# Patient Record
Sex: MALE | Race: WHITE | NOT HISPANIC OR LATINO | URBAN - METROPOLITAN AREA
[De-identification: names, ages, dates, MRNs, and addresses within clinical notes are randomized per-mention and may not be internally consistent; named-entity substitution may affect disease eponyms.]

---

## 2018-01-19 ENCOUNTER — INPATIENT (INPATIENT)
Facility: HOSPITAL | Age: 53
LOS: 3 days | Discharge: PSYCHIATRIC OR SUBSTANCE USE FACILITY | DRG: 557 | End: 2018-01-23
Attending: INTERNAL MEDICINE | Admitting: INTERNAL MEDICINE
Payer: MEDICAID

## 2018-01-19 VITALS
RESPIRATION RATE: 16 BRPM | HEART RATE: 104 BPM | TEMPERATURE: 98 F | DIASTOLIC BLOOD PRESSURE: 71 MMHG | OXYGEN SATURATION: 98 % | SYSTOLIC BLOOD PRESSURE: 114 MMHG

## 2018-01-19 DIAGNOSIS — N17.9 ACUTE KIDNEY FAILURE, UNSPECIFIED: ICD-10-CM

## 2018-01-19 DIAGNOSIS — R63.8 OTHER SYMPTOMS AND SIGNS CONCERNING FOOD AND FLUID INTAKE: ICD-10-CM

## 2018-01-19 DIAGNOSIS — G92 TOXIC ENCEPHALOPATHY: ICD-10-CM

## 2018-01-19 DIAGNOSIS — F25.0 SCHIZOAFFECTIVE DISORDER, BIPOLAR TYPE: ICD-10-CM

## 2018-01-19 DIAGNOSIS — Z29.9 ENCOUNTER FOR PROPHYLACTIC MEASURES, UNSPECIFIED: ICD-10-CM

## 2018-01-19 DIAGNOSIS — M62.82 RHABDOMYOLYSIS: ICD-10-CM

## 2018-01-19 DIAGNOSIS — R74.8 ABNORMAL LEVELS OF OTHER SERUM ENZYMES: ICD-10-CM

## 2018-01-19 LAB
ALBUMIN SERPL ELPH-MCNC: 4.7 G/DL — SIGNIFICANT CHANGE UP (ref 3.3–5)
ALP SERPL-CCNC: 114 U/L — SIGNIFICANT CHANGE UP (ref 40–120)
ALT FLD-CCNC: 91 U/L — HIGH (ref 10–45)
AMPHET UR-MCNC: NEGATIVE — SIGNIFICANT CHANGE UP
ANION GAP SERPL CALC-SCNC: 27 MMOL/L — HIGH (ref 5–17)
APAP SERPL-MCNC: <15 UG/ML — SIGNIFICANT CHANGE UP (ref 10–30)
APPEARANCE UR: CLEAR — SIGNIFICANT CHANGE UP
APTT BLD: 27.9 SEC — SIGNIFICANT CHANGE UP (ref 27.5–37.4)
AST SERPL-CCNC: 309 U/L — HIGH (ref 10–40)
BACTERIA # UR AUTO: (no result) /HPF
BARBITURATES UR SCN-MCNC: NEGATIVE — SIGNIFICANT CHANGE UP
BASE EXCESS BLDV CALC-SCNC: -9.1 MMOL/L — SIGNIFICANT CHANGE UP
BASOPHILS NFR BLD AUTO: 0 % — SIGNIFICANT CHANGE UP (ref 0–2)
BENZODIAZ UR-MCNC: NEGATIVE — SIGNIFICANT CHANGE UP
BILIRUB SERPL-MCNC: 1.2 MG/DL — SIGNIFICANT CHANGE UP (ref 0.2–1.2)
BILIRUB UR-MCNC: (no result)
BUN SERPL-MCNC: 72 MG/DL — HIGH (ref 7–23)
CALCIUM SERPL-MCNC: 9.6 MG/DL — SIGNIFICANT CHANGE UP (ref 8.4–10.5)
CHLORIDE SERPL-SCNC: 104 MMOL/L — SIGNIFICANT CHANGE UP (ref 96–108)
CO2 SERPL-SCNC: 16 MMOL/L — LOW (ref 22–31)
COCAINE METAB.OTHER UR-MCNC: NEGATIVE — SIGNIFICANT CHANGE UP
COLOR SPEC: YELLOW — SIGNIFICANT CHANGE UP
CREAT SERPL-MCNC: 3.18 MG/DL — HIGH (ref 0.5–1.3)
DIFF PNL FLD: (no result)
EOSINOPHIL NFR BLD AUTO: 0 % — SIGNIFICANT CHANGE UP (ref 0–6)
EPI CELLS # UR: SIGNIFICANT CHANGE UP /HPF (ref 0–5)
ETHANOL SERPL-MCNC: <10 MG/DL — SIGNIFICANT CHANGE UP (ref 0–10)
GAS PNL BLDV: SIGNIFICANT CHANGE UP
GLUCOSE SERPL-MCNC: 115 MG/DL — HIGH (ref 70–99)
GLUCOSE UR QL: NEGATIVE — SIGNIFICANT CHANGE UP
GRAN CASTS # UR COMP ASSIST: (no result) /LPF
HCO3 BLDV-SCNC: 15 MMOL/L — LOW (ref 20–27)
HCT VFR BLD CALC: 41 % — SIGNIFICANT CHANGE UP (ref 39–50)
HGB BLD-MCNC: 14.1 G/DL — SIGNIFICANT CHANGE UP (ref 13–17)
HYALINE CASTS # UR AUTO: (no result) /LPF (ref 0–2)
HYPERCHROMIA BLD QL AUTO: SLIGHT — SIGNIFICANT CHANGE UP
INR BLD: 1.26 — HIGH (ref 0.88–1.16)
KETONES UR-MCNC: (no result) MG/DL
LACTATE SERPL-SCNC: 1.9 MMOL/L — SIGNIFICANT CHANGE UP (ref 0.5–2)
LACTATE SERPL-SCNC: 4.5 MMOL/L — CRITICAL HIGH (ref 0.5–2)
LEUKOCYTE ESTERASE UR-ACNC: NEGATIVE — SIGNIFICANT CHANGE UP
LITHIUM SERPL-MCNC: 0.86 MMOL/L — SIGNIFICANT CHANGE UP (ref 0.6–1.2)
LYMPHOCYTES # BLD AUTO: 2 % — LOW (ref 13–44)
MAGNESIUM SERPL-MCNC: 3.5 MG/DL — HIGH (ref 1.6–2.6)
MANUAL DIF COMMENT BLD-IMP: SIGNIFICANT CHANGE UP
MANUAL SMEAR VERIFICATION: SIGNIFICANT CHANGE UP
MCHC RBC-ENTMCNC: 30.3 PG — SIGNIFICANT CHANGE UP (ref 27–34)
MCHC RBC-ENTMCNC: 34.4 G/DL — SIGNIFICANT CHANGE UP (ref 32–36)
MCV RBC AUTO: 88 FL — SIGNIFICANT CHANGE UP (ref 80–100)
METHADONE UR-MCNC: NEGATIVE — SIGNIFICANT CHANGE UP
MONOCYTES NFR BLD AUTO: 2 % — SIGNIFICANT CHANGE UP (ref 2–14)
NEUTROPHILS NFR BLD AUTO: 94 % — HIGH (ref 43–77)
NEUTS BAND # BLD: 2 % — SIGNIFICANT CHANGE UP
NITRITE UR-MCNC: NEGATIVE — SIGNIFICANT CHANGE UP
OPIATES UR-MCNC: NEGATIVE — SIGNIFICANT CHANGE UP
OVALOCYTES BLD QL SMEAR: SLIGHT — SIGNIFICANT CHANGE UP
PCO2 BLDV: 28 MMHG — LOW (ref 41–51)
PCP SPEC-MCNC: SIGNIFICANT CHANGE UP
PCP UR-MCNC: NEGATIVE — SIGNIFICANT CHANGE UP
PH BLDV: 7.34 — SIGNIFICANT CHANGE UP (ref 7.32–7.43)
PH UR: 6 — SIGNIFICANT CHANGE UP (ref 5–8)
PLAT MORPH BLD: NORMAL — SIGNIFICANT CHANGE UP
PLATELET # BLD AUTO: 326 K/UL — SIGNIFICANT CHANGE UP (ref 150–400)
PO2 BLDV: 100 MMHG — SIGNIFICANT CHANGE UP
POTASSIUM SERPL-MCNC: 3.8 MMOL/L — SIGNIFICANT CHANGE UP (ref 3.5–5.3)
POTASSIUM SERPL-SCNC: 3.8 MMOL/L — SIGNIFICANT CHANGE UP (ref 3.5–5.3)
PROT SERPL-MCNC: 8.5 G/DL — HIGH (ref 6–8.3)
PROT UR-MCNC: 100 MG/DL
PROTHROM AB SERPL-ACNC: 14.1 SEC — HIGH (ref 9.8–12.7)
RAPID RVP RESULT: SIGNIFICANT CHANGE UP
RBC # BLD: 4.66 M/UL — SIGNIFICANT CHANGE UP (ref 4.2–5.8)
RBC # FLD: 14.7 % — SIGNIFICANT CHANGE UP (ref 10.3–16.9)
RBC BLD AUTO: (no result)
RBC CASTS # UR COMP ASSIST: < 5 /HPF — SIGNIFICANT CHANGE UP
SALICYLATES SERPL-MCNC: <0.3 MG/DL — LOW (ref 2.8–20)
SAO2 % BLDV: 97 % — SIGNIFICANT CHANGE UP
SODIUM SERPL-SCNC: 147 MMOL/L — HIGH (ref 135–145)
SP GR SPEC: 1.02 — SIGNIFICANT CHANGE UP (ref 1–1.03)
SPHEROCYTES BLD QL SMEAR: SLIGHT — SIGNIFICANT CHANGE UP
THC UR QL: NEGATIVE — SIGNIFICANT CHANGE UP
TROPONIN T SERPL-MCNC: 0.02 NG/ML — HIGH (ref 0–0.01)
UROBILINOGEN FLD QL: 0.2 E.U./DL — SIGNIFICANT CHANGE UP
WBC # BLD: 34.6 K/UL — HIGH (ref 3.8–10.5)
WBC # FLD AUTO: 34.6 K/UL — HIGH (ref 3.8–10.5)
WBC UR QL: < 5 /HPF — SIGNIFICANT CHANGE UP

## 2018-01-19 PROCEDURE — 99291 CRITICAL CARE FIRST HOUR: CPT | Mod: 25

## 2018-01-19 PROCEDURE — 71045 X-RAY EXAM CHEST 1 VIEW: CPT | Mod: 26

## 2018-01-19 PROCEDURE — 93010 ELECTROCARDIOGRAM REPORT: CPT

## 2018-01-19 PROCEDURE — 90792 PSYCH DIAG EVAL W/MED SRVCS: CPT

## 2018-01-19 PROCEDURE — 70450 CT HEAD/BRAIN W/O DYE: CPT | Mod: 26

## 2018-01-19 RX ORDER — FLUPHENAZINE HYDROCHLORIDE 1 MG/1
5 TABLET, FILM COATED ORAL EVERY 6 HOURS
Qty: 0 | Refills: 0 | Status: DISCONTINUED | OUTPATIENT
Start: 2018-01-19 | End: 2018-01-20

## 2018-01-19 RX ORDER — SODIUM CHLORIDE 9 MG/ML
1000 INJECTION INTRAMUSCULAR; INTRAVENOUS; SUBCUTANEOUS
Qty: 0 | Refills: 0 | Status: DISCONTINUED | OUTPATIENT
Start: 2018-01-19 | End: 2018-01-20

## 2018-01-19 RX ORDER — SODIUM CHLORIDE 9 MG/ML
1000 INJECTION INTRAMUSCULAR; INTRAVENOUS; SUBCUTANEOUS ONCE
Qty: 0 | Refills: 0 | Status: COMPLETED | OUTPATIENT
Start: 2018-01-19 | End: 2018-01-19

## 2018-01-19 RX ORDER — PIPERACILLIN AND TAZOBACTAM 4; .5 G/20ML; G/20ML
3.38 INJECTION, POWDER, LYOPHILIZED, FOR SOLUTION INTRAVENOUS ONCE
Qty: 0 | Refills: 0 | Status: DISCONTINUED | OUTPATIENT
Start: 2018-01-19 | End: 2018-01-19

## 2018-01-19 RX ORDER — HEPARIN SODIUM 5000 [USP'U]/ML
5000 INJECTION INTRAVENOUS; SUBCUTANEOUS EVERY 8 HOURS
Qty: 0 | Refills: 0 | Status: DISCONTINUED | OUTPATIENT
Start: 2018-01-19 | End: 2018-01-23

## 2018-01-19 RX ORDER — PIPERACILLIN AND TAZOBACTAM 4; .5 G/20ML; G/20ML
2.25 INJECTION, POWDER, LYOPHILIZED, FOR SOLUTION INTRAVENOUS ONCE
Qty: 0 | Refills: 0 | Status: COMPLETED | OUTPATIENT
Start: 2018-01-19 | End: 2018-01-19

## 2018-01-19 RX ORDER — PIPERACILLIN AND TAZOBACTAM 4; .5 G/20ML; G/20ML
INJECTION, POWDER, LYOPHILIZED, FOR SOLUTION INTRAVENOUS
Qty: 0 | Refills: 0 | Status: DISCONTINUED | OUTPATIENT
Start: 2018-01-19 | End: 2018-01-19

## 2018-01-19 RX ADMIN — HEPARIN SODIUM 5000 UNIT(S): 5000 INJECTION INTRAVENOUS; SUBCUTANEOUS at 21:57

## 2018-01-19 RX ADMIN — SODIUM CHLORIDE 200 MILLILITER(S): 9 INJECTION INTRAMUSCULAR; INTRAVENOUS; SUBCUTANEOUS at 21:57

## 2018-01-19 RX ADMIN — Medication 1 MILLIGRAM(S): at 14:57

## 2018-01-19 RX ADMIN — SODIUM CHLORIDE 1000 MILLILITER(S): 9 INJECTION INTRAMUSCULAR; INTRAVENOUS; SUBCUTANEOUS at 14:18

## 2018-01-19 RX ADMIN — SODIUM CHLORIDE 1000 MILLILITER(S): 9 INJECTION INTRAMUSCULAR; INTRAVENOUS; SUBCUTANEOUS at 17:15

## 2018-01-19 RX ADMIN — PIPERACILLIN AND TAZOBACTAM 200 GRAM(S): 4; .5 INJECTION, POWDER, LYOPHILIZED, FOR SOLUTION INTRAVENOUS at 15:52

## 2018-01-19 NOTE — ED BEHAVIORAL HEALTH ASSESSMENT NOTE - RISK ASSESSMENT
Pt with no evidence of suicidal or homicidal ideation, intent or plan.  However, due to AMS and paranoia, is at risk for elopement. Will order C.O. for safety.

## 2018-01-19 NOTE — ED PROVIDER NOTE - CARE PLAN
Principal Discharge DX:	Altered mental status, unspecified altered mental status type  Secondary Diagnosis:	Acute renal failure, unspecified acute renal failure type  Secondary Diagnosis:	Non-traumatic rhabdomyolysis

## 2018-01-19 NOTE — ED PROVIDER NOTE - PROGRESS NOTE DETAILS
Spoke w/ ICU- consult requested. Pt placed on 1:1 obs for elopement risk. Pt getting agitated, stating he is scared that someone is going to kill him. Ativan given and pt more calm. Will obtain head ct now. Psych made aware. Pt seen by icu and psych. Will admit to [facility] for further management.

## 2018-01-19 NOTE — ED BEHAVIORAL HEALTH ASSESSMENT NOTE - OTHER
Outpt psychiatrist EMS Death of father In bed "Nausous" Confused Impoverished UTO Recent death of father

## 2018-01-19 NOTE — H&P ADULT - PROBLEM SELECTOR PLAN 2
Likely pre-renal given dehydrated state of patient on admission vs intrinsic 2/2 rhabdo.   - Continue IV hydration.  - Trend creatinine.   - Urine lytes ordered, f/u.

## 2018-01-19 NOTE — ED PROVIDER NOTE - DIAGNOSTIC INTERPRETATION
ER Physician: June Ree  CHEST XRAY INTERPRETATION: poor insp effort, lungs otherwise clear, heart shadow normal, bony structures intact

## 2018-01-19 NOTE — ED PROVIDER NOTE - PHYSICAL EXAMINATION
VITAL SIGNS: I have reviewed nursing notes and confirm.  CONSTITUTIONAL: Well-developed; well-nourished; in no acute distress.   SKIN:  warm and dry, no acute rash.   HEAD:  normocephalic, atraumatic.  EYES: PERRL, EOM intact; conjunctiva and sclera clear.  ENT: No nasal discharge; airway clear.   NECK: Supple; non tender.  CARD: S1, S2 normal; no murmurs, gallops, or rubs. Mildly tachy rate and reg rhythm.   RESP:  Clear to auscultation b/l, no wheezes, rales or rhonchi.  ABD: Normal bowel sounds; soft; non-distended; non-tender; no guarding/ rebound.  EXT: Normal ROM. No clubbing, cyanosis or edema. 2+ pulses to b/l ue/le.  NEURO: Alert, oriented to name, able to follow few simple commands, no obvious facial droop, motor/ sensation intact and equal b/l.   PSYCH: Unable to assess.

## 2018-01-19 NOTE — H&P ADULT - NSHPLABSRESULTS_GEN_ALL_CORE
LABS:                         14.1   34.6  )-----------( 326      ( 2018 14:01 )             41.0         147<H>  |  104  |  72<H>  ----------------------------<  115<H>  3.8   |  16<L>  |  3.18<H>    Ca    9.6      2018 14:01  Mg     3.5         TPro  8.5<H>  /  Alb  4.7  /  TBili  1.2  /  DBili  x   /  AST  309<H>  /  ALT  91<H>  /  AlkPhos  114      PT/INR - ( 2018 14:01 )   PT: 14.1 sec;   INR: 1.26       PTT - ( 2018 14:01 )  PTT:27.9 sec  Urinalysis Basic - ( 2018 16:06 )    Color: Yellow / Appearance: Clear / S.020 / pH: x  Gluc: x / Ketone: Trace mg/dL  / Bili: Small / Urobili: 0.2 E.U./dL   Blood: x / Protein: 100 mg/dL / Nitrite: NEGATIVE   Leuk Esterase: NEGATIVE / RBC: < 5 /HPF / WBC < 5 /HPF   Sq Epi: x / Non Sq Epi: 0-5 /HPF / Bacteria: Many /HPF    CARDIAC MARKERS ( 2018 14:01 )  x     / 0.02 ng/mL / 27922 U/L / x     / x        Lactate, Blood: 1.9 mmoL/L ( @ 19:04)  Lactate, Blood: 4.5 mmoL/L ( @ 14:00)    RADIOLOGY, EKG & ADDITIONAL TESTS: Reviewed.

## 2018-01-19 NOTE — H&P ADULT - ASSESSMENT
Patient is a 53yo Danish speaking male with a PMHx of schizoaffective disorder (bipolar type), and past psychotic episodes with hospital admissions was BIBA after being found lying on the ground confused and incontinent of stool, admitted for toxic metabolic encephalopathy, FELISHA, and non traumatic rhabdomyolysis.

## 2018-01-19 NOTE — ED ADULT NURSE NOTE - OBJECTIVE STATEMENT
Patient presents to the ED with EMS, as per EMS patient was found on the street altered. Patient is Puerto Rican speaking. Awake, and alert. Upgraded to MD Christin.  Small laceration to right thumb. Patient noted to have defected on self. Patient cleaned and changed. As per MD Ree patient has a  psych history.

## 2018-01-19 NOTE — ED BEHAVIORAL HEALTH ASSESSMENT NOTE - DESCRIPTION
Pt observed in bed, s/p rx of lorazepam 1 mg x 1. Intermittently responsive to questions. None known Born in [place]. [place] in . Lives with mother. Father recently .

## 2018-01-19 NOTE — ED BEHAVIORAL HEALTH ASSESSMENT NOTE - HPI (INCLUDE ILLNESS QUALITY, SEVERITY, DURATION, TIMING, CONTEXT, MODIFYING FACTORS, ASSOCIATED SIGNS AND SYMPTOMS)
Briefly, pt is 52-year-old [place]-born male with long hx of schizoaffective disorder, bipolar type, at least 3 prior inpt psychiatric admits, reportedly compliant with medication regimen, found wandering on the street, brought in by EMS. Mother reportedly called police to report pt missing.  Pt lives at home, is reportedly very compliant with his rx regimen.   Pt's father  on . Mother reports pt "unravelled" after father's death. During memorial service on , pt was "manic and hysterical." Pt brought to [facility], was initially admitted to medicine with plan that he would then be admitted to inpt psychiatry. However, he was discharged.

## 2018-01-19 NOTE — ED BEHAVIORAL HEALTH ASSESSMENT NOTE - OTHER PAST PSYCHIATRIC HISTORY (INCLUDE DETAILS REGARDING ONSET, COURSE OF ILLNESS, INPATIENT/OUTPATIENT TREATMENT)
As above.  Pt with at least 3 prior inpt psychiatric admits:   2003: [place] pt stopped his medications, went AWOL, [place] and [place] to [place].  2005: Admitted to [facility] due to ave, psychosis, paranoia, auditory hallucinations. Thought he was being followed by [employer]  2015: [facility] x 10-12 days.    In outpt treatment at [facility] ([phone]); Dr. Quintero

## 2018-01-19 NOTE — H&P ADULT - PROBLEM SELECTOR PLAN 1
Unclear etiology. Differential includes but not limited to uremia vs dehydration. Patient with normal utox and alcohol level wnl as well. Unlikely infectious in etiology given no source and patient afebrile. Also low suspicious for meningitis since physical exam does not correlate. CT head not showing any acute lesions. Lactate now cleared.   - IV hydration.   - Trend BMP for resolution of uremia and closure of anion gap.   - If patient was to spike a temp, please consider aspiration pneumonia as a source given patient's mental status when found. Unclear etiology. Differential includes but not limited to uremia vs dehydration. Patient with normal utox and alcohol level wnl as well. Unlikely infectious in etiology given no source and patient afebrile. Leukocytosis likely 2/2 leukemoid reaction. Also low suspicious for meningitis since physical exam does not correlate. CT head not showing any acute lesions. Lactate now cleared.   - IV hydration.   - Trend BMP for resolution of uremia and closure of anion gap.   - If patient was to spike a temp, please consider aspiration pneumonia as a source given patient's mental status when found.

## 2018-01-19 NOTE — H&P ADULT - NSHPPHYSICALEXAM_GEN_ALL_CORE
VITAL SIGNS:  T(C): 36.8 (01-19-18 @ 20:59), Max: 37.2 (01-19-18 @ 14:18)  T(F): 98.2 (01-19-18 @ 20:59), Max: 99 (01-19-18 @ 14:18)  HR: 86 (01-19-18 @ 17:54) (86 - 104)  BP: 171/79 (01-19-18 @ 17:54) (114/71 - 171/79)  BP(mean): --  RR: 18 (01-19-18 @ 17:54) (16 - 18)  SpO2: 94% (01-19-18 @ 17:54) (93% - 98%)  Wt(kg): --    PHYSICAL EXAM:    Constitutional: WDWN resting comfortably in bed in NAD but slightly restless. Not fully cooperating with exam, following commands, or answering appropriately.   Head: NC/AT  Eyes: PERRL, EOMI, clear conjunctiva  ENT: mildly dry MM  Respiratory: CTA bilaterally  Cardiac: tachycardic, regular rhythm, no M/R/G appreciated  Gastrointestinal: normoactive bowel sounds, abdomen soft, NTND  Extremities: WWP, no clubbing or cyanosis. No peripheral edema  Musculoskeletal: NROM x4. No joint swelling, tenderness or erythema  Vascular: 2+ radial and DP pulses B/L  Neurologic: AAOx1 (self). CNII-XII grossly intact. No focal deficits  Psychiatric: affect and characteristics of appearance, verbalizations, behaviors are inappropriate. Patient seems like having visual hallucinations although not endorsing them.

## 2018-01-19 NOTE — ED BEHAVIORAL HEALTH ASSESSMENT NOTE - SUMMARY
52-year-old Citizen of [place]-born male with long hx of schizoaffective disorder, bipolar type, at least 3 prior inpt psychiatric admits, reportedly compliant with medication regimen, found wandering on the street, brought in by EMS. Mother reportedly called police to report pt missing.  Pt lives at home, is reportedly very compliant with his rx regimen.   Pt currently confused, paranoid. Currently had increased CPK and WBC, which may be contributing to AMS.  Likely trigger is recent death of father.    Pt requiring acute medical admission.    For now, will hold all psychotropic medications, give prn prolixin 5 mg and ativan 1 mg.   C.O. for safety.  Pt will be seen over the weekend by psychiatry M.D. on call.  If pt remains with psychosis once medically improved/stabilized, will likely need acute inpt psych admit.

## 2018-01-19 NOTE — CONSULT NOTE ADULT - ATTENDING COMMENTS
Patient seen and examined; Plans discussed: Very agitated; Requiring 1:1. CK noted and sodium noted; Change to hypotonic fluid and decrease rate:

## 2018-01-19 NOTE — H&P ADULT - PROBLEM SELECTOR PLAN 4
Currently takes Lithium, Venlafaxine, Klonopin, Effexor, Risperdal, Trazodone, and Fluphenazine. Holding home meds.  - Psych following while inpatient and recommended Prolixin and lorazepam prn.  - Constant observation.

## 2018-01-19 NOTE — H&P ADULT - HISTORY OF PRESENT ILLNESS
Patient is a 53yo Slovenian speaking male with a PMHx of schizoaffective disorder (bipolar type), and past psychotic episodes with hospital admissions was BIBA after being found lying on the ground confused and incontinent of stool. Patient is unable to provide info to medical history given state of lethargy and confusion, as a result history was obtained via chart review and exchange with ED provider and psychiatric consultant. It was reported that patient left home yesterday and was placed on missing person list by [facility]. Patient has been under increased stress lately as his father passed away about a month ago. Patient lives with mother and reportedly has been compliant with meds. Assisted by Citizen of [place] speaking pharmacist, patient complained of cough for the past 2 weeks, but then said it was for the past 5mins. Otherwise he stated having no further complains.     In the ED, vital signs were:  - BP: 114/71mmHg  - HR: 104  - Temp: 98  - RR: 16   - O2Sat: 98% on RA    Patient was administered Ativan 2mg x 1 due to agitation. ICU consulted in setting of toxic metabolic encephalopathy, as well as psych given patient's known psych history.

## 2018-01-19 NOTE — ED ADULT TRIAGE NOTE - OTHER COMPLAINTS
lee found on street unresponsive by fire and now altered, pt unsure why he fell and was on floor, pt defecated himself

## 2018-01-19 NOTE — ED PROVIDER NOTE - MEDICAL DECISION MAKING DETAILS
Impression: ams, with h/o schizoaffective d/o, psychosis, reported as missing by mother as of yesterday. No signs of head trauma. Pt is a&o to name, knows that he is in hospital, cannot recall recent events. Afebrile. Mildly tachy and slightly hypoxic to low 90's on arrival, improved with supplemental o2. Labs reviewed w/ findings of leukocytosis to 34.6, lactic acidosis, hypernatremia, arf. pH 7.34 on vbg. UA neg for infection. Utox neg. Tylenol/ asa/ etoh levels wnl. Li level therapeutic. CPK elevated to 15k. Minimal trop at 0.02. EKG non-ischemic. Pt ordered for ivf boluses and zosyn for possible sepsis. ICU consult requested for evaluation.

## 2018-01-19 NOTE — CONSULT NOTE ADULT - SUBJECTIVE AND OBJECTIVE BOX
ICU CONSULT NOTE    HPI: 52y old Male Belarusian speaking with psychiatric history significant for schizoaffective disorder--Bipolar type, past psychotic episodes with hospital admissions was BIBA after being found lying on the ground on the corner of [address], confused and incontinent of stool. Patient is unable to provide info to medical history given state of lethargy and confusion, as a result history was obtained via chart review and exchange with ED provider and psychiatric consultant. It was reported that patient left home yesterday and was placed on missing person list by [facility]. Patient has been under increased stress lately as his father passed away about a month ago. Patient lives with mother and reportedly has been compliant with meds. Currently takes Lithium, Venlafaxine, Klonopin, Effexor, Risperdal, Trazodone, and Fluphenazine.  With the help of Belarusian , patient did complain of being thirsty and headache. Denies any blurry vision. No visual or auditory hallucinations.     In ED, vitals were remarkable for elevated BP. Patient was administered Ativan 2mg x 1 due to agitation ICU consulted in setting of toxic metabolic encephalopathy.      ROS: As per HPI     PAST MEDICAL & SURGICAL HISTORY  schizoaffective disorder--Bipolar type    Allergies    No Known Allergies    Intolerances    SOCIAL HX: unable to obtain given current medical condition    PHYSICAL EXAM   Vital Signs Last 24 Hrs  T(C): 36.8 (2018 17:54), Max: 37.2 (2018 14:18)  T(F): 98.3 (2018 17:54), Max: 99 (2018 14:18)  HR: 86 (2018 17:54) (86 - 104)  BP: 171/79 (2018 17:54) (114/71 - 171/79)  BP(mean): --  RR: 18 (2018 17:54) (16 - 18)  SpO2: 94% (2018 17:54) (93% - 98%)      General - Lethargic, lying in bed in NAD   HEENT - PERRLA  Mouth: Very dry mucous membrane   CV - Mild tachycardia, Normal S1, S2  Resp - Lungs CTA  Abdomen - Soft, NT, ND  Neuro" AAOx1, moving all extremities. Lethargic but easily arousable   Extremities - WWP  Skin - No rash       LABS                        14.1   34.6  )-----------( 326      ( 2018 14:01 )             41.0         147<H>  |  104  |  72<H>  ----------------------------<  115<H>  3.8   |  16<L>  |  3.18<H>    Ca    9.6      2018 14:01  Mg     3.5         TPro  8.5<H>  /  Alb  4.7  /  TBili  1.2  /  DBili  x   /  AST  309<H>  /  ALT  91<H>  /  AlkPhos  114      PT/INR - ( 2018 14:01 )   PT: 14.1 sec;   INR: 1.26       Creatine Kinase, Serum (18 @ 14:01)    Creatine Kinase, Serum: 40974: TYPE:(C=Critical, N=Notification, A=Abnormal) C       PTT - ( 2018 14:01 )  PTT:27.9 sec  Lactate, Blood: 4.5 mmoL/L (18 @ 14:00)    Urinalysis Basic - ( 2018 16:06 )    Color: Yellow / Appearance: Clear / S.020 / pH: x  Gluc: x / Ketone: Trace mg/dL  / Bili: Small / Urobili: 0.2 E.U./dL   Blood: x / Protein: 100 mg/dL / Nitrite: NEGATIVE   Leuk Esterase: NEGATIVE / RBC: < 5 /HPF / WBC < 5 /HPF   Sq Epi: x / Non Sq Epi: 0-5 /HPF / Bacteria: Many /HPF    Drug Screen: Negative     IMAGING     CT HEAD:    IMPRESSION:-    1.  No intracranial hemorrhage or infarct..    2.  An arachnoid cyst in the left anterior temporal fossa.       CXR - 18  Impression: No obvious infiltrates but cannot r/o retrocardiac infiltrates      EKG - 18 Sinus Tachycardia. Signs of LVH, no ST elevation

## 2018-01-19 NOTE — ED PROVIDER NOTE - OBJECTIVE STATEMENT
Pt is a 51yo m, h/o schizoaffective d/o, bipolar type, psychotic episodes and hypermania with last episode being > 10 yrs ago, ybiba for ams. Pt was found lying on ground on corner of [address], confused and incontinent of stool. + abrasion noted to r index finger. Pt is Zimbabwean speaking. As per pt's psych, Dr. Quintero, pt got agitated and left home yesterday afternoon, [employer] contacted and pt placed on missing persons list. H/o similar episode many yrs ago when has psychotic episode. Father recently passed and pt has been under a lot of stress. Pt is currently on klonazepam 1mg hs, effexor 225mg qd, lithium 600mg bid, resperdal 1mg qhs, trazodone 100mg qhs. No h/o etoh abuse, substance abuse. No si, hi. Pt is a 51yo m, h/o schizoaffective d/o, bipolar type, psychotic episodes and hypermania with last episode being > 10 yrs ago, ybiba for ams. Pt was found lying on ground on corner of [address], confused and incontinent of stool. + abrasion noted to r index finger. + incontinence of stool. Pt is Azerbaijani speaking. As per pt's psych, Dr. Quintero, pt got agitated and left home yesterday afternoon, [employer] contacted and pt placed on missing persons list. H/o similar episode many yrs ago when has psychotic episode. Father recently passed and pt has been under a lot of stress. Pt is currently on klonazepam 1mg hs, effexor 225mg qd, lithium 600mg bid, resperdal 1mg qhs, trazodone 100mg qhs. No h/o etoh abuse, substance abuse. No si, hi.

## 2018-01-20 DIAGNOSIS — R41.0 DISORIENTATION, UNSPECIFIED: ICD-10-CM

## 2018-01-20 LAB
ALBUMIN SERPL ELPH-MCNC: 3.7 G/DL — SIGNIFICANT CHANGE UP (ref 3.3–5)
ALBUMIN SERPL ELPH-MCNC: 4 G/DL — SIGNIFICANT CHANGE UP (ref 3.3–5)
ALP SERPL-CCNC: 88 U/L — SIGNIFICANT CHANGE UP (ref 40–120)
ALP SERPL-CCNC: 98 U/L — SIGNIFICANT CHANGE UP (ref 40–120)
ALT FLD-CCNC: 150 U/L — HIGH (ref 10–45)
ALT FLD-CCNC: 206 U/L — HIGH (ref 10–45)
ANION GAP SERPL CALC-SCNC: 14 MMOL/L — SIGNIFICANT CHANGE UP (ref 5–17)
ANION GAP SERPL CALC-SCNC: 14 MMOL/L — SIGNIFICANT CHANGE UP (ref 5–17)
ANION GAP SERPL CALC-SCNC: 21 MMOL/L — HIGH (ref 5–17)
AST SERPL-CCNC: 526 U/L — HIGH (ref 10–40)
AST SERPL-CCNC: 569 U/L — HIGH (ref 10–40)
BILIRUB DIRECT SERPL-MCNC: <0.2 MG/DL — SIGNIFICANT CHANGE UP (ref 0–0.2)
BILIRUB DIRECT SERPL-MCNC: <0.2 MG/DL — SIGNIFICANT CHANGE UP (ref 0–0.2)
BILIRUB INDIRECT FLD-MCNC: >0.3 MG/DL — SIGNIFICANT CHANGE UP (ref 0.2–1)
BILIRUB INDIRECT FLD-MCNC: >0.3 MG/DL — SIGNIFICANT CHANGE UP (ref 0.2–1)
BILIRUB SERPL-MCNC: 0.5 MG/DL — SIGNIFICANT CHANGE UP (ref 0.2–1.2)
BILIRUB SERPL-MCNC: 0.5 MG/DL — SIGNIFICANT CHANGE UP (ref 0.2–1.2)
BILIRUB SERPL-MCNC: 0.8 MG/DL — SIGNIFICANT CHANGE UP (ref 0.2–1.2)
BUN SERPL-MCNC: 25 MG/DL — HIGH (ref 7–23)
BUN SERPL-MCNC: 29 MG/DL — HIGH (ref 7–23)
BUN SERPL-MCNC: 46 MG/DL — HIGH (ref 7–23)
CALCIUM SERPL-MCNC: 9.1 MG/DL — SIGNIFICANT CHANGE UP (ref 8.4–10.5)
CALCIUM SERPL-MCNC: 9.3 MG/DL — SIGNIFICANT CHANGE UP (ref 8.4–10.5)
CALCIUM SERPL-MCNC: 9.6 MG/DL — SIGNIFICANT CHANGE UP (ref 8.4–10.5)
CHLORIDE SERPL-SCNC: 116 MMOL/L — HIGH (ref 96–108)
CHLORIDE SERPL-SCNC: 118 MMOL/L — HIGH (ref 96–108)
CHLORIDE SERPL-SCNC: 123 MMOL/L — HIGH (ref 96–108)
CK MB CFR SERPL CALC: 288.3 NG/ML — HIGH (ref 0–6.7)
CK MB CFR SERPL CALC: 310.7 NG/ML — HIGH (ref 0–6.7)
CK SERPL-CCNC: CRITICAL HIGH U/L (ref 30–200)
CK SERPL-CCNC: CRITICAL HIGH U/L (ref 30–200)
CO2 SERPL-SCNC: 16 MMOL/L — LOW (ref 22–31)
CO2 SERPL-SCNC: 19 MMOL/L — LOW (ref 22–31)
CO2 SERPL-SCNC: 22 MMOL/L — SIGNIFICANT CHANGE UP (ref 22–31)
CREAT ?TM UR-MCNC: 28 MG/DL — SIGNIFICANT CHANGE UP
CREAT SERPL-MCNC: 1.06 MG/DL — SIGNIFICANT CHANGE UP (ref 0.5–1.3)
CREAT SERPL-MCNC: 1.09 MG/DL — SIGNIFICANT CHANGE UP (ref 0.5–1.3)
CREAT SERPL-MCNC: 1.35 MG/DL — HIGH (ref 0.5–1.3)
CULTURE RESULTS: NO GROWTH — SIGNIFICANT CHANGE UP
FERRITIN SERPL-MCNC: 176.6 NG/ML — SIGNIFICANT CHANGE UP (ref 30–400)
GLUCOSE SERPL-MCNC: 128 MG/DL — HIGH (ref 70–99)
GLUCOSE SERPL-MCNC: 156 MG/DL — HIGH (ref 70–99)
GLUCOSE SERPL-MCNC: 84 MG/DL — SIGNIFICANT CHANGE UP (ref 70–99)
HCT VFR BLD CALC: 37.7 % — LOW (ref 39–50)
HGB BLD-MCNC: 12.3 G/DL — LOW (ref 13–17)
IRON SATN MFR SERPL: 22 % — SIGNIFICANT CHANGE UP (ref 16–55)
IRON SATN MFR SERPL: 56 UG/DL — SIGNIFICANT CHANGE UP (ref 45–165)
LDH SERPL L TO P-CCNC: 832 U/L — HIGH (ref 50–242)
MAGNESIUM SERPL-MCNC: 3.1 MG/DL — HIGH (ref 1.6–2.6)
MCHC RBC-ENTMCNC: 29.5 PG — SIGNIFICANT CHANGE UP (ref 27–34)
MCHC RBC-ENTMCNC: 32.6 G/DL — SIGNIFICANT CHANGE UP (ref 32–36)
MCV RBC AUTO: 90.4 FL — SIGNIFICANT CHANGE UP (ref 80–100)
OSMOLALITY UR: 333 MOSMOL/KG — SIGNIFICANT CHANGE UP (ref 100–650)
PHOSPHATE SERPL-MCNC: 4.1 MG/DL — SIGNIFICANT CHANGE UP (ref 2.5–4.5)
PLATELET # BLD AUTO: 273 K/UL — SIGNIFICANT CHANGE UP (ref 150–400)
POTASSIUM SERPL-MCNC: 3.6 MMOL/L — SIGNIFICANT CHANGE UP (ref 3.5–5.3)
POTASSIUM SERPL-MCNC: 4.1 MMOL/L — SIGNIFICANT CHANGE UP (ref 3.5–5.3)
POTASSIUM SERPL-MCNC: 4.3 MMOL/L — SIGNIFICANT CHANGE UP (ref 3.5–5.3)
POTASSIUM SERPL-SCNC: 3.6 MMOL/L — SIGNIFICANT CHANGE UP (ref 3.5–5.3)
POTASSIUM SERPL-SCNC: 4.1 MMOL/L — SIGNIFICANT CHANGE UP (ref 3.5–5.3)
POTASSIUM SERPL-SCNC: 4.3 MMOL/L — SIGNIFICANT CHANGE UP (ref 3.5–5.3)
PROT SERPL-MCNC: 6.8 G/DL — SIGNIFICANT CHANGE UP (ref 6–8.3)
PROT SERPL-MCNC: 7.2 G/DL — SIGNIFICANT CHANGE UP (ref 6–8.3)
RBC # BLD: 4.17 M/UL — LOW (ref 4.2–5.8)
RBC # FLD: 14.8 % — SIGNIFICANT CHANGE UP (ref 10.3–16.9)
SODIUM SERPL-SCNC: 152 MMOL/L — HIGH (ref 135–145)
SODIUM SERPL-SCNC: 155 MMOL/L — HIGH (ref 135–145)
SODIUM SERPL-SCNC: 156 MMOL/L — HIGH (ref 135–145)
SODIUM UR-SCNC: 48 MMOL/L — SIGNIFICANT CHANGE UP
SPECIMEN SOURCE: SIGNIFICANT CHANGE UP
TIBC SERPL-MCNC: 256 UG/DL — SIGNIFICANT CHANGE UP (ref 220–430)
TRANSFERRIN SERPL-MCNC: 216 MG/DL — SIGNIFICANT CHANGE UP (ref 200–360)
TROPONIN T SERPL-MCNC: <0.01 NG/ML — SIGNIFICANT CHANGE UP (ref 0–0.01)
UIBC SERPL-MCNC: 200 UG/DL — SIGNIFICANT CHANGE UP (ref 110–370)
WBC # BLD: 23.4 K/UL — HIGH (ref 3.8–10.5)
WBC # FLD AUTO: 23.4 K/UL — HIGH (ref 3.8–10.5)

## 2018-01-20 PROCEDURE — 99231 SBSQ HOSP IP/OBS SF/LOW 25: CPT

## 2018-01-20 PROCEDURE — 99233 SBSQ HOSP IP/OBS HIGH 50: CPT | Mod: GC

## 2018-01-20 RX ORDER — SODIUM CHLORIDE 9 MG/ML
1000 INJECTION, SOLUTION INTRAVENOUS
Qty: 0 | Refills: 0 | Status: DISCONTINUED | OUTPATIENT
Start: 2018-01-20 | End: 2018-01-22

## 2018-01-20 RX ORDER — FLUPHENAZINE HYDROCHLORIDE 1 MG/1
5 TABLET, FILM COATED ORAL EVERY 12 HOURS
Qty: 0 | Refills: 0 | Status: DISCONTINUED | OUTPATIENT
Start: 2018-01-20 | End: 2018-01-23

## 2018-01-20 RX ORDER — FLUPHENAZINE HYDROCHLORIDE 1 MG/1
5 TABLET, FILM COATED ORAL
Qty: 0 | Refills: 0 | Status: DISCONTINUED | OUTPATIENT
Start: 2018-01-20 | End: 2018-01-23

## 2018-01-20 RX ORDER — SODIUM CHLORIDE 9 MG/ML
1000 INJECTION, SOLUTION INTRAVENOUS
Qty: 0 | Refills: 0 | Status: DISCONTINUED | OUTPATIENT
Start: 2018-01-20 | End: 2018-01-20

## 2018-01-20 RX ORDER — SODIUM CHLORIDE 9 MG/ML
1000 INJECTION INTRAMUSCULAR; INTRAVENOUS; SUBCUTANEOUS
Qty: 0 | Refills: 0 | Status: DISCONTINUED | OUTPATIENT
Start: 2018-01-20 | End: 2018-01-20

## 2018-01-20 RX ORDER — POTASSIUM CHLORIDE 20 MEQ
40 PACKET (EA) ORAL ONCE
Qty: 0 | Refills: 0 | Status: COMPLETED | OUTPATIENT
Start: 2018-01-20 | End: 2018-01-20

## 2018-01-20 RX ADMIN — SODIUM CHLORIDE 200 MILLILITER(S): 9 INJECTION INTRAMUSCULAR; INTRAVENOUS; SUBCUTANEOUS at 10:07

## 2018-01-20 RX ADMIN — SODIUM CHLORIDE 100 MILLILITER(S): 9 INJECTION, SOLUTION INTRAVENOUS at 12:31

## 2018-01-20 RX ADMIN — HEPARIN SODIUM 5000 UNIT(S): 5000 INJECTION INTRAVENOUS; SUBCUTANEOUS at 14:36

## 2018-01-20 RX ADMIN — Medication 1 MILLIGRAM(S): at 12:31

## 2018-01-20 RX ADMIN — HEPARIN SODIUM 5000 UNIT(S): 5000 INJECTION INTRAVENOUS; SUBCUTANEOUS at 06:19

## 2018-01-20 RX ADMIN — FLUPHENAZINE HYDROCHLORIDE 5 MILLIGRAM(S): 1 TABLET, FILM COATED ORAL at 11:43

## 2018-01-20 RX ADMIN — SODIUM CHLORIDE 100 MILLILITER(S): 9 INJECTION, SOLUTION INTRAVENOUS at 20:36

## 2018-01-20 RX ADMIN — Medication 40 MILLIEQUIVALENT(S): at 11:05

## 2018-01-20 RX ADMIN — FLUPHENAZINE HYDROCHLORIDE 5 MILLIGRAM(S): 1 TABLET, FILM COATED ORAL at 22:11

## 2018-01-20 RX ADMIN — HEPARIN SODIUM 5000 UNIT(S): 5000 INJECTION INTRAVENOUS; SUBCUTANEOUS at 22:11

## 2018-01-20 NOTE — PROGRESS NOTE ADULT - PROBLEM SELECTOR PLAN 4
Currently takes Lithium, Venlafaxine, Klonopin, Effexor, Risperdal, Trazodone, and Fluphenazine. Holding home meds.  - Psych following while inpatient and recommended Prolixin and lorazepam prn.  - Constant observation

## 2018-01-20 NOTE — PROGRESS NOTE ADULT - PROBLEM SELECTOR PLAN 5
Patient not c/o chest pain. No EKG changes. Likely demand.  - Troponin negative, CK-MB + but denies chest pain

## 2018-01-20 NOTE — PROGRESS NOTE ADULT - SUBJECTIVE AND OBJECTIVE BOX
INTERVAL HPI/OVERNIGHT EVENTS: O/N: pt's CK uptrended to 22K on 2 am labs; increased fluids to 400 ccs/hr; repeat bmp ordered for 10 am    SUBJECTIVE: Patient seen and examined at bedside. Using  phone - patient states "Help me Doctor, I am afraid of the green light, Someone is after me." Knows name, states he is in [place], Year 2016 and correctly know [place] president. Patient denies SI/HI.     OBJECTIVE:    VITAL SIGNS:  Vital Signs Last 24 Hrs  T(C): 36.9 (2018 13:03), Max: 37.5 (2018 08:57)  T(F): 98.5 (2018 13:03), Max: 99.5 (2018 08:57)  HR: 100 (2018 12:35) (86 - 102)  BP: 164/80 (2018 12:35) (156/83 - 171/79)  BP(mean): 109 (2018 12:35) (109 - 119)  RR: 16 (2018 12:35) (16 - 18)  SpO2: 95% (2018 08:29) (94% - 99%)       @ : @ 07:00  --------------------------------------------------------  IN: 3200 mL / OUT: 3350 mL / NET: -150 mL     @ 07: @ 17:43  --------------------------------------------------------  IN: 4600 mL / OUT: 4350 mL / NET: 250 mL      PHYSICAL EXAM:  Constitutional: Restless in bed but re-directable to stay in bed, Not fully cooperating with exam, following commands, or answering appropriately  Head: NC/AT  Eyes: PERRL, EOMI, clear conjunctiva  ENT: mildly dry MM  Respiratory: CTA bilaterally  Cardiac: tachycardic, regular rhythm, no M/R/G appreciated  Gastrointestinal: normoactive bowel sounds, abdomen soft, NTND  Extremities: WWP, no clubbing or cyanosis. No peripheral edema  Musculoskeletal: NROM x4. No joint swelling, tenderness or erythema  Vascular: 2+ radial and DP pulses B/L  Neurologic: AAOx1 (self). No focal deficits  Psychiatric: affect and characteristics of appearance, verbalizations, behaviors are inappropriate. +visual hallucinations       MEDICATIONS:  MEDICATIONS  (STANDING):  fluPHENAZine 5 milliGRAM(s) Oral every 12 hours  heparin  Injectable 5000 Unit(s) SubCutaneous every 8 hours  sodium chloride 0.45%. 1000 milliLiter(s) (100 mL/Hr) IV Continuous <Continuous>    MEDICATIONS  (PRN):  fluPHENAZine 5 milliGRAM(s) Oral two times a day PRN Pyschosis / Agitation  LORazepam   Injectable 1 milliGRAM(s) IntraMuscular every 4 hours PRN Agitation      ALLERGIES:  Allergies  No Known Allergies      LABS:                        12.3   23.4  )-----------( 273      ( 2018 08:06 )             37.7     01-20    155<H>  |  118<H>  |  46<H>  ----------------------------<  84  3.6   |  16<L>  |  1.35<H>    Ca    9.3      2018 08:06  Phos  4.1     01-20  Mg     3.1     01-20    TPro  7.2  /  Alb  4.0  /  TBili  0.8  /  DBili  x   /  AST  526<H>  /  ALT  150<H>  /  AlkPhos  98  01-20    PT/INR - ( 2018 14:01 )   PT: 14.1 sec;   INR: 1.26          PTT - ( 2018 14:01 )  PTT:27.9 sec  Urinalysis Basic - ( 2018 16:06 )    Color: Yellow / Appearance: Clear / S.020 / pH: x  Gluc: x / Ketone: Trace mg/dL  / Bili: Small / Urobili: 0.2 E.U./dL   Blood: x / Protein: 100 mg/dL / Nitrite: NEGATIVE   Leuk Esterase: NEGATIVE / RBC: < 5 /HPF / WBC < 5 /HPF   Sq Epi: x / Non Sq Epi: 0-5 /HPF / Bacteria: Many /HPF        RADIOLOGY & ADDITIONAL TESTS: Reviewed.

## 2018-01-20 NOTE — PROGRESS NOTE BEHAVIORAL HEALTH - NSBHFUPINTERVALHXFT_PSY_A_CORE
41 year old [place] male with history of past psych history and recent rhabdomyolysis currently treatment with IVF (hypernatremic and thirsty) who presents with psychotic sxs including seeing colors as well as confusion.  He reported to this writer he does not speak English b/c he is [place] and from [facility].  He appears hypervigilant.  this writer spoke with medicine regarding changing PRN Prolixin to standing Q12H and PRN twice daily 5 mg doses to address psychosis and confusion in the context of delirium.

## 2018-01-21 DIAGNOSIS — R33.9 RETENTION OF URINE, UNSPECIFIED: ICD-10-CM

## 2018-01-21 DIAGNOSIS — E87.0 HYPEROSMOLALITY AND HYPERNATREMIA: ICD-10-CM

## 2018-01-21 DIAGNOSIS — R74.0 NONSPECIFIC ELEVATION OF LEVELS OF TRANSAMINASE AND LACTIC ACID DEHYDROGENASE [LDH]: ICD-10-CM

## 2018-01-21 LAB
ALBUMIN SERPL ELPH-MCNC: 3.6 G/DL — SIGNIFICANT CHANGE UP (ref 3.3–5)
ALP SERPL-CCNC: 94 U/L — SIGNIFICANT CHANGE UP (ref 40–120)
ALT FLD-CCNC: 230 U/L — HIGH (ref 10–45)
ANION GAP SERPL CALC-SCNC: 14 MMOL/L — SIGNIFICANT CHANGE UP (ref 5–17)
ANION GAP SERPL CALC-SCNC: 15 MMOL/L — SIGNIFICANT CHANGE UP (ref 5–17)
AST SERPL-CCNC: 585 U/L — HIGH (ref 10–40)
BILIRUB SERPL-MCNC: 0.8 MG/DL — SIGNIFICANT CHANGE UP (ref 0.2–1.2)
BUN SERPL-MCNC: 19 MG/DL — SIGNIFICANT CHANGE UP (ref 7–23)
BUN SERPL-MCNC: 21 MG/DL — SIGNIFICANT CHANGE UP (ref 7–23)
CALCIUM SERPL-MCNC: 9.1 MG/DL — SIGNIFICANT CHANGE UP (ref 8.4–10.5)
CALCIUM SERPL-MCNC: 9.3 MG/DL — SIGNIFICANT CHANGE UP (ref 8.4–10.5)
CHLORIDE SERPL-SCNC: 112 MMOL/L — HIGH (ref 96–108)
CHLORIDE SERPL-SCNC: 113 MMOL/L — HIGH (ref 96–108)
CK SERPL-CCNC: CRITICAL HIGH U/L (ref 30–200)
CO2 SERPL-SCNC: 20 MMOL/L — LOW (ref 22–31)
CO2 SERPL-SCNC: 21 MMOL/L — LOW (ref 22–31)
CREAT SERPL-MCNC: 0.84 MG/DL — SIGNIFICANT CHANGE UP (ref 0.5–1.3)
CREAT SERPL-MCNC: 0.9 MG/DL — SIGNIFICANT CHANGE UP (ref 0.5–1.3)
CULTURE RESULTS: NO GROWTH — SIGNIFICANT CHANGE UP
GLUCOSE SERPL-MCNC: 122 MG/DL — HIGH (ref 70–99)
GLUCOSE SERPL-MCNC: 132 MG/DL — HIGH (ref 70–99)
HAV IGM SER-ACNC: SIGNIFICANT CHANGE UP
HBV CORE IGM SER-ACNC: SIGNIFICANT CHANGE UP
HBV SURFACE AG SER-ACNC: SIGNIFICANT CHANGE UP
HCT VFR BLD CALC: 36.3 % — LOW (ref 39–50)
HCV AB S/CO SERPL IA: 0.17 S/CO — SIGNIFICANT CHANGE UP
HCV AB SERPL-IMP: SIGNIFICANT CHANGE UP
HGB BLD-MCNC: 11.8 G/DL — LOW (ref 13–17)
MAGNESIUM SERPL-MCNC: 2.2 MG/DL — SIGNIFICANT CHANGE UP (ref 1.6–2.6)
MCHC RBC-ENTMCNC: 29.5 PG — SIGNIFICANT CHANGE UP (ref 27–34)
MCHC RBC-ENTMCNC: 32.5 G/DL — SIGNIFICANT CHANGE UP (ref 32–36)
MCV RBC AUTO: 90.8 FL — SIGNIFICANT CHANGE UP (ref 80–100)
PHOSPHATE SERPL-MCNC: 1.7 MG/DL — LOW (ref 2.5–4.5)
PLATELET # BLD AUTO: 252 K/UL — SIGNIFICANT CHANGE UP (ref 150–400)
POTASSIUM SERPL-MCNC: 3.4 MMOL/L — LOW (ref 3.5–5.3)
POTASSIUM SERPL-MCNC: 3.6 MMOL/L — SIGNIFICANT CHANGE UP (ref 3.5–5.3)
POTASSIUM SERPL-SCNC: 3.4 MMOL/L — LOW (ref 3.5–5.3)
POTASSIUM SERPL-SCNC: 3.6 MMOL/L — SIGNIFICANT CHANGE UP (ref 3.5–5.3)
PROT SERPL-MCNC: 6.6 G/DL — SIGNIFICANT CHANGE UP (ref 6–8.3)
RBC # BLD: 4 M/UL — LOW (ref 4.2–5.8)
RBC # FLD: 14.8 % — SIGNIFICANT CHANGE UP (ref 10.3–16.9)
SODIUM SERPL-SCNC: 147 MMOL/L — HIGH (ref 135–145)
SODIUM SERPL-SCNC: 148 MMOL/L — HIGH (ref 135–145)
SPECIMEN SOURCE: SIGNIFICANT CHANGE UP
WBC # BLD: 14.7 K/UL — HIGH (ref 3.8–10.5)
WBC # FLD AUTO: 14.7 K/UL — HIGH (ref 3.8–10.5)

## 2018-01-21 PROCEDURE — 99233 SBSQ HOSP IP/OBS HIGH 50: CPT | Mod: GC

## 2018-01-21 RX ORDER — POTASSIUM CHLORIDE 20 MEQ
10 PACKET (EA) ORAL
Qty: 0 | Refills: 0 | Status: COMPLETED | OUTPATIENT
Start: 2018-01-21 | End: 2018-01-21

## 2018-01-21 RX ORDER — POTASSIUM PHOSPHATE, MONOBASIC POTASSIUM PHOSPHATE, DIBASIC 236; 224 MG/ML; MG/ML
15 INJECTION, SOLUTION INTRAVENOUS ONCE
Qty: 0 | Refills: 0 | Status: COMPLETED | OUTPATIENT
Start: 2018-01-21 | End: 2018-01-21

## 2018-01-21 RX ADMIN — FLUPHENAZINE HYDROCHLORIDE 5 MILLIGRAM(S): 1 TABLET, FILM COATED ORAL at 22:41

## 2018-01-21 RX ADMIN — POTASSIUM PHOSPHATE, MONOBASIC POTASSIUM PHOSPHATE, DIBASIC 62.5 MILLIMOLE(S): 236; 224 INJECTION, SOLUTION INTRAVENOUS at 15:56

## 2018-01-21 RX ADMIN — HEPARIN SODIUM 5000 UNIT(S): 5000 INJECTION INTRAVENOUS; SUBCUTANEOUS at 06:32

## 2018-01-21 RX ADMIN — Medication 1 MILLIGRAM(S): at 13:18

## 2018-01-21 RX ADMIN — FLUPHENAZINE HYDROCHLORIDE 5 MILLIGRAM(S): 1 TABLET, FILM COATED ORAL at 12:26

## 2018-01-21 RX ADMIN — SODIUM CHLORIDE 100 MILLILITER(S): 9 INJECTION, SOLUTION INTRAVENOUS at 07:53

## 2018-01-21 RX ADMIN — HEPARIN SODIUM 5000 UNIT(S): 5000 INJECTION INTRAVENOUS; SUBCUTANEOUS at 22:41

## 2018-01-21 RX ADMIN — Medication 100 MILLIEQUIVALENT(S): at 15:56

## 2018-01-21 RX ADMIN — Medication 100 MILLIEQUIVALENT(S): at 14:10

## 2018-01-21 RX ADMIN — Medication 100 MILLIEQUIVALENT(S): at 18:01

## 2018-01-21 NOTE — PROGRESS NOTE ADULT - SUBJECTIVE AND OBJECTIVE BOX
PGY-1 TRANSFER ACCEPT NOTE    HOSPITAL COURSE: 51 y/o Kenyan-speaking M with a PMH of schizoaffective disorder (bipolar type), and past psychotic episodes with hospital admissions was BIBA after being found lying on the ground confused, lethargic, and incontinent of stool. History was obtained via chart review and exchange with ED provider and psychiatric consultant. It was reported that patient left home the day prior to presentation and was placed on missing person list by [facility]. Patient lives with mother and reportedly has been compliant with meds. In the ED, VSS. Patient was administered Ativan 2mg x 1 due to agitation. Patient admitted to [facility] for rhabdomyolysis and toxic metabolic encephalopathy. Patient placed on IVF, normal saline switched to 1/2 NS now currently on D5 2/2 hypernatremia. Patient's urine output maintained <200cc/hr. Patient restarted on home fluphenazine and psych consulted. Patient with occasional bouts of anxiety and paranoia. Patient also requesting to drink water frequently. Creatine kinase initially increased from 15K to 23K, now downtrending to 16K. Hypernatremia improved. FELISHA resolved. Elevated liver enzymes likely 2/2 rhabdomyolysis, currently stable. Patient with no complaints of RUQ pain. Patient is HD stable and appropriate for stepdown.     SUBJECTIVE / INTERVAL HPI: Patient seen and examined at bedside. Pt complaining of difficulty urinating, explaining that he feels like he needs to go but nothing is coming out. Pt denied pain with urination or abdominal pain in general. Pt denied f/c/n/v/d, CP, palpitations, SOB, cough, change in BM, HA, dizziness, AH/VH, SI/HI.    VITAL SIGNS:  Vital Signs Last 24 Hrs  T(C): 37.2 (21 Jan 2018 17:15), Max: 37.8 (21 Jan 2018 05:15)  T(F): 98.9 (21 Jan 2018 17:15), Max: 100 (21 Jan 2018 05:15)  HR: 85 (21 Jan 2018 18:06) (56 - 88)  BP: 131/70 (21 Jan 2018 18:06) (131/70 - 172/86)  BP(mean): 95 (21 Jan 2018 18:06) (95 - 122)  RR: 18 (21 Jan 2018 18:06) (16 - 18)  SpO2: 97% (21 Jan 2018 08:20) (93% - 97%)    PHYSICAL EXAM:  General: NAD, laying in bed  HEENT: NC/AT; PERRL, anicteric sclera; MMM  Neck: supple  Cardiovascular: +S1/S2; RR; no m/r/g  Respiratory: CTAB; no W/R/R  Gastrointestinal: soft, NT/ND; +BSx4  Extremities: WWP; no edema, clubbing or cyanosis  Vascular: 2+ radial, DP/PT pulses B/L  Neurological: AAOx2; no focal deficits; follows commands  Psych: Paranoid ideation: asking to speak in private, asking aid to leave room; odd affect; no insight on hospitalization; denied AH/VH, SI/HI    MEDICATIONS:  MEDICATIONS  (STANDING):  dextrose 5%. 1000 milliLiter(s) (100 mL/Hr) IV Continuous <Continuous>  fluPHENAZine 5 milliGRAM(s) Oral every 12 hours  heparin  Injectable 5000 Unit(s) SubCutaneous every 8 hours    MEDICATIONS  (PRN):  fluPHENAZine 5 milliGRAM(s) Oral two times a day PRN Pyschosis / Agitation  LORazepam   Injectable 1 milliGRAM(s) IV Push every 4 hours PRN Agitation      ALLERGIES:  Allergies    No Known Allergies    Intolerances        LABS:                        11.8   14.7  )-----------( 252      ( 21 Jan 2018 06:14 )             36.3     01-21    147<H>  |  112<H>  |  19  ----------------------------<  132<H>  3.4<L>   |  21<L>  |  0.84    Ca    9.1      21 Jan 2018 06:14  Phos  1.7     01-21  Mg     2.2     01-21    TPro  6.6  /  Alb  3.6  /  TBili  0.8  /  DBili  x   /  AST  585<H>  /  ALT  230<H>  /  AlkPhos  94  01-21        CAPILLARY BLOOD GLUCOSE          RADIOLOGY & ADDITIONAL TESTS: Reviewed.

## 2018-01-21 NOTE — PROGRESS NOTE ADULT - SUBJECTIVE AND OBJECTIVE BOX
INTERVAL HPI/OVERNIGHT EVENTS: none     SUBJECTIVE: Patient seen and examined at bedside. ros neg.     OBJECTIVE:    VITAL SIGNS:  ICU Vital Signs Last 24 Hrs  T(C): 37.2 (2018 09:29), Max: 37.8 (2018 05:15)  T(F): 99 (2018 09:29), Max: 100 (2018 05:15)  HR: 88 (2018 08:20) (56 - 100)  BP: 154/91 (2018 08:20) (146/89 - 172/86)  BP(mean): 117 (2018 08:20) (105 - 122)  ABP: --  ABP(mean): --  RR: 17 (2018 08:20) (16 - 18)  SpO2: 97% (2018 08:20) (93% - 97%)         @ 07: @ 07:00  --------------------------------------------------------  IN: 7050 mL / OUT: 8345 mL / NET: -1295 mL     @ 07: @ 10:10  --------------------------------------------------------  IN: 375 mL / OUT: 375 mL / NET: 0 mL      CAPILLARY BLOOD GLUCOSE          PHYSICAL EXAM:    Constitutional: NAD, calm - follows commands  Head: NC/AT  Eyes: PERRL, EOMI, clear conjunctiva  ENT: mildly dry MM  Respiratory: CTA bilaterally  Cardiac: tachycardic, regular rhythm, no M/R/G appreciated  Gastrointestinal: normoactive bowel sounds, abdomen soft, NTND  Extremities: WWP, no clubbing or cyanosis. No peripheral edema  Musculoskeletal: NROM x4. No joint swelling, tenderness or erythema  Vascular: 2+ radial and DP pulses B/L  Neurologic: AAOx1 (self). No focal deficits    MEDICATIONS:  MEDICATIONS  (STANDING):  dextrose 5%. 1000 milliLiter(s) (100 mL/Hr) IV Continuous <Continuous>  fluPHENAZine 5 milliGRAM(s) Oral every 12 hours  heparin  Injectable 5000 Unit(s) SubCutaneous every 8 hours    MEDICATIONS  (PRN):  fluPHENAZine 5 milliGRAM(s) Oral two times a day PRN Pyschosis / Agitation  LORazepam   Injectable 1 milliGRAM(s) IV Push every 4 hours PRN Agitation      ALLERGIES:  Allergies    No Known Allergies    Intolerances        LABS:                        11.8   14.7  )-----------( 252      ( 2018 06:14 )             36.3     -    147<H>  |  112<H>  |  19  ----------------------------<  132<H>  3.4<L>   |  21<L>  |  0.84    Ca    9.1      2018 06:14  Phos  1.7     -  Mg     2.2         TPro  6.6  /  Alb  3.6  /  TBili  0.8  /  DBili  x   /  AST  585<H>  /  ALT  230<H>  /  AlkPhos  94  -21    PT/INR - ( 2018 14:01 )   PT: 14.1 sec;   INR: 1.26          PTT - ( 2018 14:01 )  PTT:27.9 sec  Urinalysis Basic - ( 2018 16:06 )    Color: Yellow / Appearance: Clear / S.020 / pH: x  Gluc: x / Ketone: Trace mg/dL  / Bili: Small / Urobili: 0.2 E.U./dL   Blood: x / Protein: 100 mg/dL / Nitrite: NEGATIVE   Leuk Esterase: NEGATIVE / RBC: < 5 /HPF / WBC < 5 /HPF   Sq Epi: x / Non Sq Epi: 0-5 /HPF / Bacteria: Many /HPF        RADIOLOGY & ADDITIONAL TESTS: Reviewed. Transfer of Care  Patient is a 51yo Guyanese-speaking M with a PMH of schizoaffective disorder (bipolar type), and past psychotic episodes with hospital admissions was BIBA after being found lying on the ground confused, lethargic, and incontinent of stool. History was obtained via chart review and exchange with ED provider and psychiatric consultant. It was reported that patient left home the day prior to presentation and was placed on missing person list by [facility]. Patient lives with mother and reportedly has been compliant with meds. In the ED, VSS. Patient was administered Ativan 2mg x 1 due to agitation. Patient admitted to [facility] for rhabdomyolysis and toxic metabolic encephalopathy. Patient placed on IVF, normal saline switched to 1/2 NS now currently on D5 2/2 hypernatremia. Patient's urine output maintained <200cc/hr. Patient restarted on home fluphenazine and psych consulted. Patient with occasional bouts of anxiety and paranoia. Patient also requesting to drink water frequently. Creatine kinase initially increased from 15K to 23K, now downtrending to 16K. Hypernatremia improved. FELISHA resolved. Elevated liver enzymes likely 2/2 rhabdomyolysis, currently stable. Patient with no complaints of RUQ pain. Patient is HD stable and appropriate for stepdown.     INTERVAL HPI/OVERNIGHT EVENTS: none     SUBJECTIVE: Patient seen and examined at bedside. ros neg.     OBJECTIVE:    VITAL SIGNS:  ICU Vital Signs Last 24 Hrs  T(C): 37.2 (2018 09:29), Max: 37.8 (2018 05:15)  T(F): 99 (2018 09:29), Max: 100 (2018 05:15)  HR: 88 (2018 08:20) (56 - 100)  BP: 154/91 (2018 08:20) (146/89 - 172/86)  BP(mean): 117 (2018 08:20) (105 - 122)  ABP: --  ABP(mean): --  RR: 17 (2018 08:20) (16 - 18)  SpO2: 97% (2018 08:20) (93% - 97%)         @ 07:01  -   @ 07:00  --------------------------------------------------------  IN: 7050 mL / OUT: 8345 mL / NET: -1295 mL     @ 07:01  -   @ 10:10  --------------------------------------------------------  IN: 375 mL / OUT: 375 mL / NET: 0 mL      CAPILLARY BLOOD GLUCOSE          PHYSICAL EXAM:    Constitutional: NAD, calm - follows commands  Head: NC/AT  Eyes: PERRL, EOMI, clear conjunctiva  ENT: mildly dry MM  Respiratory: CTA bilaterally  Cardiac: tachycardic, regular rhythm, no M/R/G appreciated  Gastrointestinal: normoactive bowel sounds, abdomen soft, NTND  Extremities: WWP, no clubbing or cyanosis. No peripheral edema  Musculoskeletal: NROM x4. No joint swelling, tenderness or erythema  Vascular: 2+ radial and DP pulses B/L  Neurologic: AAOx1 (self). No focal deficits    MEDICATIONS:  MEDICATIONS  (STANDING):  dextrose 5%. 1000 milliLiter(s) (100 mL/Hr) IV Continuous <Continuous>  fluPHENAZine 5 milliGRAM(s) Oral every 12 hours  heparin  Injectable 5000 Unit(s) SubCutaneous every 8 hours    MEDICATIONS  (PRN):  fluPHENAZine 5 milliGRAM(s) Oral two times a day PRN Pyschosis / Agitation  LORazepam   Injectable 1 milliGRAM(s) IV Push every 4 hours PRN Agitation      ALLERGIES:  Allergies    No Known Allergies    Intolerances        LABS:                        11.8   14.7  )-----------( 252      ( 2018 06:14 )             36.3         147<H>  |  112<H>  |  19  ----------------------------<  132<H>  3.4<L>   |  21<L>  |  0.84    Ca    9.1      2018 06:14  Phos  1.7       Mg     2.2         TPro  6.6  /  Alb  3.6  /  TBili  0.8  /  DBili  x   /  AST  585<H>  /  ALT  230<H>  /  AlkPhos  94  01-21    PT/INR - ( 2018 14:01 )   PT: 14.1 sec;   INR: 1.26          PTT - ( 2018 14:01 )  PTT:27.9 sec  Urinalysis Basic - ( 2018 16:06 )    Color: Yellow / Appearance: Clear / S.020 / pH: x  Gluc: x / Ketone: Trace mg/dL  / Bili: Small / Urobili: 0.2 E.U./dL   Blood: x / Protein: 100 mg/dL / Nitrite: NEGATIVE   Leuk Esterase: NEGATIVE / RBC: < 5 /HPF / WBC < 5 /HPF   Sq Epi: x / Non Sq Epi: 0-5 /HPF / Bacteria: Many /HPF        RADIOLOGY & ADDITIONAL TESTS: Reviewed.

## 2018-01-21 NOTE — PROGRESS NOTE ADULT - PROBLEM SELECTOR PLAN 4
IMPROVED. Patient found on the ground for an unknown period of time which likely induced the rhabdo. [place] admission of [address] --> downtrending appropriately.   - IVF dextrose 5% 100cc/hr  - Strict I's and O's / had solomon  - Titrate fluids for a goal UOP of 200-300cc/hr  - Trend CK

## 2018-01-21 NOTE — PROGRESS NOTE ADULT - PROBLEM SELECTOR PLAN 5
Currently takes Lithium, Venlafaxine, Klonopin, Effexor, Risperdal, Trazodone, and Fluphenazine. Holding home meds per psych.  - Psych following while inpatient and recommended Prolixin and lorazepam prn.  - Constant observation

## 2018-01-21 NOTE — PROGRESS NOTE ADULT - PROBLEM SELECTOR PLAN 8
IMPROVING. Patient not c/o chest pain. No EKG changes. Likely demand.  - Troponin negative, CK-MB + but denies chest pain

## 2018-01-21 NOTE — PROGRESS NOTE ADULT - PROBLEM SELECTOR PLAN 7
Likely 2/2 rhabdo; however, AST and ALT continue to increase despite decrease in CK  -continue to monitor CMP  -consider RUQ u/s if continues to worsen

## 2018-01-21 NOTE — PROGRESS NOTE ADULT - PROBLEM SELECTOR PLAN 6
IMPROVED s/p IVF. Likely osmotic diuresis in setting of rhabdo vs water loss into cells 2/2 possible seizure.   -c/w D5 100cc/hr  -correct no more than 12 mmol/L/day IMPROVED s/p IVF. Likely dehydration vs osmotic diuresis in setting of rhabdo vs water loss into cells 2/2 possible seizure.   -c/w D5 100cc/hr  -correct no more than 12 mmol/L/day

## 2018-01-22 LAB
ALBUMIN SERPL ELPH-MCNC: 3.8 G/DL — SIGNIFICANT CHANGE UP (ref 3.3–5)
ALP SERPL-CCNC: 87 U/L — SIGNIFICANT CHANGE UP (ref 40–120)
ALT FLD-CCNC: 266 U/L — HIGH (ref 10–45)
ANION GAP SERPL CALC-SCNC: 14 MMOL/L — SIGNIFICANT CHANGE UP (ref 5–17)
AST SERPL-CCNC: 387 U/L — HIGH (ref 10–40)
BILIRUB SERPL-MCNC: 0.5 MG/DL — SIGNIFICANT CHANGE UP (ref 0.2–1.2)
BUN SERPL-MCNC: 13 MG/DL — SIGNIFICANT CHANGE UP (ref 7–23)
CALCIUM SERPL-MCNC: 9.5 MG/DL — SIGNIFICANT CHANGE UP (ref 8.4–10.5)
CHLORIDE SERPL-SCNC: 106 MMOL/L — SIGNIFICANT CHANGE UP (ref 96–108)
CK SERPL-CCNC: 6360 U/L — SIGNIFICANT CHANGE UP (ref 30–200)
CO2 SERPL-SCNC: 24 MMOL/L — SIGNIFICANT CHANGE UP (ref 22–31)
CREAT SERPL-MCNC: 0.66 MG/DL — SIGNIFICANT CHANGE UP (ref 0.5–1.3)
GLUCOSE SERPL-MCNC: 133 MG/DL — HIGH (ref 70–99)
HCT VFR BLD CALC: 39.4 % — SIGNIFICANT CHANGE UP (ref 39–50)
HGB BLD-MCNC: 12.7 G/DL — LOW (ref 13–17)
MAGNESIUM SERPL-MCNC: 2 MG/DL — SIGNIFICANT CHANGE UP (ref 1.6–2.6)
MCHC RBC-ENTMCNC: 29.3 PG — SIGNIFICANT CHANGE UP (ref 27–34)
MCHC RBC-ENTMCNC: 32.2 G/DL — SIGNIFICANT CHANGE UP (ref 32–36)
MCV RBC AUTO: 90.8 FL — SIGNIFICANT CHANGE UP (ref 80–100)
PHOSPHATE SERPL-MCNC: 2.3 MG/DL — LOW (ref 2.5–4.5)
PLATELET # BLD AUTO: 238 K/UL — SIGNIFICANT CHANGE UP (ref 150–400)
POTASSIUM SERPL-MCNC: 3.7 MMOL/L — SIGNIFICANT CHANGE UP (ref 3.5–5.3)
POTASSIUM SERPL-SCNC: 3.7 MMOL/L — SIGNIFICANT CHANGE UP (ref 3.5–5.3)
PROT SERPL-MCNC: 6.9 G/DL — SIGNIFICANT CHANGE UP (ref 6–8.3)
RBC # BLD: 4.34 M/UL — SIGNIFICANT CHANGE UP (ref 4.2–5.8)
RBC # FLD: 14.3 % — SIGNIFICANT CHANGE UP (ref 10.3–16.9)
SODIUM SERPL-SCNC: 144 MMOL/L — SIGNIFICANT CHANGE UP (ref 135–145)
WBC # BLD: 11.4 K/UL — HIGH (ref 3.8–10.5)
WBC # FLD AUTO: 11.4 K/UL — HIGH (ref 3.8–10.5)

## 2018-01-22 PROCEDURE — 99233 SBSQ HOSP IP/OBS HIGH 50: CPT

## 2018-01-22 RX ORDER — FLUPHENAZINE HYDROCHLORIDE 1 MG/1
1 TABLET, FILM COATED ORAL
Qty: 0 | Refills: 0 | COMMUNITY
Start: 2018-01-22

## 2018-01-22 RX ORDER — SODIUM CHLORIDE 9 MG/ML
1000 INJECTION, SOLUTION INTRAVENOUS
Qty: 0 | Refills: 0 | Status: DISCONTINUED | OUTPATIENT
Start: 2018-01-22 | End: 2018-01-22

## 2018-01-22 RX ORDER — HEPARIN SODIUM 5000 [USP'U]/ML
0 INJECTION INTRAVENOUS; SUBCUTANEOUS
Qty: 0 | Refills: 0 | COMMUNITY
Start: 2018-01-22

## 2018-01-22 RX ORDER — POTASSIUM CHLORIDE 20 MEQ
40 PACKET (EA) ORAL ONCE
Qty: 0 | Refills: 0 | Status: COMPLETED | OUTPATIENT
Start: 2018-01-22 | End: 2018-01-22

## 2018-01-22 RX ADMIN — FLUPHENAZINE HYDROCHLORIDE 5 MILLIGRAM(S): 1 TABLET, FILM COATED ORAL at 10:16

## 2018-01-22 RX ADMIN — FLUPHENAZINE HYDROCHLORIDE 5 MILLIGRAM(S): 1 TABLET, FILM COATED ORAL at 21:55

## 2018-01-22 RX ADMIN — SODIUM CHLORIDE 100 MILLILITER(S): 9 INJECTION, SOLUTION INTRAVENOUS at 12:04

## 2018-01-22 RX ADMIN — Medication 40 MILLIEQUIVALENT(S): at 10:16

## 2018-01-22 RX ADMIN — HEPARIN SODIUM 5000 UNIT(S): 5000 INJECTION INTRAVENOUS; SUBCUTANEOUS at 21:55

## 2018-01-22 RX ADMIN — HEPARIN SODIUM 5000 UNIT(S): 5000 INJECTION INTRAVENOUS; SUBCUTANEOUS at 14:50

## 2018-01-22 NOTE — PROGRESS NOTE ADULT - PROBLEM SELECTOR PLAN 6
IMPROVED s/p IVF. Likely dehydration vs osmotic diuresis in setting of rhabdo vs water loss into cells 2/2 possible seizure.   -c/w D5 100cc/hr  -correct no more than 12 mmol/L/day

## 2018-01-22 NOTE — PROGRESS NOTE ADULT - PROBLEM SELECTOR PLAN 2
Likely pre-renal given dehydrated state of patient on admission vs intrinsic 2/2 rhabdo.   - Continue IV hydration.  - Trend creatinine.   - Urine lytes showing intrinsic renal disease - FeNa 1.5%   - f/u renal U/S
Likely pre-renal given dehydrated state of patient on admission vs intrinsic 2/2 rhabdo. Resolved.   - Continue IV hydration.  - Trend creatinine - improved.   - Urine lytes showing intrinsic renal disease - FeNa 1.5%
RESOLVED. Likely pre-renal given dehydrated state of patient on admission vs intrinsic 2/2 rhabdo.   - Continue IV hydration.  - Trend creatinine - improved.   - Urine lytes showing intrinsic renal disease - FeNa 1.5%  - strict I and Os
RESOLVED. Likely pre-renal given dehydrated state of patient on admission vs intrinsic 2/2 rhabdo.   - Continue IV hydration.  - Trend creatinine - improved.   - Urine lytes showing intrinsic renal disease - FeNa 1.5%  - strict I and Os

## 2018-01-22 NOTE — DISCHARGE NOTE ADULT - SECONDARY DIAGNOSIS.
Non-traumatic rhabdomyolysis Hypernatremia Acute renal failure, unspecified acute renal failure type Schizoaffective disorder, bipolar type Toxic metabolic encephalopathy Transaminitis

## 2018-01-22 NOTE — PROGRESS NOTE ADULT - PROBLEM SELECTOR PROBLEM 1
Toxic metabolic encephalopathy

## 2018-01-22 NOTE — PROGRESS NOTE ADULT - SUBJECTIVE AND OBJECTIVE BOX
CC: AMS    OVERNIGHT EVENTS: NICO    SUBJECTIVE / INTERVAL HPI: Patient seen and examined at bedside. Pt had no complaints this AM. ROS neg.    VITAL SIGNS:  Vital Signs Last 24 Hrs  T(C): 36.9 (22 Jan 2018 16:33), Max: 37.3 (21 Jan 2018 21:10)  T(F): 98.5 (22 Jan 2018 16:33), Max: 99.1 (21 Jan 2018 21:10)  HR: 74 (22 Jan 2018 16:33) (65 - 85)  BP: 174/7 (22 Jan 2018 16:33) (131/70 - 174/7)  BP(mean): 95 (21 Jan 2018 18:06) (95 - 95)  RR: 18 (22 Jan 2018 16:33) (16 - 18)  SpO2: 95% (22 Jan 2018 16:33) (73% - 98%)    PHYSICAL EXAM:  General: NAD, laying in bed  HEENT: NC/AT; PERRL, anicteric sclera; MMM  Neck: supple  Cardiovascular: +S1/S2; RR; no m/r/g  Respiratory: CTAB; no W/R/R  Gastrointestinal: soft, NT/ND; +BSx4  Extremities: WWP; no edema, clubbing or cyanosis  Vascular: 2+ radial, DP/PT pulses B/L  Neurological: AAOx2; no focal deficits; follows commands  Psych: Paranoid ideation: asking to speak in private, asking aid to leave room; odd affect; no insight on hospitalization; denied AH/VH, SI/HI    MEDICATIONS:  MEDICATIONS  (STANDING):  fluPHENAZine 5 milliGRAM(s) Oral every 12 hours  heparin  Injectable 5000 Unit(s) SubCutaneous every 8 hours  lactated ringers. 1000 milliLiter(s) (100 mL/Hr) IV Continuous <Continuous>    MEDICATIONS  (PRN):  fluPHENAZine 5 milliGRAM(s) Oral two times a day PRN Pyschosis / Agitation  LORazepam   Injectable 1 milliGRAM(s) IV Push every 4 hours PRN Agitation      ALLERGIES:  Allergies    No Known Allergies    Intolerances        LABS:                        12.7   11.4  )-----------( 238      ( 22 Jan 2018 07:56 )             39.4     01-22    144  |  106  |  13  ----------------------------<  133<H>  3.7   |  24  |  0.66    Ca    9.5      22 Jan 2018 07:56  Phos  2.3     01-22  Mg     2.0     01-22    TPro  6.9  /  Alb  3.8  /  TBili  0.5  /  DBili  x   /  AST  387<H>  /  ALT  266<H>  /  AlkPhos  87  01-22        CAPILLARY BLOOD GLUCOSE          RADIOLOGY & ADDITIONAL TESTS: Reviewed.

## 2018-01-22 NOTE — DISCHARGE NOTE ADULT - CARE PROVIDER_API CALL
Gonzalez Ladd  [facility]   [address] [place] [address]    [phone]  Phone: (   )    -  Fax: (   )    -

## 2018-01-22 NOTE — DISCHARGE NOTE ADULT - PROVIDER TOKENS
FREE:[LAST:[Wilberto],FIRST:[Gonzalez],PHONE:[(   )    -],FAX:[(   )    -],ADDRESS:[[facility]   [address] [place] [address]    [phone]]]

## 2018-01-22 NOTE — DISCHARGE NOTE ADULT - PATIENT PORTAL LINK FT
“You can access the [facility] Patient Portal, offered by [facility], by registering with the following website: [email or web address]”

## 2018-01-22 NOTE — PROGRESS NOTE ADULT - ATTENDING COMMENTS
Patient seen and examined with house-staff during bedside rounds.  Resident note read, including vitals, physical findings, laboratory data, and radiological reports.   Revisions included below.  Direct personal management at bed side and extensive interpretation of the data.  Plan was outlined and discussed in details with the housestaff.  Decision making of high complexity  The renal function is normal. He is tolerating PO.  The LFT are elevated and could be related to hypoperfusion.  BP is elevated.  OOB and transfer to floor
Pt seen and examined, pt says tonyt he lives at home with his mother, wife Rhoda, and 21 yr old son- different than hx obtained by psych.  Pt says that he was out running around the block b/c he though someone was chasing him.  He says he went to the park and called the police himself, different from EMS report.  He denies SI/SA, no homicidal thoughts.  Syas he is compliant with his psych meds, but says he only has a hx of depression.  Gave me the # to his psychiatrist and agreed to let us call for collateral to family and psych.  Exam as above, labs reviewed.  Agreed with rest of assessment and plan.  Will d/w psych regarding overall plan and if pt would benefit from [facility] for medication mgmt.

## 2018-01-22 NOTE — PROGRESS NOTE ADULT - PROBLEM SELECTOR PLAN 3
Patient found on the ground for an unknown period of time which likely induced the rhabdo. [place] of [address].  - IVF changed to 1/2NS 100cc/hr with good goal UOP of 200-300cc/hr and 2/2 hyperNa  - Gonzales placed  - Strict I's and O's  - Titrate fluids for a goal UOP of 200-300cc/hr  - Trend CK
Gonzales removed 4:30PM; pt complaining of urge to urinate without success; pt denied pain, abdominal pain.  -f/u bladder scan  -TOV until MDNT; consider straight cath and repeat TOV if continues to retain
Gonzales removed 4:30PM; pt complaining of urge to urinate without success; pt denied pain, abdominal pain.  -f/u bladder scan  -TOV until MDNT; consider straight cath and repeat TOV if continues to retain
Patient found on the ground for an unknown period of time which likely induced the rhabdo. [place] admission of 70[address] --> downtrending appropiately.   - IVF dextrose 5% 100cc/hr  - Strict I's and O's / had solomon  - Titrate fluids for a goal UOP of 200-300cc/hr  - Trend CK

## 2018-01-22 NOTE — DISCHARGE NOTE ADULT - ADDITIONAL INSTRUCTIONS
Continue care on [facility] Continue care on [facility]; please order CMP and CK in the AM and encourage PO intake

## 2018-01-22 NOTE — PROGRESS NOTE BEHAVIORAL HEALTH - NSBHFUPINTERVALHXFT_PSY_A_CORE
Pt seen; chart reviewed. Discussed with pt's mother and team.    Pt initially interviewed with assistance of En (#[id number] of telephonic interpreting service). Denied any c/o. Pt recalled events leading to his admit on 1/19, though did not recall meeting me in the ED. Reported that his mother visited him in the hospital yesterday. Pt acknowledged having had visual hallucinations of "people walking around and not sleeping," though denied any current visual hallucinations. Also denied any auditory hallucinations or paranoia. Also denied any suicidal or homicidal ideation/intent or plan. Focused on desire to be discharged.    Mother, contacted via phone ([phone]) with assistance of Sarah (#[id number] of telephonic interpreting service). reported that though pt is better oriented, he is still not at his baseline. She expressed desire to have pt briefly admitted to inpt psychiatry, also stated that pt's outpt psychiatrist wanted pt to be admitted.    Pt approached regarding the above with assistance of Nicholas (#[id number] of telephonic interpreting service). He accepted recommendation for voluntary admit to [facility], signed the 9.13 legal papers.

## 2018-01-22 NOTE — PROGRESS NOTE ADULT - ASSESSMENT
51yo Panamanian speaking male with a PMHx of schizoaffective disorder (bipolar type), and past psychotic episodes with hospital admissions was BIBA after being found lying on the ground confused and incontinent of stool, admitted for toxic metabolic encephalopathy, FELISHA, and non traumatic rhabdomyolysis.
53 y/o Slovak speaking M w/PMH of schizoaffective disorder (bipolar type) and past psychotic episodes with hospital admissions BIBA after being found lying on the ground, confused and incontinent of stool, admitted for toxic metabolic encephalopathy, FELISHA, and non traumatic rhabdomyolysis.
53 y/o Tongan speaking M w/PMH of schizoaffective disorder (bipolar type) and past psychotic episodes with hospital admissions BIBA after being found lying on the ground, confused and incontinent of stool, admitted for toxic metabolic encephalopathy, FELISHA, and non traumatic rhabdomyolysis.
53yo Nigerien speaking male with a PMHx of schizoaffective disorder (bipolar type), and past psychotic episodes with hospital admissions was BIBA after being found lying on the ground confused and incontinent of stool, admitted for toxic metabolic encephalopathy, FELISHA, and non traumatic rhabdomyolysis.

## 2018-01-22 NOTE — PROGRESS NOTE BEHAVIORAL HEALTH - OTHER
In bed "Better" Less confused Stable Per team and mother, still with residual delusions and paranoia. Improved

## 2018-01-22 NOTE — PROGRESS NOTE ADULT - PROBLEM SELECTOR PLAN 4
IMPROVED. Patient found on the ground for an unknown period of time which likely induced the rhabdo. [place] admission of 330[address] --> downtrending appropriately.   - IVF dextrose 5% 100cc/hr  - Strict I's and O's / had solomon  - Titrate fluids for a goal UOP of 200-300cc/hr  - Trend CK

## 2018-01-22 NOTE — PROGRESS NOTE BEHAVIORAL HEALTH - NSBHCHARTREVIEWLAB_PSY_A_CORE FT
Na+ 155
WBC 11.4 (from 14.7 yesterday)    AST//230 (from 387/266 yesterday)    CK 6360 (from 79308 yesterday)

## 2018-01-22 NOTE — PROGRESS NOTE ADULT - PROBLEM SELECTOR PROBLEM 4
Schizoaffective disorder, bipolar type
Non-traumatic rhabdomyolysis
Non-traumatic rhabdomyolysis
Schizoaffective disorder, bipolar type

## 2018-01-22 NOTE — PROGRESS NOTE BEHAVIORAL HEALTH - NSBHCONSULTMEDS_PSY_A_CORE FT
prolixin 5mg Q12H
Will restart pt's maintenance medications:    Lithium 600 mg po BID, Venlafaxine 225 mg po qdaily, clonazapem 1 mg po qhs, trazodone 100 mg po qhs, benztropine 1 mg po qhs, prolixin (will continue 5 mg po BID instead of maintenance 5 mg po qdaily).   Will hold maintenance risperidone 1 mg po qhs.

## 2018-01-22 NOTE — DISCHARGE NOTE ADULT - CARE PLAN
Principal Discharge DX:	Altered mental status, unspecified altered mental status type  Goal:	Provide the appropriate treatment to return mental status to baseline  Assessment and plan of treatment:	You were bought in by EMS after being found down and confused, which was most likely due to medication nonadherence in setting of new life stressors. Changes in mental status were exacerbated by poor PO intake, rhabdomyolysis, and hypernatremia. Mental status improved after restarting psych medications and IV fluids.  Secondary Diagnosis:	Non-traumatic rhabdomyolysis  Assessment and plan of treatment:	Labs were significant for elevated CK levels, which improved with IV fluids. Pt no longer needs IV fluids  Secondary Diagnosis:	Hypernatremia  Assessment and plan of treatment:	Labs significant for hypernatremia, which is likely due to aggressive IV fluids and corrected on D5. Pt no longer needs IV fluids.  Secondary Diagnosis:	Acute renal failure, unspecified acute renal failure type  Assessment and plan of treatment:	Labs were significant for elevated Cr, which was likely due to hypovolemia, as renal function improved to baseline with IV fluids.  Secondary Diagnosis:	Schizoaffective disorder, bipolar type  Assessment and plan of treatment:	Psych was consulted and held home medications, starting only Prolixin and Ativan. Psych medication will be optimized on 8U.  Secondary Diagnosis:	Toxic metabolic encephalopathy  Assessment and plan of treatment:	You were bought in by EMS after being found down and confused, which was most likely due to medication nonadherence in setting of new life stressors. Changes in mental status were exacerbated by poor PO intake, rhabdomyolysis, and hypernatremia. Mental status improved after restarting psych medications and IV fluids.  Secondary Diagnosis:	Transaminitis  Assessment and plan of treatment:	You LFTs were elevated, likely in the setting of rhabdomyolysis as LFTs are improving as rhabdo resolves.

## 2018-01-22 NOTE — PROGRESS NOTE ADULT - PROBLEM SELECTOR PLAN 1
Unclear etiology. Differential includes but not limited to uremia vs dehydration. Patient with normal utox and alcohol level wnl as well. Unlikely infectious in etiology given no source and patient afebrile. Leukocytosis likely 2/2 leukemoid reaction. Also low suspicious for meningitis since physical exam does not correlate. CT head not showing any acute lesions. Lactate now cleared.   - IV hydration  - Trend BMP for resolution of uremia and closure of anion gap.   - Continue to monitor mental status and redirect patient
Unclear etiology. Differential includes but not limited to uremia vs dehydration. Patient with normal utox and alcohol level wnl as well. Unlikely infectious in etiology given no source and patient afebrile. Leukocytosis likely 2/2 leukemoid reaction. Also low suspicious for meningitis since physical exam does not correlate. CT head not showing any acute lesions. Lactate now cleared.   - IV hydration  - Trend BMP for resolution of uremia and closure of anion gap.   - Continue to monitor mental status and redirect patient
Unclear etiology. Differential includes but not limited to uremia vs dehydration. Patient with normal utox and alcohol level wnl as well. Unlikely infectious in etiology given no source and patient afebrile. Leukocytosis likely 2/2 leukemoid reaction. Also low suspicious for meningitis since physical exam does not correlate. CT head not showing any acute lesions. Lactate now cleared.   - IV hydration  - Trend BMP; uremia and anion gap improved  - Continue to monitor mental status and redirect patient
Unclear etiology. Differential includes but not limited to uremia vs dehydration. Patient with normal utox and alcohol level wnl as well. Unlikely infectious in etiology given no source and patient afebrile. Leukocytosis likely 2/2 leukemoid reaction. Also low suspicious for meningitis since physical exam does not correlate. CT head not showing any acute lesions. Lactate now cleared.   - IV hydration  - Trend BMP; uremia and anion gap improved  - Continue to monitor mental status and redirect patient

## 2018-01-22 NOTE — DISCHARGE NOTE ADULT - PLAN OF CARE
Provide the appropriate treatment to return mental status to baseline You were bought in by EMS after being found down and confused, which was most likely due to medication nonadherence in setting of new life stressors. Changes in mental status were exacerbated by poor PO intake, rhabdomyolysis, and hypernatremia. Mental status improved after restarting psych medications and IV fluids. Labs were significant for elevated CK levels, which improved with IV fluids. Pt no longer needs IV fluids Labs significant for hypernatremia, which is likely due to aggressive IV fluids and corrected on D5. Pt no longer needs IV fluids. Labs were significant for elevated Cr, which was likely due to hypovolemia, as renal function improved to baseline with IV fluids. Psych was consulted and held home medications, starting only Prolixin and Ativan. Psych medication will be optimized on 8U. You LFTs were elevated, likely in the setting of rhabdomyolysis as LFTs are improving as rhabdo resolves.

## 2018-01-22 NOTE — DISCHARGE NOTE ADULT - MEDICATION SUMMARY - MEDICATIONS TO TAKE
I will START or STAY ON the medications listed below when I get home from the hospital:    heparin  -- Indication: For DVT ppx    LORazepam  -- Indication: For Schizoaffective disorder, bipolar type    fluPHENAZine 5 mg oral tablet  -- 1 tab(s) by mouth every 12 hours  -- Indication: For Schizoaffective disorder, bipolar type    fluPHENAZine 5 mg oral tablet  -- 1 tab(s) by mouth 2 times a day, As needed, Pyschosis / Agitation  -- Indication: For Schizoaffective disorder, bipolar type

## 2018-01-22 NOTE — DISCHARGE NOTE ADULT - HOSPITAL COURSE
51 y/o [place] citizen-speaking M with a PMH of schizoaffective disorder (bipolar type), and past psychotic episodes with hospital admissions was BIBA after being found lying on the ground confused, lethargic, and incontinent of stool. History was obtained via chart review and exchange with ED provider and psychiatric consultant. It was reported that patient left home the day prior to presentation and was placed on missing person list by [facility]. Patient lives with mother and reportedly has been compliant with meds. In the ED, VSS. Patient was administered Ativan 2mg x 1 due to agitation. Patient admitted to [facility] for rhabdomyolysis and toxic metabolic encephalopathy. Patient placed on IVF, normal saline switched to 1/2 NS now currently on D5 2/2 hypernatremia. Patient's urine output maintained <200cc/hr. Patient restarted on home fluphenazine and psych consulted. Patient with occasional bouts of anxiety and paranoia. Patient also requesting to drink water frequently. Creatine kinase initially increased from 15K to 23K, now downtrending to 16K. Hypernatremia improved. FELISHA resolved. Elevated liver enzymes likely 2/2 rhabdomyolysis, currently stable. Patient with no complaints of RUQ pain. Patient is HD stable and appropriate for stepdown to [facility]. Gonzales removed, and pt passed TOV with good UOP. CK 6000s, Na normalized, LFTs downtrending and IVF d/c'd. Pt afebrile and hemodynamically stable, deemed medically cleared for transfer to 8U.

## 2018-01-23 ENCOUNTER — INPATIENT (INPATIENT)
Facility: HOSPITAL | Age: 53
LOS: 12 days | Discharge: HOME | DRG: 885 | End: 2018-02-05
Attending: PSYCHIATRY & NEUROLOGY | Admitting: PSYCHIATRY & NEUROLOGY
Payer: MEDICAID

## 2018-01-23 VITALS
WEIGHT: 169.09 LBS | SYSTOLIC BLOOD PRESSURE: 156 MMHG | HEART RATE: 70 BPM | HEIGHT: 71 IN | DIASTOLIC BLOOD PRESSURE: 97 MMHG | RESPIRATION RATE: 17 BRPM | TEMPERATURE: 99 F

## 2018-01-23 VITALS
OXYGEN SATURATION: 98 % | SYSTOLIC BLOOD PRESSURE: 154 MMHG | DIASTOLIC BLOOD PRESSURE: 102 MMHG | TEMPERATURE: 98 F | HEART RATE: 88 BPM | RESPIRATION RATE: 19 BRPM

## 2018-01-23 LAB
ALBUMIN SERPL ELPH-MCNC: 3.5 G/DL — SIGNIFICANT CHANGE UP (ref 3.3–5)
ALP SERPL-CCNC: 76 U/L — SIGNIFICANT CHANGE UP (ref 40–120)
ALT FLD-CCNC: 221 U/L — HIGH (ref 10–45)
ANION GAP SERPL CALC-SCNC: 12 MMOL/L — SIGNIFICANT CHANGE UP (ref 5–17)
AST SERPL-CCNC: 178 U/L — HIGH (ref 10–40)
BILIRUB SERPL-MCNC: 0.4 MG/DL — SIGNIFICANT CHANGE UP (ref 0.2–1.2)
BUN SERPL-MCNC: 16 MG/DL — SIGNIFICANT CHANGE UP (ref 7–23)
CALCIUM SERPL-MCNC: 9.2 MG/DL — SIGNIFICANT CHANGE UP (ref 8.4–10.5)
CHLORIDE SERPL-SCNC: 105 MMOL/L — SIGNIFICANT CHANGE UP (ref 96–108)
CK SERPL-CCNC: 1839 U/L — HIGH (ref 30–200)
CO2 SERPL-SCNC: 26 MMOL/L — SIGNIFICANT CHANGE UP (ref 22–31)
CREAT SERPL-MCNC: 0.77 MG/DL — SIGNIFICANT CHANGE UP (ref 0.5–1.3)
GLUCOSE SERPL-MCNC: 118 MG/DL — HIGH (ref 70–99)
POTASSIUM SERPL-MCNC: 3.5 MMOL/L — SIGNIFICANT CHANGE UP (ref 3.5–5.3)
POTASSIUM SERPL-SCNC: 3.5 MMOL/L — SIGNIFICANT CHANGE UP (ref 3.5–5.3)
PROT SERPL-MCNC: 6.8 G/DL — SIGNIFICANT CHANGE UP (ref 6–8.3)
SODIUM SERPL-SCNC: 143 MMOL/L — SIGNIFICANT CHANGE UP (ref 135–145)

## 2018-01-23 PROCEDURE — 99239 HOSP IP/OBS DSCHRG MGMT >30: CPT

## 2018-01-23 PROCEDURE — 99233 SBSQ HOSP IP/OBS HIGH 50: CPT

## 2018-01-23 RX ORDER — FLUPHENAZINE HYDROCHLORIDE 1 MG/1
5 TABLET, FILM COATED ORAL EVERY 6 HOURS
Qty: 0 | Refills: 0 | Status: DISCONTINUED | OUTPATIENT
Start: 2018-01-23 | End: 2018-02-05

## 2018-01-23 RX ORDER — LITHIUM CARBONATE 300 MG/1
600 TABLET, EXTENDED RELEASE ORAL DAILY
Qty: 0 | Refills: 0 | Status: DISCONTINUED | OUTPATIENT
Start: 2018-01-23 | End: 2018-01-31

## 2018-01-23 RX ORDER — CLONAZEPAM 1 MG
1 TABLET ORAL EVERY 6 HOURS
Qty: 0 | Refills: 0 | Status: DISCONTINUED | OUTPATIENT
Start: 2018-01-23 | End: 2018-01-30

## 2018-01-23 RX ORDER — CLONAZEPAM 1 MG
1 TABLET ORAL AT BEDTIME
Qty: 0 | Refills: 0 | Status: DISCONTINUED | OUTPATIENT
Start: 2018-01-23 | End: 2018-01-30

## 2018-01-23 RX ORDER — FLUPHENAZINE HYDROCHLORIDE 1 MG/1
5 TABLET, FILM COATED ORAL AT BEDTIME
Qty: 0 | Refills: 0 | Status: DISCONTINUED | OUTPATIENT
Start: 2018-01-23 | End: 2018-01-29

## 2018-01-23 RX ORDER — ACETAMINOPHEN 500 MG
650 TABLET ORAL EVERY 6 HOURS
Qty: 0 | Refills: 0 | Status: DISCONTINUED | OUTPATIENT
Start: 2018-01-23 | End: 2018-02-05

## 2018-01-23 RX ORDER — SODIUM CHLORIDE 9 MG/ML
1000 INJECTION, SOLUTION INTRAVENOUS
Qty: 0 | Refills: 0 | Status: DISCONTINUED | OUTPATIENT
Start: 2018-01-23 | End: 2018-01-23

## 2018-01-23 RX ORDER — BENZTROPINE MESYLATE 1 MG
1 TABLET ORAL AT BEDTIME
Qty: 0 | Refills: 0 | Status: DISCONTINUED | OUTPATIENT
Start: 2018-01-23 | End: 2018-02-05

## 2018-01-23 RX ORDER — LITHIUM CARBONATE 300 MG/1
600 TABLET, EXTENDED RELEASE ORAL AT BEDTIME
Qty: 0 | Refills: 0 | Status: DISCONTINUED | OUTPATIENT
Start: 2018-01-23 | End: 2018-01-31

## 2018-01-23 RX ORDER — FLUPHENAZINE HYDROCHLORIDE 1 MG/1
5 TABLET, FILM COATED ORAL DAILY
Qty: 0 | Refills: 0 | Status: DISCONTINUED | OUTPATIENT
Start: 2018-01-23 | End: 2018-02-05

## 2018-01-23 RX ORDER — VENLAFAXINE HCL 75 MG
225 CAPSULE, EXT RELEASE 24 HR ORAL DAILY
Qty: 0 | Refills: 0 | Status: DISCONTINUED | OUTPATIENT
Start: 2018-01-23 | End: 2018-01-26

## 2018-01-23 RX ORDER — POTASSIUM CHLORIDE 20 MEQ
20 PACKET (EA) ORAL
Qty: 0 | Refills: 0 | Status: DISCONTINUED | OUTPATIENT
Start: 2018-01-23 | End: 2018-01-23

## 2018-01-23 RX ADMIN — Medication 1 MILLIGRAM(S): at 21:44

## 2018-01-23 RX ADMIN — FLUPHENAZINE HYDROCHLORIDE 5 MILLIGRAM(S): 1 TABLET, FILM COATED ORAL at 21:43

## 2018-01-23 RX ADMIN — Medication 1 MILLIGRAM(S): at 21:43

## 2018-01-23 RX ADMIN — LITHIUM CARBONATE 600 MILLIGRAM(S): 300 TABLET, EXTENDED RELEASE ORAL at 21:44

## 2018-01-23 RX ADMIN — Medication 20 MILLIEQUIVALENT(S): at 13:31

## 2018-01-23 RX ADMIN — HEPARIN SODIUM 5000 UNIT(S): 5000 INJECTION INTRAVENOUS; SUBCUTANEOUS at 05:55

## 2018-01-23 RX ADMIN — FLUPHENAZINE HYDROCHLORIDE 5 MILLIGRAM(S): 1 TABLET, FILM COATED ORAL at 13:31

## 2018-01-23 RX ADMIN — Medication 20 MILLIEQUIVALENT(S): at 10:56

## 2018-01-23 NOTE — PROGRESS NOTE BEHAVIORAL HEALTH - NSBHADMITCOUNSEL_PSY_A_CORE
instructions for management, treatment and follow up/risk factor reduction/client/family/caregiver education/importance of adherence to chosen treatment
risks and benefits of treatment options/instructions for management, treatment and follow up/risk factor reduction/client/family/caregiver education

## 2018-01-23 NOTE — PROGRESS NOTE BEHAVIORAL HEALTH - NSBHFUPINTERVALHXFT_PSY_A_CORE
Pt seen; chart reviewed; discussed with team.  Pt's admit to [facility] was postponed until today due to concerns regarding pt's LFT's. LFT's today trending down. Pt remained otherwise medically stable overnight.  Pt again approached today, with assistance of telephonic  Donna (#[phone]). He is A + O x 4, cooperative, understands my role. He remains agreeable regarding voluntary admit to [facility] for rx stabilization. Pt currently denies SI/HI, AH/VH/PI.

## 2018-01-23 NOTE — PROGRESS NOTE BEHAVIORAL HEALTH - PROBLEM SELECTOR PLAN 2
Pt's LFT's trending downward. However, pt with fatty liver. Will recheck CMP in AM. Will also order medicine consult. Pt's LFT's trending downward. However, pt with fatty liver. Will recheck CMP in AM. Will also order medicine consult.    Problem/Plan--3:  Increased CK (normalized): Encourage fluid intake.

## 2018-01-23 NOTE — PROGRESS NOTE BEHAVIORAL HEALTH - PROBLEM SELECTOR PLAN 1
Continue Prolixin 5 mg po BID.  Restart pt's maintenance medications including LiCO3 600 mg po BID; Venlafaxine 225 mg po qdaily; Clonazepam 1 mg po qhs; trazodone 100 mg po qhs; benztropine 1 mg po qhs. Hold risperidone 1 mg po qhs.    Follow up with pt's outpt psychiatrist.

## 2018-01-23 NOTE — PROGRESS NOTE BEHAVIORAL HEALTH - OTHER
In bed "Not as good as yesterday." Less confused Stable Per team and mother, still with residual delusions and paranoia. Improved

## 2018-01-23 NOTE — PROGRESS NOTE BEHAVIORAL HEALTH - DETAILS
"I don't feel as good as I did yesterday."--No specific complaints. "I don't feel as good as I did yesterday. I haven't slept in 10 days."

## 2018-01-23 NOTE — PROGRESS NOTE BEHAVIORAL HEALTH - RISK ASSESSMENT
unpredictable behaviors on CO
Pt with no current or past suicidal or homicidal ideation/intent/plan. Is at low to moderate risk for dangerous behavior.

## 2018-01-23 NOTE — PROGRESS NOTE BEHAVIORAL HEALTH - SUMMARY
41 year old male with history of Schizoaffective disorder bipolar type and recent rhabdomyolysis on IVF who appears confused with psychotic sxs.
52-year-old [place]-born male with long hx of schizoaffective disorder, bipolar type, at least 3 prior inpt psychiatric admits, reportedly compliant with medication regimen, found wandering on the street, brought in by EMS, noted to be paranoid, delirious with increased CPK, WBC and LFT's.    Pt with improving mentation, decreased paranoia, but is still not at his baseline.    Pt agreeing to voluntary inpt psychiatric admit for observation and medication stabilization.    Legal papers signed.
52-year-old [place]-born male with long hx of schizoaffective disorder, bipolar type, at least 3 prior inpt psychiatric admits, reportedly compliant with medication regimen, found wandering on the street, brought in by EMS, noted to be paranoid, delirious with increased CPK, WBC and LFT's.    Pt with improving mentation, decreased paranoia, but per family is still not at his baseline.    Pt agreeing to voluntary inpt psychiatric admit for observation and medication stabilization.    Legal papers signed.

## 2018-01-24 DIAGNOSIS — F25.0 SCHIZOAFFECTIVE DISORDER, BIPOLAR TYPE: ICD-10-CM

## 2018-01-24 DIAGNOSIS — G92 TOXIC ENCEPHALOPATHY: ICD-10-CM

## 2018-01-24 LAB
ALBUMIN SERPL ELPH-MCNC: 3.5 G/DL — SIGNIFICANT CHANGE UP (ref 3.3–5)
ALP SERPL-CCNC: 69 U/L — SIGNIFICANT CHANGE UP (ref 40–120)
ALT FLD-CCNC: 203 U/L — HIGH (ref 10–45)
ANION GAP SERPL CALC-SCNC: 11 MMOL/L — SIGNIFICANT CHANGE UP (ref 5–17)
AST SERPL-CCNC: 127 U/L — HIGH (ref 10–40)
BILIRUB SERPL-MCNC: 0.2 MG/DL — SIGNIFICANT CHANGE UP (ref 0.2–1.2)
BUN SERPL-MCNC: 20 MG/DL — SIGNIFICANT CHANGE UP (ref 7–23)
CALCIUM SERPL-MCNC: 9.4 MG/DL — SIGNIFICANT CHANGE UP (ref 8.4–10.5)
CHLORIDE SERPL-SCNC: 108 MMOL/L — SIGNIFICANT CHANGE UP (ref 96–108)
CO2 SERPL-SCNC: 26 MMOL/L — SIGNIFICANT CHANGE UP (ref 22–31)
CREAT SERPL-MCNC: 0.88 MG/DL — SIGNIFICANT CHANGE UP (ref 0.5–1.3)
CULTURE RESULTS: SIGNIFICANT CHANGE UP
CULTURE RESULTS: SIGNIFICANT CHANGE UP
GLUCOSE SERPL-MCNC: 125 MG/DL — HIGH (ref 70–99)
HBA1C BLD-MCNC: 5.3 % — SIGNIFICANT CHANGE UP (ref 4–5.6)
POTASSIUM SERPL-MCNC: 3.7 MMOL/L — SIGNIFICANT CHANGE UP (ref 3.5–5.3)
POTASSIUM SERPL-SCNC: 3.7 MMOL/L — SIGNIFICANT CHANGE UP (ref 3.5–5.3)
PROT SERPL-MCNC: 6.4 G/DL — SIGNIFICANT CHANGE UP (ref 6–8.3)
SODIUM SERPL-SCNC: 145 MMOL/L — SIGNIFICANT CHANGE UP (ref 135–145)
SPECIMEN SOURCE: SIGNIFICANT CHANGE UP
SPECIMEN SOURCE: SIGNIFICANT CHANGE UP

## 2018-01-24 PROCEDURE — 99231 SBSQ HOSP IP/OBS SF/LOW 25: CPT

## 2018-01-24 RX ORDER — HYDROXYZINE HCL 10 MG
50 TABLET ORAL EVERY 6 HOURS
Qty: 0 | Refills: 0 | Status: DISCONTINUED | OUTPATIENT
Start: 2018-01-24 | End: 2018-02-05

## 2018-01-24 RX ORDER — TRAZODONE HCL 50 MG
100 TABLET ORAL AT BEDTIME
Qty: 0 | Refills: 0 | Status: DISCONTINUED | OUTPATIENT
Start: 2018-01-24 | End: 2018-01-25

## 2018-01-24 RX ADMIN — LITHIUM CARBONATE 600 MILLIGRAM(S): 300 TABLET, EXTENDED RELEASE ORAL at 10:23

## 2018-01-24 RX ADMIN — Medication 50 MILLIGRAM(S): at 14:51

## 2018-01-24 RX ADMIN — Medication 225 MILLIGRAM(S): at 10:23

## 2018-01-24 RX ADMIN — Medication 1 MILLIGRAM(S): at 21:59

## 2018-01-24 RX ADMIN — FLUPHENAZINE HYDROCHLORIDE 5 MILLIGRAM(S): 1 TABLET, FILM COATED ORAL at 10:23

## 2018-01-24 RX ADMIN — LITHIUM CARBONATE 600 MILLIGRAM(S): 300 TABLET, EXTENDED RELEASE ORAL at 21:59

## 2018-01-24 RX ADMIN — FLUPHENAZINE HYDROCHLORIDE 5 MILLIGRAM(S): 1 TABLET, FILM COATED ORAL at 21:59

## 2018-01-24 NOTE — BEHAVIORAL HEALTH ASSESSMENT NOTE - SUMMARY
52-year-old [place]-born male with long hx of schizoaffective disorder, bipolar type, at least 3 prior inpt psychiatric admits, reportedly compliant with and stable for years on his medication regimen, presents acutely psychotic with paranoid ideation and delusional thought content likely triggered by the recent death of his father in December. Treated on medical floor for rhabdomyolysis, now cleared by medicine but with persistent paranoia. Patient currently in denial of the death of his father and continues to believe he was told by [employer] to leave his home and was being chased by the police. Has been restarted on his longstanding home regimen of Prolixin/Cogentin, Effexor, Lithium, and Klonopin. Prolixin had recently been switched by his outpatient psychiatrist 3 months ago to Risperdal but has been restarted on Prolixin on this hospitalization given long history of stabilization on this medicine. Patient with no evidence of current mood symptoms or SI/HI but requires inpatient hospitalization for stabilization of ongoing acute psychosis and for discharge planning.

## 2018-01-24 NOTE — BEHAVIORAL HEALTH ASSESSMENT NOTE - NSBHADMITIPSTRENGTH_PSY_A_CORE
Financial stability/Cooperative with treatment/Has supportive interpersonal relationships with family, friends or peers/Has access to housing/residential stability

## 2018-01-24 NOTE — BEHAVIORAL HEALTH ASSESSMENT NOTE - NSBHCHARTREVIEWVS_PSY_A_CORE FT
Vital Signs Last 24 Hrs  T(C): 37.2 (24 Jan 2018 06:32), Max: 37.2 (24 Jan 2018 06:32)  T(F): 98.9 (24 Jan 2018 06:32), Max: 98.9 (24 Jan 2018 06:32)  HR: 90 (24 Jan 2018 08:21) (90 - 110)  BP: 119/70 (24 Jan 2018 08:21) (119/70 - 159/97)  RR: 18 (24 Jan 2018 08:21) (18 - 18)

## 2018-01-24 NOTE — BEHAVIORAL HEALTH ASSESSMENT NOTE - OTHER PAST PSYCHIATRIC HISTORY (INCLUDE DETAILS REGARDING ONSET, COURSE OF ILLNESS, INPATIENT/OUTPATIENT TREATMENT)
patient's first psychotic break 30 years ago in context of father being jailed in [place]    Pt with at least 3 prior inpt psychiatric admits:   2003: [place] pt stopped his medications, went AWOL, [place] and admitted to [facility].  2005: Admitted to [facility] due to ave, psychosis, paranoia, auditory hallucinations. Thought he was being followed by [employer]  2015: [facility] x 10-12 days.    In outpt treatment at [facility] ([phone]); Dr. Quintero

## 2018-01-24 NOTE — BEHAVIORAL HEALTH ASSESSMENT NOTE - RISK ASSESSMENT
Pt with no evidence of suicidal or homicidal ideation, intent or plan.  However, due to AMS and paranoia, is currently unable to care for self. Pt with no evidence of suicidal or homicidal ideation, intent or plan.  However, due to acute psychosis w/ paranoia, is currently unable to care for self.    Chronic: longstanding history of schizoaffective disorder; distant suicide attempt (age 18)  Modifiable: acute impact of recent death of father; paranoid ideation and delusional thoughts  Protective: no history of substance abuse; no recent suicide attempt; supportive family; stable residence

## 2018-01-24 NOTE — BEHAVIORAL HEALTH ASSESSMENT NOTE - MODIFICATIONS
continue current regime: Klonopin; Prozac; Cogentin;  Lithium with monitoring.  Monitor paranoid thinking and coherence of thought;

## 2018-01-24 NOTE — BEHAVIORAL HEALTH ASSESSMENT NOTE - HPI (INCLUDE ILLNESS QUALITY, SEVERITY, DURATION, TIMING, CONTEXT, MODIFYING FACTORS, ASSOCIATED SIGNS AND SYMPTOMS)
As per admitting ED clinician:   "Briefly, pt is 52-year-old [place]-born male with long hx of schizoaffective disorder, bipolar type, at least 3 prior inpt psychiatric admits, reportedly compliant with medication regimen, found wandering on the street, brought in by EMS. Mother reportedly called police to report pt missing.  Pt lives at home, is reportedly very compliant with his rx regimen.   Pt's father  on . Mother reports pt "unravelled" after father's death. During memorial service on , pt was "manic and hysterical." Pt brought to [facility], was initially admitted to medicine with plan that he would then be admitted to inpt psychiatry. However, he was discharged."    INTERIM: patient stabilized on medical floor following episode of rhabdomyolysis; CPK and LFTs now downtrending; but with continuing paranoia requiring inpatient psychiatric care    TODAY'S ASSESSMENT:  Patient seen As per admitting ED clinician:   "Briefly, pt is 52-year-old [place]-born male with long hx of schizoaffective disorder, bipolar type, at least 3 prior inpt psychiatric admits, reportedly compliant with medication regimen, found wandering on the street, brought in by EMS. Mother reportedly called police to report pt missing.  Pt lives at home, is reportedly very compliant with his rx regimen.   Pt's father  on . Mother reports pt "unravelled" after father's death. During memorial service on , pt was "manic and hysterical." Pt brought to [facility], was initially admitted to medicine with plan that he would then be admitted to inpt psychiatry. However, he was discharged."    INTERIM: patient stabilized on medical floor following episode of rhabdomyolysis; CPK and LFTs now downtrending; but with continuing paranoia requiring inpatient psychiatric care    TODAY'S ASSESSMENT:  Patient seen and examined this afternoon with aid of [place] phone  ID [phone].  and attending psychiatrist were present.  Patient reports that the reason for his current admission is "confidential" and believes he was ordered by [employer] to leave his home and was being chased by police prior to his admission. He also believes that his father is still living despite confirmed reports from collateral that his father passed at the end of December. Despite evidence of ongoing acute psychosis with paranoid ideation and delusional thought content, patient denies any auditory or visual hallucinations at this time or any SI/HI or mood symptoms. He does report 8 days of ongoing insomnia as well as running out of his medications about 9 days ago.     Per patient's outpatient psychiatrist and mother/son, patient has a longstanding history of schizoaffective disorder bipolar type which was well-controlled for years (on regimen of Prolixin/Cogentin, Effexor, Lithium, Klonopin) until the recent death of his father, who the patient helped care for, following which the patient decompensated, became acutely disorganized and agitated and left the home, requiring patient's mother to file a missing person report. Patient's psychiatrist also reports that patient was switched from Prolixin to Risperdal 3 months ago to good effect.     Patient denies any history of substance abuse, trauma/abuse, legal history, or access to weapons in the home. He reports his current mood as "good" though he does admit to longstanding history of mood instability which has been well-controlled with medication.

## 2018-01-24 NOTE — BEHAVIORAL HEALTH ASSESSMENT NOTE - CASE SUMMARY
52-year-old [place]-born male with long hx of schizoaffective disorder, bipolar type, at least 3 prior inpt psychiatric admits, reportedly compliant with medication regimen, found wandering on the street, brought in by EMS. Mother reportedly called police to report pt missing.  Pt lives at home, is reportedly very compliant with his rx regimen.  Pt's father  on . Mother reports pt "unravelled" after father's death. During [facility] service on , pt was "manic and hysterical." Pt brought to [facility], was initially admitted to medicine with plan that he would then be admitted to inpt psychiatry. However, he was discharged."  ;;: interviewed twice using Hungarian translation from [employer] (Translators# [id number])  alert; oriented x3;  registers 3/3 and recalls 3/3; EOMS full; tongue protrusion wnl ; no fasiculations; peripheral vision intact; able to identify 2 fingers;  speech clear; articulate and spontaneous; paranoid ideas about the patient working for [employer] and being persecuted by crimminals.  denies AVH or SI or HI.  Complains of poor sleep;  ij: poor; not acknowledging some sxs or awareness of how circumstances (father;s death) impacted on him.  States that father had emotional problems.  Past admission at [facility] on current regime but recently restarted on Prolixin but off of meds for nine days.  No substance abuse.   Lives with wife and grown son and mother.   10th grade education; not working recently; Denies past SI or HI.  Won't discuss certain areas which he calls "confidential"  amenable to taking meds.  Denies significant medical issues; no allergies.

## 2018-01-24 NOTE — BEHAVIORAL HEALTH ASSESSMENT NOTE - PROBLEM SELECTOR PLAN 1
- C/w home psychiatric regimen C/w home psychiatric regimen  - c/w Prolixin 5 mg BID and Cogentin 1mg qhs  - c/w Effexor 225 mg daily  - c/w Lithium 600 mg BID  - c/w Klonopin 1 mg qhs

## 2018-01-24 NOTE — BEHAVIORAL HEALTH ASSESSMENT NOTE - NSBHCHARTREVIEWLAB_PSY_A_CORE FT
Utox negative on admission  Hep panel negative on 01/21/18  CPK and liver enzymes downtrending    01-24    145  |  108  |  20  ----------------------------<  125<H>  3.7   |  26  |  0.88    Ca    9.4      24 Jan 2018 07:32    TPro  6.4  /  Alb  3.5  /  TBili  0.2  /  DBili  x   /  AST  127<H>  /  ALT  203<H>  /  AlkPhos  69  01-24

## 2018-01-24 NOTE — BEHAVIORAL HEALTH ASSESSMENT NOTE - NSBHREFERDETAILS_PSY_A_CORE_FT
stable for discharge from the medical floor but with ongoing paranoia requiring inpatient care stable for discharge from the medical floor but with ongoing paranoia requiring inpatient psychiatric care

## 2018-01-24 NOTE — BEHAVIORAL HEALTH ASSESSMENT NOTE - NSBHSOCIALHXDETAILSFT_PSY_A_CORE
born in [place]. lives with mother, wife of 23 years and 21 year old son Tobi in [place]. 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

## 2018-01-24 NOTE — BEHAVIORAL HEALTH ASSESSMENT NOTE - NSBHCHARTREVIEWIMAGING_PSY_A_CORE FT
< from: CT Head No Cont (01.19.18 @ 15:24) >    INTERPRETATION:  -  CT OF THE HEAD:    Clinical information:  51 y/o male,  AMS.     Multiple axial scans of the head were obtained without intravenous   contrast medium administration. Sagittal and coronal reformatted images   were created.    A extracerebral CSF collection is noted in the left anterior temporal   region measuring 1.5 x 3.5 cm in size. There is minimal mass effect with   effacement of the adjacent cerebral sulci. The findings are indicative of   an arachnoid cyst.    The ventricles and other subarachnoid spaces are minimally dilated.  No   intracranial hemorrhage is seen.  There is no abnormal density in the   brain parenchyma to indicate an infarct.    The bony structures are intact.   Both mastoid bones are well pneumatized   and appear normal.  The sella turcica is normal in size.  Both orbits   appear normal.  The visualized paranasal sinuses are clear.    IMPRESSION:-    1.  No intracranial hemorrhage or infarct..    2.  An arachnoid cyst in the left anterior temporal fossa.     END OF IMPRESSION.    < end of copied text >

## 2018-01-25 DIAGNOSIS — N17.9 ACUTE KIDNEY FAILURE, UNSPECIFIED: ICD-10-CM

## 2018-01-25 DIAGNOSIS — E87.0 HYPEROSMOLALITY AND HYPERNATREMIA: ICD-10-CM

## 2018-01-25 DIAGNOSIS — R41.82 ALTERED MENTAL STATUS, UNSPECIFIED: ICD-10-CM

## 2018-01-25 DIAGNOSIS — M62.82 RHABDOMYOLYSIS: ICD-10-CM

## 2018-01-25 DIAGNOSIS — F25.9 SCHIZOAFFECTIVE DISORDER, UNSPECIFIED: ICD-10-CM

## 2018-01-25 DIAGNOSIS — F25.0 SCHIZOAFFECTIVE DISORDER, BIPOLAR TYPE: ICD-10-CM

## 2018-01-25 DIAGNOSIS — K76.0 FATTY (CHANGE OF) LIVER, NOT ELSEWHERE CLASSIFIED: ICD-10-CM

## 2018-01-25 DIAGNOSIS — G40.909 EPILEPSY, UNSPECIFIED, NOT INTRACTABLE, WITHOUT STATUS EPILEPTICUS: ICD-10-CM

## 2018-01-25 DIAGNOSIS — G92 TOXIC ENCEPHALOPATHY: ICD-10-CM

## 2018-01-25 DIAGNOSIS — R74.0 NONSPECIFIC ELEVATION OF LEVELS OF TRANSAMINASE AND LACTIC ACID DEHYDROGENASE [LDH]: ICD-10-CM

## 2018-01-25 DIAGNOSIS — E86.0 DEHYDRATION: ICD-10-CM

## 2018-01-25 PROCEDURE — 93010 ELECTROCARDIOGRAM REPORT: CPT

## 2018-01-25 PROCEDURE — 99231 SBSQ HOSP IP/OBS SF/LOW 25: CPT

## 2018-01-25 RX ORDER — TRAZODONE HCL 50 MG
100 TABLET ORAL AT BEDTIME
Qty: 0 | Refills: 0 | Status: DISCONTINUED | OUTPATIENT
Start: 2018-01-25 | End: 2018-02-05

## 2018-01-25 RX ADMIN — Medication 1 MILLIGRAM(S): at 21:46

## 2018-01-25 RX ADMIN — LITHIUM CARBONATE 600 MILLIGRAM(S): 300 TABLET, EXTENDED RELEASE ORAL at 21:45

## 2018-01-25 RX ADMIN — FLUPHENAZINE HYDROCHLORIDE 5 MILLIGRAM(S): 1 TABLET, FILM COATED ORAL at 10:26

## 2018-01-25 RX ADMIN — FLUPHENAZINE HYDROCHLORIDE 5 MILLIGRAM(S): 1 TABLET, FILM COATED ORAL at 21:46

## 2018-01-25 RX ADMIN — Medication 225 MILLIGRAM(S): at 10:25

## 2018-01-25 RX ADMIN — LITHIUM CARBONATE 600 MILLIGRAM(S): 300 TABLET, EXTENDED RELEASE ORAL at 10:26

## 2018-01-25 RX ADMIN — Medication 100 MILLIGRAM(S): at 21:46

## 2018-01-25 NOTE — PROGRESS NOTE BEHAVIORAL HEALTH - PROBLEM SELECTOR PLAN 1
C/w home psychiatric regimen  - c/w Prolixin 5 mg BID and Cogentin 1mg qhs  - c/w Effexor 225 mg daily  - c/w Lithium 600 mg BID  - c/w Klonopin 1 mg qhs

## 2018-01-25 NOTE — PROGRESS NOTE BEHAVIORAL HEALTH - SUMMARY
52-year-old [place]-born male with long hx of schizoaffective disorder, bipolar type, at least 3 prior inpt psychiatric admits, reportedly compliant with and stable for years on his medication regimen, presents acutely psychotic with paranoid ideation and delusional thought content likely triggered by the recent death of his father in December. Treated on medical floor for rhabdomyolysis, now cleared by medicine but with persistent paranoia. Patient currently in denial of the death of his father and continues to believe he was told by [employer] to leave his home and was being chased by the police. Has been restarted on his longstanding home regimen of Prolixin/Cogentin, Effexor, Lithium, and Klonopin. Prolixin had recently been switched by his outpatient psychiatrist 3 months ago to Risperdal but has been restarted on Prolixin on this hospitalization given long history of stabilization on this medicine. Patient with no evidence of current mood symptoms or SI/HI but requires inpatient hospitalization for stabilization of ongoing acute psychosis and for discharge planning.

## 2018-01-25 NOTE — PROGRESS NOTE BEHAVIORAL HEALTH - NSBHFUPINTERVALHXFT_PSY_A_CORE
Patient seen and examined this morning w/ assistance of phone  [place], [place] [id number].  Patient reports "4 of 5" mood, which signifies good but not great mood. He reports no adverse effects of his medications. Continuing paranoid ideation and delusional thought content-- e.g. continues to believe his father is alive, and refuses to divulge further information regarding the circumstances of admission: "by the [place] , you have no right to ask me personal questions." He reports his sleep was slightly improved with Trazodone in the evening. No issues with appetite/energy. Denies AH/VH.  Per nursing report, patient is cooperative and compliant. Received Atarax for anxiety yesterday evening.    Per MS3 evaluation -  Patient was examined at bedside. No acute events overnight. No complaints at this time. Patient refused physical exam again today. Continues to be isolative and refuses to discuss his psychiatric condition. However, he denies AVH, SI/HI.

## 2018-01-25 NOTE — PROGRESS NOTE BEHAVIORAL HEALTH - RISK ASSESSMENT
Pt with no evidence of suicidal or homicidal ideation, intent or plan.  However, due to acute psychosis w/ paranoia, is currently unable to care for self.    Chronic: longstanding history of schizoaffective disorder; distant suicide attempt (age 18)  Modifiable: acute impact of recent death of father; paranoid ideation and delusional thoughts  Protective: no history of substance abuse; no recent suicide attempt; supportive family; stable residence

## 2018-01-26 PROCEDURE — 99231 SBSQ HOSP IP/OBS SF/LOW 25: CPT

## 2018-01-26 RX ORDER — VENLAFAXINE HCL 75 MG
150 CAPSULE, EXT RELEASE 24 HR ORAL DAILY
Qty: 0 | Refills: 0 | Status: DISCONTINUED | OUTPATIENT
Start: 2018-01-26 | End: 2018-02-05

## 2018-01-26 RX ADMIN — FLUPHENAZINE HYDROCHLORIDE 5 MILLIGRAM(S): 1 TABLET, FILM COATED ORAL at 21:14

## 2018-01-26 RX ADMIN — Medication 1 MILLIGRAM(S): at 21:14

## 2018-01-26 RX ADMIN — LITHIUM CARBONATE 600 MILLIGRAM(S): 300 TABLET, EXTENDED RELEASE ORAL at 21:14

## 2018-01-26 RX ADMIN — FLUPHENAZINE HYDROCHLORIDE 5 MILLIGRAM(S): 1 TABLET, FILM COATED ORAL at 09:38

## 2018-01-26 RX ADMIN — LITHIUM CARBONATE 600 MILLIGRAM(S): 300 TABLET, EXTENDED RELEASE ORAL at 09:38

## 2018-01-26 RX ADMIN — Medication 225 MILLIGRAM(S): at 09:38

## 2018-01-26 RX ADMIN — Medication 100 MILLIGRAM(S): at 21:14

## 2018-01-26 NOTE — PROGRESS NOTE BEHAVIORAL HEALTH - SUMMARY
52-year-old [place]-born male with long hx of schizoaffective disorder, bipolar type, at least 3 prior inpt psychiatric admits, reportedly compliant with and stable for years on his medication regimen, presents acutely psychotic with paranoid ideation and delusional thought content likely triggered by the recent death of his father in December. Treated on medical floor for rhabdomyolysis, now cleared by medicine but with persistent paranoia. Patient currently in denial of the death of his father and continues to believe he was told by [employer] to leave his home and was being chased by the police. Has been restarted on his longstanding home regimen of Prolixin/Cogentin, Effexor, Lithium, and Klonopin. Prolixin had recently been switched by his outpatient psychiatrist 3 months ago to Risperdal but has been restarted on Prolixin on this hospitalization given long history of stabilization on this medicine. Patient with no evidence of current mood symptoms or SI/HI but requires inpatient hospitalization for stabilization of ongoing acute psychosis and for discharge planning.

## 2018-01-26 NOTE — PROGRESS NOTE BEHAVIORAL HEALTH - NSBHFUPINTERVALHXFT_PSY_A_CORE
New Zealander phone  [place] ID [id number]  Patient seen and examined at bedside together with attending psychiatrist. Case discussed with treatment team.  Patient reports no current mood issues, denies depression, anxiety, hopelessness, irritability. Denies SI/HI/AH/VH. He reports that he was able to sleep last night after taking Trazodone, which is his first good sleep in many days. No issues with appetite, energy, concentration. He reports feeling safe on the unit.  Patient continues to believe his father is living; when questioned further about their relationship, he only reports that he last saw him one and a half months ago prior to a hernia surgery at [facility] and he refuses to discuss further "for privacy reasons". 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 [place] phone  [place] ID [id number]  Patient seen and examined at bedside together with attending psychiatrist. Case discussed with treatment team.  Patient reports no current mood issues, denies depression, anxiety, hopelessness, irritability. Denies SI/HI/AH/VH. He reports that he was able to sleep last night after taking Trazodone, which is his first good sleep in many days. No issues with appetite, energy, concentration. He reports feeling safe on the unit.  Patient continues to believe his father is living; when questioned further about their relationship, he only reports that he last saw him one and a half months ago prior to a hernia surgery at [facility] and he refuses to discuss further "for privacy reasons". 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      Per MS4 eval -   Patient seen and examined today; spoke to patient directly in [place]i to provide sense of comfort. Patient remains isolative and paranoid with delusional thought content. Patient reports improved sleep and better spirits. He reports sleeping for 3 hrs last night, which is a great improvement over the previous night when he did not sleep at all. Reports good appetite and feels safe here. Denies AVH, SI/HI.

## 2018-01-27 RX ADMIN — Medication 150 MILLIGRAM(S): at 09:55

## 2018-01-27 RX ADMIN — FLUPHENAZINE HYDROCHLORIDE 5 MILLIGRAM(S): 1 TABLET, FILM COATED ORAL at 21:20

## 2018-01-27 RX ADMIN — Medication 100 MILLIGRAM(S): at 21:20

## 2018-01-27 RX ADMIN — Medication 1 MILLIGRAM(S): at 21:16

## 2018-01-27 RX ADMIN — FLUPHENAZINE HYDROCHLORIDE 5 MILLIGRAM(S): 1 TABLET, FILM COATED ORAL at 09:56

## 2018-01-27 RX ADMIN — Medication 1 MILLIGRAM(S): at 21:20

## 2018-01-27 RX ADMIN — LITHIUM CARBONATE 600 MILLIGRAM(S): 300 TABLET, EXTENDED RELEASE ORAL at 09:55

## 2018-01-27 RX ADMIN — LITHIUM CARBONATE 600 MILLIGRAM(S): 300 TABLET, EXTENDED RELEASE ORAL at 21:16

## 2018-01-28 RX ADMIN — FLUPHENAZINE HYDROCHLORIDE 5 MILLIGRAM(S): 1 TABLET, FILM COATED ORAL at 09:59

## 2018-01-28 RX ADMIN — Medication 150 MILLIGRAM(S): at 09:59

## 2018-01-28 RX ADMIN — LITHIUM CARBONATE 600 MILLIGRAM(S): 300 TABLET, EXTENDED RELEASE ORAL at 09:59

## 2018-01-28 RX ADMIN — Medication 100 MILLIGRAM(S): at 21:44

## 2018-01-28 RX ADMIN — LITHIUM CARBONATE 600 MILLIGRAM(S): 300 TABLET, EXTENDED RELEASE ORAL at 21:44

## 2018-01-28 RX ADMIN — FLUPHENAZINE HYDROCHLORIDE 5 MILLIGRAM(S): 1 TABLET, FILM COATED ORAL at 21:44

## 2018-01-28 RX ADMIN — Medication 1 MILLIGRAM(S): at 21:45

## 2018-01-28 RX ADMIN — Medication 1 MILLIGRAM(S): at 21:44

## 2018-01-28 NOTE — PROGRESS NOTE BEHAVIORAL HEALTH - NSBHFUPINTERVALHXFT_PSY_A_CORE
No acute overnight events. Patient largely isolative in bed, but compliant with medications. Patient seen in his room, MD attempted to use  phone (Indian) but patient waived it away stating "I just need my meds." Patient asked to clarify but closed the door and waved.

## 2018-01-29 RX ORDER — FLUPHENAZINE HYDROCHLORIDE 1 MG/1
10 TABLET, FILM COATED ORAL AT BEDTIME
Qty: 0 | Refills: 0 | Status: DISCONTINUED | OUTPATIENT
Start: 2018-01-29 | End: 2018-02-05

## 2018-01-29 RX ORDER — BENZTROPINE MESYLATE 1 MG
1 TABLET ORAL DAILY
Qty: 0 | Refills: 0 | Status: DISCONTINUED | OUTPATIENT
Start: 2018-01-29 | End: 2018-02-05

## 2018-01-29 RX ADMIN — Medication 100 MILLIGRAM(S): at 21:27

## 2018-01-29 RX ADMIN — Medication 1 MILLIGRAM(S): at 21:27

## 2018-01-29 RX ADMIN — Medication 150 MILLIGRAM(S): at 10:06

## 2018-01-29 RX ADMIN — LITHIUM CARBONATE 600 MILLIGRAM(S): 300 TABLET, EXTENDED RELEASE ORAL at 10:05

## 2018-01-29 RX ADMIN — FLUPHENAZINE HYDROCHLORIDE 5 MILLIGRAM(S): 1 TABLET, FILM COATED ORAL at 10:06

## 2018-01-29 RX ADMIN — FLUPHENAZINE HYDROCHLORIDE 10 MILLIGRAM(S): 1 TABLET, FILM COATED ORAL at 21:27

## 2018-01-29 RX ADMIN — LITHIUM CARBONATE 600 MILLIGRAM(S): 300 TABLET, EXTENDED RELEASE ORAL at 21:27

## 2018-01-29 NOTE — PROGRESS NOTE BEHAVIORAL HEALTH - NSBHFUPINTERVALHXFT_PSY_A_CORE
Patient seen and examined. Case discussed with treatment team. No acute events overnight. As per nursing report, patient is refusing to use to  phone but remains paranoid and isolative.  As per MS 4 nicole, who is fluent in and interviewed patient in his preferred language of Citizen of [place]:  On examination, patient speaks pleasantly but refuses to talk for an extended period of time. Takes his medication obediently. Denies SI/HI, or AVH. States that he is feeling like himself. Patient seen and examined. Case discussed with treatment team. No acute events overnight. As per nursing report, patient is refusing to use to  phone but remains paranoid and isolative.  As per MS 4 nicole, who is fluent in and interviewed patient in his preferred language of [place]:  On examination, patient speaks pleasantly but refuses to talk for an extended period of time. Takes his medication obediently. Denies SI/HI, or AVH. States that he is feeling like himself.    w/ aid of [employer]  ID [id number]  On this resident's evaluation, patient reported "4 out of 5" mood, signifying good mood. Stated he slept well and felt rested. Reports family visited twice over the weekend. No reported mood symptoms, including anxiety or depression and denies SI/HI/AH/VH.  Notably, patient continues to have fixed delusions regarding his father (who he believes is alive) and his involvement in [employer]. 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 Patient seen and examined. Case discussed with treatment team. No acute events overnight. As per nursing report, patient is refusing to use to  phone but remains paranoid and isolative.  As per MS 4 nicole, who is fluent in and interviewed patient in his preferred language of Kyrgy[place]:  On examination, patient speaks pleasantly but refuses to talk for an extended period of time. Takes his medication obediently. Denies SI/HI, or AVH. States that he is feeling like himself.    w/ aid of [employer]  ID [id number]  On this resident's evaluation, patient reported "4 out of 5" mood, signifying good mood. Stated he slept well and felt rested. Reports family visited twice over the weekend. No reported mood symptoms, including anxiety or depression and denies SI/HI/AH/VH.  Notably, patient continues to have fixed delusions regarding his father (who he believes is alive) and his involvement in [employer]. 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

## 2018-01-29 NOTE — PROGRESS NOTE BEHAVIORAL HEALTH - PROBLEM SELECTOR PLAN 1
C/w home psychiatric regimen  - c/w Prolixin 5 mg BID and Cogentin 1mg qhs  - c/w Effexor 150 mg daily  - c/w Lithium 600 mg BID  - c/w Klonopin 1 mg qhs C/w home psychiatric regimen  - Increase Prolixin to 5 mg daily / 10 mg qhs and Cogentin to 1mg BID  - c/w Effexor 150 mg daily  - c/w Lithium 600 mg BID  - c/w Klonopin 1 mg qhs

## 2018-01-29 NOTE — PROGRESS NOTE BEHAVIORAL HEALTH - SUMMARY
52-year-old Mong[place]-born male with long hx of schizoaffective disorder, bipolar type, at least 3 prior inpt psychiatric admits, reportedly compliant with and stable for years on his medication regimen, presents acutely psychotic with paranoid ideation and delusional thought content likely triggered by the recent death of his father in December. Treated on medical floor for rhabdomyolysis, now cleared by medicine but with persistent paranoia. Patient currently in denial of the death of his father and continues to believe he was told by [employer] to leave his home and was being chased by the police. Has been restarted on his longstanding home regimen of Prolixin/Cogentin, Effexor, Lithium, and Klonopin. Prolixin had recently been switched by his outpatient psychiatrist 3 months ago to Risperdal but has been restarted on Prolixin on this hospitalization given long history of stabilization on this medicine. Patient with no evidence of current mood symptoms or SI/HI but requires inpatient hospitalization for stabilization of ongoing acute psychosis and for discharge planning. 52-year-old [place]-born male with long hx of schizoaffective disorder, bipolar type, at least 3 prior inpt psychiatric admits, reportedly compliant with and stable for years on his medication regimen, presents acutely psychotic with paranoid ideation and delusional thought content likely triggered by the recent death of his father in December. Treated on medical floor for rhabdomyolysis, now cleared by medicine but with persistent paranoia. Patient currently in denial of the death of his father and continues to believe he was told by [employer] to leave his home and was being chased by the police. Has been restarted on his longstanding home regimen of Prolixin/Cogentin, Effexor, Lithium, and Klonopin. Prolixin had recently been switched by his outpatient psychiatrist 3 months ago to Risperdal but has been restarted on Prolixin on this hospitalization given long history of stabilization on this medicine. Prolixin has been titrated up given persistent paranoia. Patient with no evidence of current mood symptoms or SI/HI but requires inpatient hospitalization for stabilization of ongoing acute psychosis and for discharge planning.

## 2018-01-30 PROCEDURE — 99231 SBSQ HOSP IP/OBS SF/LOW 25: CPT

## 2018-01-30 RX ORDER — CLONAZEPAM 1 MG
1 TABLET ORAL AT BEDTIME
Qty: 0 | Refills: 0 | Status: DISCONTINUED | OUTPATIENT
Start: 2018-01-30 | End: 2018-02-05

## 2018-01-30 RX ORDER — CLONAZEPAM 1 MG
1 TABLET ORAL EVERY 6 HOURS
Qty: 0 | Refills: 0 | Status: DISCONTINUED | OUTPATIENT
Start: 2018-01-30 | End: 2018-02-05

## 2018-01-30 RX ADMIN — Medication 100 MILLIGRAM(S): at 21:23

## 2018-01-30 RX ADMIN — FLUPHENAZINE HYDROCHLORIDE 5 MILLIGRAM(S): 1 TABLET, FILM COATED ORAL at 09:49

## 2018-01-30 RX ADMIN — Medication 1 MILLIGRAM(S): at 21:22

## 2018-01-30 RX ADMIN — LITHIUM CARBONATE 600 MILLIGRAM(S): 300 TABLET, EXTENDED RELEASE ORAL at 21:23

## 2018-01-30 RX ADMIN — FLUPHENAZINE HYDROCHLORIDE 10 MILLIGRAM(S): 1 TABLET, FILM COATED ORAL at 21:23

## 2018-01-30 RX ADMIN — Medication 1 MILLIGRAM(S): at 10:39

## 2018-01-30 RX ADMIN — LITHIUM CARBONATE 600 MILLIGRAM(S): 300 TABLET, EXTENDED RELEASE ORAL at 10:39

## 2018-01-30 RX ADMIN — Medication 150 MILLIGRAM(S): at 10:38

## 2018-01-30 RX ADMIN — Medication 1 MILLIGRAM(S): at 21:23

## 2018-01-30 NOTE — PROGRESS NOTE BEHAVIORAL HEALTH - PROBLEM SELECTOR PLAN 1
C/w home psychiatric regimen  - c/w Prolixin 15 mg total daily dose and Cogentin 1mg qhs  - c/w Effexor 150 mg daily  - Lithium 600mg po bid for mood ; check level.   - c/w Lithium 600 mg BID  - c/w Klonopin 1 mg qhs

## 2018-01-30 NOTE — PROGRESS NOTE BEHAVIORAL HEALTH - SUMMARY
52-year-old [place]-born male with long hx of schizoaffective disorder, bipolar type, at least 3 prior inpt psychiatric admits, reportedly compliant with and stable for years on his medication regimen, presents acutely psychotic with paranoid ideation and delusional thought content likely triggered by the recent death of his father in December. Treated on medical floor for rhabdomyolysis, now cleared by medicine but with persistent paranoia. Patient currently in denial of the death of his father and continues to believe he was told by [employer] to leave his home and was being chased by the police. Has been restarted on his longstanding home regimen of Prolixin/Cogentin, Effexor, Lithium, and Klonopin. Prolixin had recently been switched by his outpatient psychiatrist 3 months ago to Risperdal but has been restarted on Prolixin on this hospitalization given long history of stabilization on this medicine. Patient with no evidence of current mood symptoms or SI/HI but required  inpatient hospitalization for stabilization of ongoing acute psychosis and for discharge planning.    ;;1/30: No behavioral issues.  On Prolixin 15mg total daily dose and Effexor XR 150mg for depression but bp slightly elevated. Vital Signs Last 24 Hrs.  BP: 146/90 (30 Jan 2018 10:00) (127/78 - 146/90) to be monitored.  Klonopin 1mg at night; Lithium (SR) 600mg po bid level needed.  nterviewed using [place] translation #[id number] [employer].  Patient slept well. ate well.  had visitors (Mother and wife) and visit went well.  Says he is not as preoccupied with his father as before.  Plans to go home and take his medications.  Polite but otherwise guarded.  Pleasant.

## 2018-01-30 NOTE — PROGRESS NOTE BEHAVIORAL HEALTH - NSBHFUPINTERVALHXFT_PSY_A_CORE
No behavioral issues.  On Prolixin 15mg total daily dose and Effexor XR 150mg for depression but bp slightly elevated. Vital Signs Last 24 Hrs  BP: 146/90 (30 Jan 2018 10:00) (127/78 - 146/90) to be monitored.  Klonopin 1mg at night; Lithium (SR) 600mg po bid level needed.

## 2018-01-31 LAB
ALBUMIN SERPL ELPH-MCNC: 3.4 G/DL — SIGNIFICANT CHANGE UP (ref 3.3–5)
ALP SERPL-CCNC: 68 U/L — SIGNIFICANT CHANGE UP (ref 40–120)
ALT FLD-CCNC: 39 U/L — SIGNIFICANT CHANGE UP (ref 10–45)
ANION GAP SERPL CALC-SCNC: 9 MMOL/L — SIGNIFICANT CHANGE UP (ref 5–17)
AST SERPL-CCNC: 13 U/L — SIGNIFICANT CHANGE UP (ref 10–40)
BILIRUB SERPL-MCNC: 0.2 MG/DL — SIGNIFICANT CHANGE UP (ref 0.2–1.2)
BUN SERPL-MCNC: 19 MG/DL — SIGNIFICANT CHANGE UP (ref 7–23)
CALCIUM SERPL-MCNC: 9.4 MG/DL — SIGNIFICANT CHANGE UP (ref 8.4–10.5)
CHLORIDE SERPL-SCNC: 102 MMOL/L — SIGNIFICANT CHANGE UP (ref 96–108)
CHOLEST SERPL-MCNC: 133 MG/DL — SIGNIFICANT CHANGE UP (ref 10–199)
CK SERPL-CCNC: 35 U/L — SIGNIFICANT CHANGE UP (ref 30–200)
CO2 SERPL-SCNC: 27 MMOL/L — SIGNIFICANT CHANGE UP (ref 22–31)
CREAT SERPL-MCNC: 0.92 MG/DL — SIGNIFICANT CHANGE UP (ref 0.5–1.3)
GLUCOSE SERPL-MCNC: 120 MG/DL — HIGH (ref 70–99)
HDLC SERPL-MCNC: 45 MG/DL — SIGNIFICANT CHANGE UP (ref 40–125)
LIPID PNL WITH DIRECT LDL SERPL: 72 MG/DL — SIGNIFICANT CHANGE UP
LITHIUM SERPL-MCNC: 1.02 MMOL/L — SIGNIFICANT CHANGE UP (ref 0.6–1.2)
POTASSIUM SERPL-MCNC: 3.9 MMOL/L — SIGNIFICANT CHANGE UP (ref 3.5–5.3)
POTASSIUM SERPL-SCNC: 3.9 MMOL/L — SIGNIFICANT CHANGE UP (ref 3.5–5.3)
PROT SERPL-MCNC: 6.2 G/DL — SIGNIFICANT CHANGE UP (ref 6–8.3)
SODIUM SERPL-SCNC: 138 MMOL/L — SIGNIFICANT CHANGE UP (ref 135–145)
TOTAL CHOLESTEROL/HDL RATIO MEASUREMENT: 3 RATIO — LOW (ref 3.4–9.6)
TRIGL SERPL-MCNC: 80 MG/DL — SIGNIFICANT CHANGE UP (ref 10–149)

## 2018-01-31 PROCEDURE — 99231 SBSQ HOSP IP/OBS SF/LOW 25: CPT

## 2018-01-31 RX ORDER — LITHIUM CARBONATE 300 MG/1
450 TABLET, EXTENDED RELEASE ORAL
Qty: 0 | Refills: 0 | Status: DISCONTINUED | OUTPATIENT
Start: 2018-01-31 | End: 2018-02-05

## 2018-01-31 RX ADMIN — Medication 150 MILLIGRAM(S): at 10:01

## 2018-01-31 RX ADMIN — FLUPHENAZINE HYDROCHLORIDE 5 MILLIGRAM(S): 1 TABLET, FILM COATED ORAL at 10:02

## 2018-01-31 RX ADMIN — LITHIUM CARBONATE 450 MILLIGRAM(S): 300 TABLET, EXTENDED RELEASE ORAL at 21:26

## 2018-01-31 RX ADMIN — Medication 1 MILLIGRAM(S): at 21:26

## 2018-01-31 RX ADMIN — FLUPHENAZINE HYDROCHLORIDE 10 MILLIGRAM(S): 1 TABLET, FILM COATED ORAL at 21:25

## 2018-01-31 RX ADMIN — LITHIUM CARBONATE 600 MILLIGRAM(S): 300 TABLET, EXTENDED RELEASE ORAL at 10:02

## 2018-01-31 RX ADMIN — Medication 100 MILLIGRAM(S): at 21:25

## 2018-01-31 RX ADMIN — Medication 1 MILLIGRAM(S): at 10:02

## 2018-01-31 NOTE — PROGRESS NOTE BEHAVIORAL HEALTH - PROBLEM SELECTOR PLAN 1
C/w home psychiatric regimen  - C/w Prolixin to 5 mg daily / 10 mg qhs and Cogentin to 1mg BID  - c/w Effexor 150 mg daily  - c/w Lithium 600 mg BID  - c/w Klonopin 1 mg qhs

## 2018-01-31 NOTE — PROGRESS NOTE BEHAVIORAL HEALTH - NSBHFUPINTERVALHXFT_PSY_A_CORE
Patient seen and examined. Case discussed with treatment team. Per nursing report, patient has appeared irritable and somewhat paranoid. His mother and wife visited yesterday.   [place]  ID [id number] used on evaluation   On evaluation, patient denies any current depression or anxiety, and he reports "I think I am ready to leave for home." Patient denies any SI/HI/AH/VH. Reports sleeping 4 hours last night, which is better than previously, but he is still somewhat tired. He reports chronic low energy and reports that he is not attending groups, even the art and music groups, because he does not have the energy or strength to think so much, especially given the language barrier.  Patient continues to have persistent paranoid delusions regarding his father being alive and being involved in [employer].  Option of MEEK Prolixin discussed with patient, and he refused, stating he prefers to take the tablets.     As per MS3 eval, conducted in Greek:  Patient seen and examined. No acute events overnight. Patient remains paranoid and isolative. Continues to have delusions about working for [employer]. Believes that his father, who recently passed away is still alive. No changes in appetite or sleep disturbances. No SI/HI, no AVH.   As per his mother, who visited him yesterday, she notes significant improvement from his previous state. However, he still refused to sit with his family for a long time. Remained isolative and is not currently at his baseline.

## 2018-01-31 NOTE — PROGRESS NOTE BEHAVIORAL HEALTH - SUMMARY
52-year-old [place]-born male with long hx of schizoaffective disorder, bipolar type, at least 3 prior inpt psychiatric admits, reportedly compliant with and stable for years on his medication regimen, presents acutely psychotic with paranoid ideation and delusional thought content likely triggered by the recent death of his father in December. Treated on medical floor for rhabdomyolysis, now cleared by medicine but with persistent paranoia. Patient currently in denial of the death of his father and continues to believe he was told by [employer] to leave his home and was being chased by the police. Has been restarted on his longstanding home regimen of Prolixin/Cogentin, Effexor, Lithium, and Klonopin. Prolixin had recently been switched by his outpatient psychiatrist 3 months ago to Risperdal but has been restarted on Prolixin on this hospitalization given long history of stabilization on this medicine. Prolixin has been titrated up to 5 mg daily/ 10 mg qhs given persistent paranoia. Patient with no evidence of current mood symptoms or SI/HI but requires inpatient hospitalization for stabilization of ongoing acute psychosis and for discharge planning.

## 2018-02-01 PROCEDURE — 99231 SBSQ HOSP IP/OBS SF/LOW 25: CPT

## 2018-02-01 RX ADMIN — FLUPHENAZINE HYDROCHLORIDE 5 MILLIGRAM(S): 1 TABLET, FILM COATED ORAL at 10:34

## 2018-02-01 RX ADMIN — Medication 150 MILLIGRAM(S): at 10:33

## 2018-02-01 RX ADMIN — LITHIUM CARBONATE 450 MILLIGRAM(S): 300 TABLET, EXTENDED RELEASE ORAL at 21:15

## 2018-02-01 RX ADMIN — Medication 1 MILLIGRAM(S): at 21:15

## 2018-02-01 RX ADMIN — LITHIUM CARBONATE 450 MILLIGRAM(S): 300 TABLET, EXTENDED RELEASE ORAL at 10:34

## 2018-02-01 RX ADMIN — FLUPHENAZINE HYDROCHLORIDE 10 MILLIGRAM(S): 1 TABLET, FILM COATED ORAL at 21:15

## 2018-02-01 RX ADMIN — Medication 100 MILLIGRAM(S): at 21:15

## 2018-02-01 RX ADMIN — Medication 1 MILLIGRAM(S): at 10:34

## 2018-02-01 NOTE — PROGRESS NOTE BEHAVIORAL HEALTH - NSBHFUPINTERVALHXFT_PSY_A_CORE
Patient seen and examined. Case discussed with treatment team. Per nursing report, patient seems to be improving on the unit. Appears less paranoid, less irritable.     Georgian  used on evaluation from treatment team - Joann (MS4)   On evaluation, patient reports improving mood. Feels "much better" than initial presentation. Feels his thoughts are more clear, he is less suspicious of others, feels less irritable and paranoid. He has been taking care of himself, maintaining ADLs. Is looking forward to discharge. However, is still chronically delusional, but his ability to care of self is less impacted. Denies depressed mood, SI, HI, thoughts of self harm.

## 2018-02-01 NOTE — PROGRESS NOTE BEHAVIORAL HEALTH - SUMMARY
52-year-old [place]-born male with long hx of schizoaffective disorder, bipolar type, at least 3 prior inpt psychiatric admits, reportedly compliant with and stable for years on his medication regimen, presents acutely psychotic with paranoid ideation and delusional thought content likely triggered by the recent death of his father in December. Treated on medical floor for rhabdomyolysis, now cleared by medicine but with persistent paranoia. Patient currently in denial of the death of his father and continues to believe he was told by [employer] to leave his home and was being chased by the police. Has been restarted on his longstanding home regimen of Prolixin/Cogentin, Effexor, Lithium, and Klonopin. Prolixin had recently been switched by his outpatient psychiatrist 3 months ago to Risperdal but has been restarted on Prolixin on this hospitalization given long history of stabilization on this medicine. Prolixin has been titrated up to 5 mg daily/ 10 mg qhs given persistent paranoia. Patient with no evidence of current mood symptoms or SI/HI but requires inpatient hospitalization for stabilization of ongoing acute psychosis and for discharge planning.

## 2018-02-02 PROCEDURE — 99231 SBSQ HOSP IP/OBS SF/LOW 25: CPT

## 2018-02-02 RX ADMIN — LITHIUM CARBONATE 450 MILLIGRAM(S): 300 TABLET, EXTENDED RELEASE ORAL at 21:09

## 2018-02-02 RX ADMIN — Medication 1 MILLIGRAM(S): at 09:48

## 2018-02-02 RX ADMIN — LITHIUM CARBONATE 450 MILLIGRAM(S): 300 TABLET, EXTENDED RELEASE ORAL at 09:48

## 2018-02-02 RX ADMIN — Medication 100 MILLIGRAM(S): at 21:10

## 2018-02-02 RX ADMIN — Medication 1 MILLIGRAM(S): at 21:10

## 2018-02-02 RX ADMIN — FLUPHENAZINE HYDROCHLORIDE 5 MILLIGRAM(S): 1 TABLET, FILM COATED ORAL at 09:48

## 2018-02-02 RX ADMIN — FLUPHENAZINE HYDROCHLORIDE 10 MILLIGRAM(S): 1 TABLET, FILM COATED ORAL at 21:10

## 2018-02-02 RX ADMIN — Medication 150 MILLIGRAM(S): at 09:48

## 2018-02-02 NOTE — PROGRESS NOTE BEHAVIORAL HEALTH - SUMMARY
52-year-old [place]-born male with long hx of schizoaffective disorder, bipolar type, at least 3 prior inpt psychiatric admits, reportedly compliant with and stable for years on his medication regimen, presents acutely psychotic with paranoid ideation and delusional thought content likely triggered by the recent death of his father in December. Treated on medical floor for rhabdomyolysis, now cleared by medicine but with persistent paranoia. Patient currently in denial of the death of his father and continues to believe he was told by [employer] to leave his home and was being chased by the police. Has been restarted on his longstanding home regimen of Prolixin/Cogentin, Effexor, Lithium, and Klonopin. Prolixin had recently been switched by his outpatient psychiatrist 3 months ago to Risperdal but has been restarted on Prolixin on this hospitalization given long history of stabilization on this medicine. Prolixin has been titrated up to 5 mg daily/ 10 mg qhs given persistent paranoia. Patient with no evidence of current mood symptoms or SI/HI but requires inpatient hospitalization for stabilization of ongoing acute psychosis and for discharge planning.

## 2018-02-03 RX ADMIN — FLUPHENAZINE HYDROCHLORIDE 5 MILLIGRAM(S): 1 TABLET, FILM COATED ORAL at 10:22

## 2018-02-03 RX ADMIN — LITHIUM CARBONATE 450 MILLIGRAM(S): 300 TABLET, EXTENDED RELEASE ORAL at 21:52

## 2018-02-03 RX ADMIN — Medication 1 MILLIGRAM(S): at 21:52

## 2018-02-03 RX ADMIN — Medication 150 MILLIGRAM(S): at 10:22

## 2018-02-03 RX ADMIN — Medication 100 MILLIGRAM(S): at 21:52

## 2018-02-03 RX ADMIN — FLUPHENAZINE HYDROCHLORIDE 10 MILLIGRAM(S): 1 TABLET, FILM COATED ORAL at 21:52

## 2018-02-03 RX ADMIN — Medication 1 MILLIGRAM(S): at 10:22

## 2018-02-03 RX ADMIN — LITHIUM CARBONATE 450 MILLIGRAM(S): 300 TABLET, EXTENDED RELEASE ORAL at 10:22

## 2018-02-04 RX ADMIN — Medication 1 MILLIGRAM(S): at 21:20

## 2018-02-04 RX ADMIN — LITHIUM CARBONATE 450 MILLIGRAM(S): 300 TABLET, EXTENDED RELEASE ORAL at 21:21

## 2018-02-04 RX ADMIN — Medication 150 MILLIGRAM(S): at 10:16

## 2018-02-04 RX ADMIN — Medication 1 MILLIGRAM(S): at 10:16

## 2018-02-04 RX ADMIN — LITHIUM CARBONATE 450 MILLIGRAM(S): 300 TABLET, EXTENDED RELEASE ORAL at 10:17

## 2018-02-04 RX ADMIN — FLUPHENAZINE HYDROCHLORIDE 5 MILLIGRAM(S): 1 TABLET, FILM COATED ORAL at 10:17

## 2018-02-04 RX ADMIN — FLUPHENAZINE HYDROCHLORIDE 10 MILLIGRAM(S): 1 TABLET, FILM COATED ORAL at 21:20

## 2018-02-04 RX ADMIN — Medication 100 MILLIGRAM(S): at 21:20

## 2018-02-04 NOTE — PROGRESS NOTE BEHAVIORAL HEALTH - SUMMARY
52-year-old Kitt[place]-born male with long hx of schizoaffective disorder, bipolar type, at least 3 prior inpt psychiatric admits, reportedly compliant with medication regimen, found wandering on the street, brought in by EMS. Mother reportedly called police to report pt missing.  Pt lives at home, is reportedly very compliant with his rx regimen.   Pt currently confused, paranoid. Currently had increased CPK and WBC, which may be contributing to AMS.  Likely trigger is recent death of father.    Pt requiring acute medical admission.    For now, will hold all psychotropic medications, give prn prolixin 5 mg and ativan 1 mg.   C.O. for safety.  Pt will be seen over the weekend by psychiatry M.D. on call.  If pt remains with psychosis once medically improved/stabilized, will likely need acute inpt psych admit.

## 2018-02-05 VITALS
DIASTOLIC BLOOD PRESSURE: 88 MMHG | SYSTOLIC BLOOD PRESSURE: 145 MMHG | RESPIRATION RATE: 20 BRPM | TEMPERATURE: 98 F | HEART RATE: 84 BPM

## 2018-02-05 LAB
ALBUMIN SERPL ELPH-MCNC: 3.7 G/DL — SIGNIFICANT CHANGE UP (ref 3.3–5)
ALP SERPL-CCNC: 74 U/L — SIGNIFICANT CHANGE UP (ref 40–120)
ALT FLD-CCNC: 22 U/L — SIGNIFICANT CHANGE UP (ref 10–45)
ANION GAP SERPL CALC-SCNC: 11 MMOL/L — SIGNIFICANT CHANGE UP (ref 5–17)
AST SERPL-CCNC: 16 U/L — SIGNIFICANT CHANGE UP (ref 10–40)
BILIRUB SERPL-MCNC: 0.2 MG/DL — SIGNIFICANT CHANGE UP (ref 0.2–1.2)
BUN SERPL-MCNC: 20 MG/DL — SIGNIFICANT CHANGE UP (ref 7–23)
CALCIUM SERPL-MCNC: 9.4 MG/DL — SIGNIFICANT CHANGE UP (ref 8.4–10.5)
CHLORIDE SERPL-SCNC: 100 MMOL/L — SIGNIFICANT CHANGE UP (ref 96–108)
CO2 SERPL-SCNC: 27 MMOL/L — SIGNIFICANT CHANGE UP (ref 22–31)
CREAT SERPL-MCNC: 0.78 MG/DL — SIGNIFICANT CHANGE UP (ref 0.5–1.3)
GLUCOSE SERPL-MCNC: 117 MG/DL — HIGH (ref 70–99)
LITHIUM SERPL-MCNC: 0.57 MMOL/L — LOW (ref 0.6–1.2)
POTASSIUM SERPL-MCNC: 4.1 MMOL/L — SIGNIFICANT CHANGE UP (ref 3.5–5.3)
POTASSIUM SERPL-SCNC: 4.1 MMOL/L — SIGNIFICANT CHANGE UP (ref 3.5–5.3)
PROT SERPL-MCNC: 6.4 G/DL — SIGNIFICANT CHANGE UP (ref 6–8.3)
SODIUM SERPL-SCNC: 138 MMOL/L — SIGNIFICANT CHANGE UP (ref 135–145)

## 2018-02-05 PROCEDURE — 80053 COMPREHEN METABOLIC PANEL: CPT

## 2018-02-05 PROCEDURE — 93005 ELECTROCARDIOGRAM TRACING: CPT

## 2018-02-05 PROCEDURE — 80061 LIPID PANEL: CPT

## 2018-02-05 PROCEDURE — 82550 ASSAY OF CK (CPK): CPT

## 2018-02-05 PROCEDURE — 83036 HEMOGLOBIN GLYCOSYLATED A1C: CPT

## 2018-02-05 PROCEDURE — 99238 HOSP IP/OBS DSCHRG MGMT 30/<: CPT

## 2018-02-05 PROCEDURE — 36415 COLL VENOUS BLD VENIPUNCTURE: CPT

## 2018-02-05 PROCEDURE — 80178 ASSAY OF LITHIUM: CPT

## 2018-02-05 RX ORDER — TRAZODONE HCL 50 MG
1 TABLET ORAL
Qty: 14 | Refills: 0 | OUTPATIENT
Start: 2018-02-05 | End: 2018-02-18

## 2018-02-05 RX ORDER — FLUPHENAZINE HYDROCHLORIDE 1 MG/1
1 TABLET, FILM COATED ORAL
Qty: 14 | Refills: 0 | OUTPATIENT
Start: 2018-02-05 | End: 2018-02-18

## 2018-02-05 RX ORDER — CLONAZEPAM 1 MG
1 TABLET ORAL
Qty: 14 | Refills: 0 | OUTPATIENT
Start: 2018-02-05 | End: 2018-02-18

## 2018-02-05 RX ORDER — BENZTROPINE MESYLATE 1 MG
1 TABLET ORAL
Qty: 14 | Refills: 0 | OUTPATIENT
Start: 2018-02-05 | End: 2018-02-18

## 2018-02-05 RX ORDER — LITHIUM CARBONATE 300 MG/1
1 TABLET, EXTENDED RELEASE ORAL
Qty: 14 | Refills: 0 | OUTPATIENT
Start: 2018-02-05 | End: 2018-02-18

## 2018-02-05 RX ORDER — FLUPHENAZINE HYDROCHLORIDE 1 MG/1
1 TABLET, FILM COATED ORAL
Qty: 0 | Refills: 0 | COMMUNITY
Start: 2018-02-05

## 2018-02-05 RX ORDER — VENLAFAXINE HCL 75 MG
1 CAPSULE, EXT RELEASE 24 HR ORAL
Qty: 14 | Refills: 0 | OUTPATIENT
Start: 2018-02-05 | End: 2018-02-18

## 2018-02-05 RX ORDER — LITHIUM CARBONATE 300 MG/1
2 TABLET, EXTENDED RELEASE ORAL
Qty: 28 | Refills: 0 | OUTPATIENT
Start: 2018-02-05 | End: 2018-02-18

## 2018-02-05 RX ADMIN — Medication 1 MILLIGRAM(S): at 10:12

## 2018-02-05 RX ADMIN — LITHIUM CARBONATE 450 MILLIGRAM(S): 300 TABLET, EXTENDED RELEASE ORAL at 10:12

## 2018-02-05 RX ADMIN — Medication 150 MILLIGRAM(S): at 10:12

## 2018-02-05 RX ADMIN — FLUPHENAZINE HYDROCHLORIDE 5 MILLIGRAM(S): 1 TABLET, FILM COATED ORAL at 10:12

## 2018-02-05 NOTE — PROGRESS NOTE BEHAVIORAL HEALTH - NSBHADMITIPOBSFT_PSY_A_CORE
able to make needs known; no current SI
See Risk
able to make needs known; no current SI
stable, no acute SI
able to make needs known; no current SI
able to make needs known; no current SI

## 2018-02-05 NOTE — PROGRESS NOTE BEHAVIORAL HEALTH - NSBHPTASSESSDT_PSY_A_CORE
01-Feb-2018 08:31
02-Feb-2018 08:53
26-Jan-2018 08:47
28-Jan-2018 11:30
29-Jan-2018 11:34
31-Jan-2018 08:56
30-Jan-2018 08:54
04-Feb-2018 08:34
25-Jan-2018 08:44
05-Feb-2018 11:11

## 2018-02-05 NOTE — PROGRESS NOTE BEHAVIORAL HEALTH - ADDITIONAL DETAILS / COMMENTS
Continuing paranoid ideation re: [employer] and delusional thoughts that father is alive.  Somewhat oddly related.   No current evidence of mood disturbance or affective instability.
Continuing paranoid ideation re: [employer] and delusional thoughts that father is alive.  Somewhat oddly related.   No current evidence of mood disturbance or instability.
Continuing paranoid ideation re: [employer] and delusional thoughts that father is alive.  Somewhat oddly related.   No current evidence of mood disturbance or affective instability.

## 2018-02-05 NOTE — PROGRESS NOTE BEHAVIORAL HEALTH - NSBHADMITIPDSM_PSY_A_CORE
see above for Axis I, II, III

## 2018-02-05 NOTE — PROGRESS NOTE BEHAVIORAL HEALTH - NSBHFUPINTERVALHXFT_PSY_A_CORE
[place]  - Joann Maricruzangelaromi - MS4    Patient seen and examined. No acute events overnight. As per nurse, remains somewhat isolative. Refused to use the  phone over the weekend. Has remained in good behavioral control.    Upon examination, patient is ready and willing to go home. States he feels significantly better than when first admitted in regards to his mood and his paranoia. Feels he is less anxious. Motivated to be compliant with his medications. He would like to make sure that he has prescriptions ready at his pharmacy. No complaints at this time. Denies SI/HI, AVH, overt paranoia. Reports marked improvement from his condition on admission. Citizen of [place]  #[id number]    Patient seen and examined. No acute events overnight. As per nurse, remains somewhat isolative. Refused to use the  phone over the weekend. Has remained in good behavioral control.    Upon examination, patient is ready and willing to go home. States he feels significantly better than when first admitted in regards to his mood and his paranoia. Feels he is less anxious. Motivated to be compliant with his medications. He would like to make sure that he has prescriptions ready at his pharmacy. No complaints at this time. Denies SI/HI, AVH, overt paranoia. Reports marked improvement from his condition on admission.    Note: Lithium level 02/05 (0.57), previous level on 1/31 (1.02)  Patient was taking 600mg BID but dose was reduced to 450mg BID due to concern for supra therapeutic level (1.02). However, since decrease in dose to 450mg BID (on 1/31), patient's lithium level has decreased (0.57). However, patient remains stable with no s/s of toxicity. Therefore, will discharge patient on Lithium 450mg / 600mg to follow up with outpatient clinic and titrate as necessary. [place]  #[id number]    Patient seen and examined. No acute events overnight. As per nurse, remains somewhat isolative. Refused to use the  phone over the weekend. Has remained in good behavioral control.    Upon examination, patient is ready and willing to go home. States he feels significantly better than when first admitted in regards to his mood and his paranoia. Feels he is less anxious. Motivated to be compliant with his medications. He would like to make sure that he has prescriptions ready at his pharmacy. No complaints at this time. Denies SI/HI, AH/VH, overt paranoia. Reports marked improvement from his condition on admission.    Note: Lithium level 02/05 (0.57), previous level on 1/31 (1.02)  Patient was taking 600mg BID but dose was reduced to 450mg BID due to concern for supra therapeutic level (1.02). However, since decrease in dose to 450mg BID (on 1/31), patient's lithium level has decreased (0.57). However, patient remains stable with no s/s of toxicity. Therefore, will discharge patient on Lithium 450mg / 600mg to follow up with outpatient clinic and titrate as necessary.

## 2018-02-05 NOTE — PROGRESS NOTE BEHAVIORAL HEALTH - NS ED BHA REVIEW OF ED CHART AVAILABLE INVESTIGATIONS REVIEWED
None available
Yes
None available

## 2018-02-05 NOTE — PROGRESS NOTE BEHAVIORAL HEALTH - NSBHCHARTREVIEWLAB_PSY_A_CORE FT
Utox negative on admission  Hep panel negative on 01/21/18  CPK and liver enzymes downtrending    01-24    145  |  108  |  20  ----------------------------<  125<H>  3.7   |  26  |  0.88    Ca    9.4      24 Jan 2018 07:32    TPro  6.4  /  Alb  3.5  /  TBili  0.2  /  DBili  x   /  AST  127<H>  /  ALT  203<H>  /  AlkPhos  69  01-24
CPK 1839 continues to fall.  Plan to recheck
Utox negative on admission  Hep panel negative on 01/21/18  CPK and liver enzymes downtrending    01-24    145  |  108  |  20  ----------------------------<  125<H>  3.7   |  26  |  0.88    Ca    9.4      24 Jan 2018 07:32    TPro  6.4  /  Alb  3.5  /  TBili  0.2  /  DBili  x   /  AST  127<H>  /  ALT  203<H>  /  AlkPhos  69  01-24
Utox negative on admission  Hep panel negative on 01/21/18  CPK and liver enzymes downtrending    01-24    145  |  108  |  20  ----------------------------<  125<H>  3.7   |  26  |  0.88    Ca    9.4      24 Jan 2018 07:32    TPro  6.4  /  Alb  3.5  /  TBili  0.2  /  DBili  x   /  AST  127<H>  /  ALT  203<H>  /  AlkPhos  69  01-24

## 2018-02-05 NOTE — PROGRESS NOTE BEHAVIORAL HEALTH - NSBHCHARTREVIEWIMAGING_PSY_A_CORE FT
< from: CT Head No Cont (01.19.18 @ 15:24) >    INTERPRETATION:  -  CT OF THE HEAD:    Clinical information:  51 y/o male,  AMS.     Multiple axial scans of the head were obtained without intravenous   contrast medium administration. Sagittal and coronal reformatted images   were created.    A extracerebral CSF collection is noted in the left anterior temporal   region measuring 1.5 x 3.5 cm in size. There is minimal mass effect with   effacement of the adjacent cerebral sulci. The findings are indicative of   an arachnoid cyst.    The ventricles and other subarachnoid spaces are minimally dilated.  No   intracranial hemorrhage is seen.  There is no abnormal density in the   brain parenchyma to indicate an infarct.    The bony structures are intact.   Both mastoid bones are well pneumatized   and appear normal.  The sella turcica is normal in size.  Both orbits   appear normal.  The visualized paranasal sinuses are clear.    IMPRESSION:-    1.  No intracranial hemorrhage or infarct..    2.  An arachnoid cyst in the left anterior temporal fossa.     END OF IMPRESSION.    < end of copied text >
< from: CT Head No Cont (01.19.18 @ 15:24) >    INTERPRETATION:  -  CT OF THE HEAD:    Clinical information:  53 y/o male,  AMS.     Multiple axial scans of the head were obtained without intravenous   contrast medium administration. Sagittal and coronal reformatted images   were created.    A extracerebral CSF collection is noted in the left anterior temporal   region measuring 1.5 x 3.5 cm in size. There is minimal mass effect with   effacement of the adjacent cerebral sulci. The findings are indicative of   an arachnoid cyst.    The ventricles and other subarachnoid spaces are minimally dilated.  No   intracranial hemorrhage is seen.  There is no abnormal density in the   brain parenchyma to indicate an infarct.    The bony structures are intact.   Both mastoid bones are well pneumatized   and appear normal.  The sella turcica is normal in size.  Both orbits   appear normal.  The visualized paranasal sinuses are clear.    IMPRESSION:-    1.  No intracranial hemorrhage or infarct..    2.  An arachnoid cyst in the left anterior temporal fossa.     END OF IMPRESSION.    < end of copied text >
< from: CT Head No Cont (01.19.18 @ 15:24) >    INTERPRETATION:  -  CT OF THE HEAD:    Clinical information:  51 y/o male,  AMS.     Multiple axial scans of the head were obtained without intravenous   contrast medium administration. Sagittal and coronal reformatted images   were created.    A extracerebral CSF collection is noted in the left anterior temporal   region measuring 1.5 x 3.5 cm in size. There is minimal mass effect with   effacement of the adjacent cerebral sulci. The findings are indicative of   an arachnoid cyst.    The ventricles and other subarachnoid spaces are minimally dilated.  No   intracranial hemorrhage is seen.  There is no abnormal density in the   brain parenchyma to indicate an infarct.    The bony structures are intact.   Both mastoid bones are well pneumatized   and appear normal.  The sella turcica is normal in size.  Both orbits   appear normal.  The visualized paranasal sinuses are clear.    IMPRESSION:-    1.  No intracranial hemorrhage or infarct..    2.  An arachnoid cyst in the left anterior temporal fossa.     END OF IMPRESSION.    < end of copied text >
< from: CT Head No Cont (01.19.18 @ 15:24) >    INTERPRETATION:  -  CT OF THE HEAD:    Clinical information:  53 y/o male,  AMS.     Multiple axial scans of the head were obtained without intravenous   contrast medium administration. Sagittal and coronal reformatted images   were created.    A extracerebral CSF collection is noted in the left anterior temporal   region measuring 1.5 x 3.5 cm in size. There is minimal mass effect with   effacement of the adjacent cerebral sulci. The findings are indicative of   an arachnoid cyst.    The ventricles and other subarachnoid spaces are minimally dilated.  No   intracranial hemorrhage is seen.  There is no abnormal density in the   brain parenchyma to indicate an infarct.    The bony structures are intact.   Both mastoid bones are well pneumatized   and appear normal.  The sella turcica is normal in size.  Both orbits   appear normal.  The visualized paranasal sinuses are clear.    IMPRESSION:-    1.  No intracranial hemorrhage or infarct..    2.  An arachnoid cyst in the left anterior temporal fossa.     END OF IMPRESSION.    < end of copied text >
< from: CT Head No Cont (01.19.18 @ 15:24) >    INTERPRETATION:  -  CT OF THE HEAD:    Clinical information:  53 y/o male,  AMS.     Multiple axial scans of the head were obtained without intravenous   contrast medium administration. Sagittal and coronal reformatted images   were created.    A extracerebral CSF collection is noted in the left anterior temporal   region measuring 1.5 x 3.5 cm in size. There is minimal mass effect with   effacement of the adjacent cerebral sulci. The findings are indicative of   an arachnoid cyst.    The ventricles and other subarachnoid spaces are minimally dilated.  No   intracranial hemorrhage is seen.  There is no abnormal density in the   brain parenchyma to indicate an infarct.    The bony structures are intact.   Both mastoid bones are well pneumatized   and appear normal.  The sella turcica is normal in size.  Both orbits   appear normal.  The visualized paranasal sinuses are clear.    IMPRESSION:-    1.  No intracranial hemorrhage or infarct..    2.  An arachnoid cyst in the left anterior temporal fossa.     END OF IMPRESSION.    < end of copied text >

## 2018-02-05 NOTE — PROGRESS NOTE BEHAVIORAL HEALTH - PRIMARY DX
Schizoaffective disorder, bipolar type

## 2018-02-05 NOTE — PROGRESS NOTE BEHAVIORAL HEALTH - RISK ASSESSMENT
Pt with no evidence of suicidal or homicidal ideation, intent or plan.  However, due to acute psychosis w/ paranoia, is currently unable to care for self.    Chronic: longstanding history of schizoaffective disorder; distant suicide attempt (age 18)  Modifiable: acute impact of recent death of father; paranoid ideation and delusional thoughts  Protective: no history of substance abuse; no recent suicide attempt; supportive family; stable residence Chronic: longstanding history of schizoaffective disorder; distant suicide attempt (age 18)  Modifiable: acute impact of recent death of father; paranoid ideation and delusional thoughts (adddressed during the hospitalization)  Protective: no history of substance abuse; no recent suicide attempt; supportive family; stable residence  At this time, patient with no evidence of suicidal or homicidal ideation, intent or plan.  Although pt has evidnece of delusions at times, they are not interfering with his ability to function.  He is caring for ADLs and feels safe returnign to the community with active social supports in place.  Stable for discharge today with outpt follow up.

## 2018-02-05 NOTE — PROGRESS NOTE BEHAVIORAL HEALTH - NS ED BHA MSE GENERAL APPEARANCE
Well developed

## 2018-02-05 NOTE — PROGRESS NOTE BEHAVIORAL HEALTH - LANGUAGE
No abnormalities noted

## 2018-02-05 NOTE — PROGRESS NOTE BEHAVIORAL HEALTH - SUMMARY
52-year-old [place]-born male with long hx of schizoaffective disorder, bipolar type, at least 3 prior inpt psychiatric admits, reportedly compliant with and stable for years on his medication regimen, presents acutely psychotic with paranoid ideation and delusional thought content likely triggered by the recent death of his father in December. Treated on medical floor for rhabdomyolysis, now cleared by medicine but with persistent paranoia. Patient currently in denial of the death of his father and continues to believe he was told by [employer] to leave his home and was being chased by the police. Has been restarted on his longstanding home regimen of Prolixin/Cogentin, Effexor, Lithium, and Klonopin. Prolixin had recently been switched by his outpatient psychiatrist 3 months ago to Risperdal but has been restarted on Prolixin on this hospitalization given long history of stabilization on this medicine. Prolixin has been titrated up to 5 mg daily/ 10 mg qhs given persistent paranoia. Patient with no evidence of current mood symptoms or SI/HI.

## 2018-02-05 NOTE — PROGRESS NOTE BEHAVIORAL HEALTH - PROBLEM SELECTOR PROBLEM 1
Schizoaffective disorder, bipolar type

## 2018-02-05 NOTE — PROGRESS NOTE BEHAVIORAL HEALTH - NSBHADMITDANGERSELF_PSY_A_CORE
unable to care for self
unable to care for self/2/2 paranoid ideation and acute psychosis
2/2 paranoid ideation and acute psychosis/unable to care for self
unable to care for self
2/2 paranoid ideation and acute psychosis/unable to care for self
unable to care for self/2/2 paranoid ideation and acute psychosis
2/2 paranoid ideation and acute psychosis/unable to care for self

## 2018-02-05 NOTE — PROGRESS NOTE BEHAVIORAL HEALTH - NSBHADMITIPOBS_PSY_A_CORE
Routine observation

## 2018-02-05 NOTE — PROGRESS NOTE BEHAVIORAL HEALTH - NSBHFUPVIOL_PSY_A_CORE
none known
unable to assess
none known

## 2018-02-05 NOTE — PROGRESS NOTE BEHAVIORAL HEALTH - NSBHADMITIPREASON_PSY_A_CORE
Danger to self; mental illness expected to respond to inpatient care

## 2018-02-05 NOTE — PROGRESS NOTE BEHAVIORAL HEALTH - NSBHCONSORIP_PSY_A_CORE
Inpatient Admission...

## 2018-02-05 NOTE — PROGRESS NOTE BEHAVIORAL HEALTH - NSBHADMITCOORDCAREOTHER_PSY_A_CORE FT
creative art and psychology staff.

## 2018-02-05 NOTE — PROGRESS NOTE BEHAVIORAL HEALTH - NSBHLEGALSTATUS_PSY_A_CORE
9.13 (Voluntary)

## 2018-02-05 NOTE — PROGRESS NOTE BEHAVIORAL HEALTH - BODY HABITUS
Well nourished

## 2018-02-05 NOTE — PROGRESS NOTE BEHAVIORAL HEALTH - NSBHATTESTSEENBY_PSY_A_CORE
Attending Psychiatrist supervising NP/Trainee, meeting pt...
Attending Psychiatrist supervising NP/Trainee, meeting pt...
Trainee with telephonic supervision from Attending Psychiatrist
Attending Psychiatrist supervising NP/Trainee, meeting pt...
attending Psychiatrist without NP/Trainee

## 2018-02-05 NOTE — PROGRESS NOTE BEHAVIORAL HEALTH - NSBHADMITCOUNSEL_PSY_A_CORE
client/family/caregiver education/risks and benefits of treatment options/instructions for management, treatment and follow up/risk factor reduction/diagnostic results/impressions and/or recommended studies
diagnostic results/impressions and/or recommended studies/risks and benefits of treatment options/client/family/caregiver education/instructions for management, treatment and follow up/risk factor reduction
client/family/caregiver education/diagnostic results/impressions and/or recommended studies/risks and benefits of treatment options/instructions for management, treatment and follow up/risk factor reduction
diagnostic results/impressions and/or recommended studies/risks and benefits of treatment options/instructions for management, treatment and follow up/risk factor reduction/client/family/caregiver education
diagnostic results/impressions and/or recommended studies/instructions for management, treatment and follow up
risk factor reduction/risks and benefits of treatment options/client/family/caregiver education/diagnostic results/impressions and/or recommended studies/instructions for management, treatment and follow up
diagnostic results/impressions and/or recommended studies/instructions for management, treatment and follow up/risk factor reduction/client/family/caregiver education/risks and benefits of treatment options

## 2018-02-05 NOTE — PROGRESS NOTE BEHAVIORAL HEALTH - ABNORMAL MOVEMENTS
No abnormal movements

## 2018-02-05 NOTE — PROGRESS NOTE BEHAVIORAL HEALTH - CASE SUMMARY
see above hx  ;;1/26: interviewed with [place] phone  [phone] [employer].  sleep good awoke from 4 to 5am only ; appetite  good; no pain issues;   No troubling thoughts.   Anticipates going home soon.  Patient had visit with mother and wife which went well.    Speech clear.  denies avh.  denies s/h i/i/p.  cognition intact; anxious and thought congruent.   Discussed safety issues regarding Lithium.  Begin aftercare planning.
see above hx  ;;2/2: interviewed with assistance of  [place]ian  #[id number]  [employer].  Wants to leave today but willing to stay until 2/5 to allow Lithium to continue stabilizing mood ; "I slept seven hours!"  No troubling thoughts but still very guarded about beliefs about father and thought to have some degree of paranoid delusions.  Oriented. Alert.  No tremor observed.  Speech is clear.  no avh.  no s/h i/i/p.   Can transition to aftercare if maintains stability.
see above hx  ;;1/25: interviewed using Palestinian translation [employer]  # [id number]; slept poorly; open to Trazodone; appetite good; no pain issues; formal; polite; superficial; guarded; anxious ; coherent; denies s/h i/i/p or AVH.
see above hx  ;;1/26: interviewed with Citizen of [place] phone  [phone] [employer].  sleep conitnues improved; appetite still good; no pain issues;   Not thinking much about his father at this time.  Anticipates going home soon.  Patient had visit with mother and wife which went well.    Speech clear.  denies avh.  denies s/h i/i/p.  cognition intact; anxious and thought congruent.  Lithium Level, Serum (01.31.18 @ 06:54)    Lithium Level, Serum: 1.02 mmoL/L  (Therapeutic; might cut back a small amount for safety margin) ; Creatine Kinase, Serum in AM (01.31.18 @ 06:54)    Creatine Kinase, Serum: 35 U/L (normal)
see above hx  ;;1/26: interviewed with [place] phone  [place] ID [id number] [employer].  sleep improved; appetite good; no pain issues; very and intentionally guarded about all discussion about his father;  states that his father went into [facility] for a hernia repair and has not gone out.  Continues to state he worked for [employer].  Allows discussion with family.  Gait wnl; no tremor; good but intense at times eye contact;  Speech clear.  denies avh.  denies s/h i/i/p.  cognition intact; anxious and thought congruent.
The pt refused to speak with me. The pt is refusing the  phone or live translation. I reviewed the case with the residents.    The pt has a fixed paranoid delusion but also is recently bereaved; he lost his father last month ( and became very agitated at the  and later he left home, reportedly walking from [place] to [facility].
Spoke with pt using  #[id number] ([employer]).    Patient states that he had a good weekend and is feeling well.  He is looking forward to discharge today. Guarded re: delusions, denies paranoia.  Compliant with meds, denies side effects.  Denies SI/HI/AH/VH.  Future-oriented.  Good ADL's.  Euthymic affect.  Speech is clear. Will discharge pt on li 450mg qam/600mg qhs given pt's li level on 600mg bid and on 450mg BID.  This will put pts li level between 0.57 and 1.02.  Pt educated on this and agreeable to this plan with continued outpt follow up with oupt psychiatrist. RBA discussed with pt. Overall, pt's acute risk has been mitigated and pt is at low acute risk of harm to self or others at this time; stable for dishcarge home with outpt follow up. Pt's mother notes improvement and feels comfortable taking the pt home today as well.  Discharge today.

## 2018-02-05 NOTE — PROGRESS NOTE BEHAVIORAL HEALTH - MODIFICATIONS
continue regime of Lithium; Klonopin; Prolixin; Trazodone; make Trazodone standing; taper off of Effexor in view of paranoid thinking and insomnia.
review Lithium level on 2/5 and if possible meet with family prior to d/c; continue current treatments and encourage patient discussion with involved staff members; assessment of coping skills.
continue regime of Lithium; Klonopin; Prolixin; Trazodone; make Trazodone standing; taper off of Effexor in view of paranoid thinking and insomnia.
see below

## 2018-02-05 NOTE — PROGRESS NOTE BEHAVIORAL HEALTH - MUSCLE TONE / STRENGTH
Normal muscle tone/strength

## 2018-02-05 NOTE — PROGRESS NOTE BEHAVIORAL HEALTH - NSBHADMITCOORDWITH_PSY_A_CORE
social work/medical staff/other
medical staff/social work/other
other/medical staff/social work
social work/other/medical staff
medical staff
medical staff/social work/other
other/social work/medical staff

## 2018-02-05 NOTE — PROGRESS NOTE BEHAVIORAL HEALTH - GAIT / STATION
Normal gait / station

## 2018-02-05 NOTE — PROGRESS NOTE BEHAVIORAL HEALTH - NSBHFUPINTERVALCCFT_PSY_A_CORE
"I just need my meds"
"I am good."
"I'm doing fine"
"Today is the first time I have slept well for 11 days."
Interviewed using Faroese translation #[id number] [employer].  Patient slept well. ate well.  had visitors (Mother and wife) and visit went well.  Says he is not as preoccupied with his father as before.  Plans to go home and take his medications.  Polite but otherwise guarded.  Pleasant.
"I'm doing good."
"I'm doing good."
"I want to go home"
"I am good."

## 2018-02-05 NOTE — PROGRESS NOTE BEHAVIORAL HEALTH - AFFECT RANGE
Constricted
Full
Constricted

## 2018-02-05 NOTE — PROGRESS NOTE BEHAVIORAL HEALTH - NSBHCHARTREVIEWVS_PSY_A_CORE FT
Vital Signs Last 24 Hrs  T(C): 36.3 (29 Jan 2018 08:41), Max: 36.9 (28 Jan 2018 17:00)  T(F): 97.3 (29 Jan 2018 08:41), Max: 98.5 (28 Jan 2018 17:00)  HR: 85 (29 Jan 2018 08:41) (75 - 85)  BP: 149/90 (29 Jan 2018 08:41) (126/86 - 149/90)  RR: 20 (29 Jan 2018 08:41) (18 - 20)
VSS
Vital Signs Last 24 Hrs  T(C): 36.1 (02 Feb 2018 08:51), Max: 36.9 (01 Feb 2018 15:57)  T(F): 97 (02 Feb 2018 08:51), Max: 98.4 (01 Feb 2018 15:57)  HR: 83 (02 Feb 2018 08:51) (73 - 83)  BP: 136/85 (02 Feb 2018 08:51) (117/75 - 136/85)  BP(mean): --  RR: 20 (02 Feb 2018 08:51) (20 - 20)  SpO2: --
Vital Signs Last 24 Hrs  T(C): 36.6 (26 Jan 2018 09:15), Max: 36.6 (26 Jan 2018 09:15)  T(F): 97.9 (26 Jan 2018 09:15), Max: 97.9 (26 Jan 2018 09:15)  HR: 70 (26 Jan 2018 09:15) (69 - 70)  BP: 123/73 (26 Jan 2018 09:15) (123/73 - 143/62)  RR: 18 (26 Jan 2018 09:15) (18 - 18)
Vital Signs Last 24 Hrs  T(C): 35.6 (31 Jan 2018 09:30), Max: 36.9 (30 Jan 2018 16:29)  T(F): 96 (31 Jan 2018 09:30), Max: 98.5 (30 Jan 2018 16:29)  HR: 83 (31 Jan 2018 09:30) (83 - 101)  BP: 151/94 (31 Jan 2018 09:30) (151/94 - 157/95)  RR: 18 (31 Jan 2018 09:30) (18 - 18)
Vital Signs Last 24 Hrs  T(C): 36.9 (01 Feb 2018 15:57), Max: 36.9 (31 Jan 2018 17:00)  T(F): 98.4 (01 Feb 2018 15:57), Max: 98.5 (31 Jan 2018 17:00)  HR: 73 (01 Feb 2018 15:57) (68 - 89)  BP: 117/75 (01 Feb 2018 15:57) (117/75 - 148/83)  BP(mean): --  RR: 20 (01 Feb 2018 08:48) (18 - 20)  SpO2: --
Vital Signs Last 24 Hrs  T(C): 36.7 (30 Jan 2018 10:00), Max: 37.2 (29 Jan 2018 17:00)  T(F): 98.1 (30 Jan 2018 10:00), Max: 98.9 (29 Jan 2018 17:00)  HR: 85 (30 Jan 2018 10:00) (80 - 85)  BP: 146/90 (30 Jan 2018 10:00) (127/78 - 146/90)  BP(mean): --  RR: 18 (30 Jan 2018 10:00) (18 - 18)  SpO2: --
Vital Signs Last 24 Hrs  T(C): 36.1 (25 Jan 2018 09:03), Max: 36.9 (24 Jan 2018 17:00)  T(F): 97 (25 Jan 2018 09:03), Max: 98.5 (24 Jan 2018 17:00)  HR: 71 (25 Jan 2018 09:03) (71 - 91)  BP: 159/87 (25 Jan 2018 09:03) (145/91 - 159/87)  RR: 20 (25 Jan 2018 09:03) (18 - 20)
Vital Signs Last 24 Hrs  T(C): 36.9 (05 Feb 2018 09:03), Max: 36.9 (04 Feb 2018 16:19)  T(F): 98.4 (05 Feb 2018 09:03), Max: 98.5 (04 Feb 2018 16:19)  HR: 84 (05 Feb 2018 09:03) (84 - 90)  BP: 145/88 (05 Feb 2018 09:03) (135/87 - 145/88)  BP(mean): --  RR: 20 (05 Feb 2018 09:03) (18 - 20)  SpO2: --

## 2018-02-08 DIAGNOSIS — R74.8 ABNORMAL LEVELS OF OTHER SERUM ENZYMES: ICD-10-CM

## 2018-02-08 DIAGNOSIS — Z81.8 FAMILY HISTORY OF OTHER MENTAL AND BEHAVIORAL DISORDERS: ICD-10-CM

## 2018-02-08 DIAGNOSIS — F25.0 SCHIZOAFFECTIVE DISORDER, BIPOLAR TYPE: ICD-10-CM

## 2018-02-08 DIAGNOSIS — G47.00 INSOMNIA, UNSPECIFIED: ICD-10-CM

## 2018-02-20 ENCOUNTER — INPATIENT (INPATIENT)
Facility: HOSPITAL | Age: 53
LOS: 15 days | Discharge: HOME | DRG: 885 | End: 2018-03-08
Attending: PSYCHIATRY & NEUROLOGY | Admitting: PSYCHIATRY & NEUROLOGY
Payer: MEDICAID

## 2018-02-20 VITALS
TEMPERATURE: 98 F | HEART RATE: 87 BPM | DIASTOLIC BLOOD PRESSURE: 113 MMHG | SYSTOLIC BLOOD PRESSURE: 171 MMHG | OXYGEN SATURATION: 97 % | RESPIRATION RATE: 16 BRPM

## 2018-02-20 DIAGNOSIS — Z98.890 OTHER SPECIFIED POSTPROCEDURAL STATES: Chronic | ICD-10-CM

## 2018-02-20 DIAGNOSIS — F25.0 SCHIZOAFFECTIVE DISORDER, BIPOLAR TYPE: ICD-10-CM

## 2018-02-20 LAB
ALBUMIN SERPL ELPH-MCNC: 4.3 G/DL — SIGNIFICANT CHANGE UP (ref 3.3–5)
ALP SERPL-CCNC: 81 U/L — SIGNIFICANT CHANGE UP (ref 40–120)
ALT FLD-CCNC: 36 U/L — SIGNIFICANT CHANGE UP (ref 10–45)
AMPHET UR-MCNC: NEGATIVE — SIGNIFICANT CHANGE UP
ANION GAP SERPL CALC-SCNC: 12 MMOL/L — SIGNIFICANT CHANGE UP (ref 5–17)
AST SERPL-CCNC: 19 U/L — SIGNIFICANT CHANGE UP (ref 10–40)
BARBITURATES UR SCN-MCNC: NEGATIVE — SIGNIFICANT CHANGE UP
BASOPHILS NFR BLD AUTO: 0.3 % — SIGNIFICANT CHANGE UP (ref 0–2)
BENZODIAZ UR-MCNC: NEGATIVE — SIGNIFICANT CHANGE UP
BILIRUB SERPL-MCNC: 0.4 MG/DL — SIGNIFICANT CHANGE UP (ref 0.2–1.2)
BUN SERPL-MCNC: 15 MG/DL — SIGNIFICANT CHANGE UP (ref 7–23)
CALCIUM SERPL-MCNC: 9.7 MG/DL — SIGNIFICANT CHANGE UP (ref 8.4–10.5)
CHLORIDE SERPL-SCNC: 101 MMOL/L — SIGNIFICANT CHANGE UP (ref 96–108)
CO2 SERPL-SCNC: 23 MMOL/L — SIGNIFICANT CHANGE UP (ref 22–31)
COCAINE METAB.OTHER UR-MCNC: NEGATIVE — SIGNIFICANT CHANGE UP
CREAT SERPL-MCNC: 0.71 MG/DL — SIGNIFICANT CHANGE UP (ref 0.5–1.3)
EOSINOPHIL NFR BLD AUTO: 1.4 % — SIGNIFICANT CHANGE UP (ref 0–6)
ETHANOL SERPL-MCNC: <10 MG/DL — SIGNIFICANT CHANGE UP (ref 0–10)
GLUCOSE SERPL-MCNC: 114 MG/DL — HIGH (ref 70–99)
HCT VFR BLD CALC: 38.5 % — LOW (ref 39–50)
HGB BLD-MCNC: 12.9 G/DL — LOW (ref 13–17)
LITHIUM SERPL-MCNC: 1 MMOL/L — SIGNIFICANT CHANGE UP (ref 0.6–1.2)
LYMPHOCYTES # BLD AUTO: 12.4 % — LOW (ref 13–44)
MCHC RBC-ENTMCNC: 29.7 PG — SIGNIFICANT CHANGE UP (ref 27–34)
MCHC RBC-ENTMCNC: 33.5 G/DL — SIGNIFICANT CHANGE UP (ref 32–36)
MCV RBC AUTO: 88.5 FL — SIGNIFICANT CHANGE UP (ref 80–100)
METHADONE UR-MCNC: NEGATIVE — SIGNIFICANT CHANGE UP
MONOCYTES NFR BLD AUTO: 8.1 % — SIGNIFICANT CHANGE UP (ref 2–14)
NEUTROPHILS NFR BLD AUTO: 77.8 % — HIGH (ref 43–77)
OPIATES UR-MCNC: NEGATIVE — SIGNIFICANT CHANGE UP
PCP SPEC-MCNC: SIGNIFICANT CHANGE UP
PCP UR-MCNC: NEGATIVE — SIGNIFICANT CHANGE UP
PLATELET # BLD AUTO: 262 K/UL — SIGNIFICANT CHANGE UP (ref 150–400)
POTASSIUM SERPL-MCNC: 3.4 MMOL/L — LOW (ref 3.5–5.3)
POTASSIUM SERPL-SCNC: 3.4 MMOL/L — LOW (ref 3.5–5.3)
PROT SERPL-MCNC: 7.6 G/DL — SIGNIFICANT CHANGE UP (ref 6–8.3)
RBC # BLD: 4.35 M/UL — SIGNIFICANT CHANGE UP (ref 4.2–5.8)
RBC # FLD: 14.6 % — SIGNIFICANT CHANGE UP (ref 10.3–16.9)
SODIUM SERPL-SCNC: 136 MMOL/L — SIGNIFICANT CHANGE UP (ref 135–145)
THC UR QL: NEGATIVE — SIGNIFICANT CHANGE UP
TSH SERPL-MCNC: 1.05 UIU/ML — SIGNIFICANT CHANGE UP (ref 0.35–4.94)
WBC # BLD: 10.9 K/UL — HIGH (ref 3.8–10.5)
WBC # FLD AUTO: 10.9 K/UL — HIGH (ref 3.8–10.5)

## 2018-02-20 PROCEDURE — 99285 EMERGENCY DEPT VISIT HI MDM: CPT | Mod: 25

## 2018-02-20 PROCEDURE — 93010 ELECTROCARDIOGRAM REPORT: CPT

## 2018-02-20 PROCEDURE — 90792 PSYCH DIAG EVAL W/MED SRVCS: CPT

## 2018-02-20 RX ORDER — ACETAMINOPHEN 500 MG
650 TABLET ORAL EVERY 6 HOURS
Qty: 0 | Refills: 0 | Status: DISCONTINUED | OUTPATIENT
Start: 2018-02-20 | End: 2018-03-08

## 2018-02-20 RX ORDER — LITHIUM CARBONATE 300 MG/1
450 TABLET, EXTENDED RELEASE ORAL DAILY
Qty: 0 | Refills: 0 | Status: DISCONTINUED | OUTPATIENT
Start: 2018-02-20 | End: 2018-03-08

## 2018-02-20 RX ORDER — CLONAZEPAM 1 MG
1 TABLET ORAL AT BEDTIME
Qty: 0 | Refills: 0 | Status: DISCONTINUED | OUTPATIENT
Start: 2018-02-20 | End: 2018-02-27

## 2018-02-20 RX ORDER — VENLAFAXINE HCL 75 MG
150 CAPSULE, EXT RELEASE 24 HR ORAL DAILY
Qty: 0 | Refills: 0 | Status: DISCONTINUED | OUTPATIENT
Start: 2018-02-20 | End: 2018-02-22

## 2018-02-20 RX ORDER — FLUPHENAZINE HYDROCHLORIDE 1 MG/1
10 TABLET, FILM COATED ORAL AT BEDTIME
Qty: 0 | Refills: 0 | Status: DISCONTINUED | OUTPATIENT
Start: 2018-02-20 | End: 2018-03-01

## 2018-02-20 RX ORDER — HALOPERIDOL DECANOATE 100 MG/ML
5 INJECTION INTRAMUSCULAR EVERY 4 HOURS
Qty: 0 | Refills: 0 | Status: DISCONTINUED | OUTPATIENT
Start: 2018-02-20 | End: 2018-03-08

## 2018-02-20 RX ORDER — LITHIUM CARBONATE 300 MG/1
600 TABLET, EXTENDED RELEASE ORAL AT BEDTIME
Qty: 0 | Refills: 0 | Status: DISCONTINUED | OUTPATIENT
Start: 2018-02-20 | End: 2018-03-08

## 2018-02-20 RX ORDER — FLUPHENAZINE HYDROCHLORIDE 1 MG/1
5 TABLET, FILM COATED ORAL DAILY
Qty: 0 | Refills: 0 | Status: DISCONTINUED | OUTPATIENT
Start: 2018-02-20 | End: 2018-03-05

## 2018-02-20 RX ORDER — TRAZODONE HCL 50 MG
100 TABLET ORAL AT BEDTIME
Qty: 0 | Refills: 0 | Status: DISCONTINUED | OUTPATIENT
Start: 2018-02-20 | End: 2018-03-08

## 2018-02-20 RX ORDER — BENZTROPINE MESYLATE 1 MG
1 TABLET ORAL AT BEDTIME
Qty: 0 | Refills: 0 | Status: DISCONTINUED | OUTPATIENT
Start: 2018-02-20 | End: 2018-02-26

## 2018-02-20 RX ADMIN — Medication 1 MILLIGRAM(S): at 21:48

## 2018-02-20 RX ADMIN — Medication 1 MILLIGRAM(S): at 21:49

## 2018-02-20 RX ADMIN — LITHIUM CARBONATE 600 MILLIGRAM(S): 300 TABLET, EXTENDED RELEASE ORAL at 21:48

## 2018-02-20 RX ADMIN — FLUPHENAZINE HYDROCHLORIDE 10 MILLIGRAM(S): 1 TABLET, FILM COATED ORAL at 23:04

## 2018-02-20 RX ADMIN — Medication 100 MILLIGRAM(S): at 21:49

## 2018-02-20 NOTE — ED BEHAVIORAL HEALTH ASSESSMENT NOTE - MEDICAL ISSUES AND PLAN (INCLUDE STANDING AND PRN MEDICATION)
HTN: BP elevated in ED but patient not on antihypertensives.  Continue monitoring BP and low threshold for calling medicine consult.

## 2018-02-20 NOTE — ED BEHAVIORAL HEALTH ASSESSMENT NOTE - CURRENT MEDICATION
Prolixin 5/10, lithium 450/600, Klonopin 1 mg PO HS, trazodone 100 mg PO HS, Effexor 150 mg PO daily, cogentin 1 mg PO HS

## 2018-02-20 NOTE — ED ADULT NURSE NOTE - CHIEF COMPLAINT QUOTE
Pt BIBA from home c/o depression, trouble sleeping, anxiety for the past week. Pt states he was admitted to psych here at [facility] and discharged a week ago and feels he needed more time here. Pt denies SI or HI. Hx of bipolar disorder.  Pt primarily speaks Maltese, EMS used as .

## 2018-02-20 NOTE — ED PROVIDER NOTE - MEDICAL DECISION MAKING DETAILS
anxiety with hx of schizoaffective d/o. labs noted. pt appears anxious. psych consulted and evaluated pt in ED. recommend admission. BP elevated likely due to agitation and improved with repeat. anxiety with hx of schizoaffective d/o. labs noted. pt appears anxious. psych consulted and evaluated pt in ED. recommend admission. BP elevated likely due to agitation and improved with repeat. no cp or ha

## 2018-02-20 NOTE — ED BEHAVIORAL HEALTH ASSESSMENT NOTE - SUMMARY
52M h/o schizoaffective disorder, recent admsision on 8Uris for paranoia, returning reporting "high anxiety" as per mother triggered by nonadherence to Prolixin last week.  Patient and mother reporting poor sleep and agitation and requesting psychiatric admission.  Would be suspicious for ongoing delusional thinking given that as per chart patient was not forthcomign during recent admission about delusions.  Patient meets criteria for voluntary psychiatric admission fro observation and stabilization.

## 2018-02-20 NOTE — ED ADULT NURSE NOTE - OBJECTIVE STATEMENT
pt reports difficulty sleeping and anxiety x 1 week.  States he has "subclinical depression".  States he is familiar with depression terminology because his father is a neurologist.  Denies SI or HI.  Denies hallucinations.  States his BP has been high.  States he used to take HTN meds but his PCP discontinued them.  Recently admitted to inpatient psych.  Discharged on 2/5/18.  Pt presents with his mother.   phone used for history as pt speaks Greek. pt reports difficulty sleeping and anxiety x 1 week.  States he has "subclinical depression".  States he is familiar with depression terminology because his father is a neurologist.  Denies SI or HI.  Denies hallucinations.  States his BP has been high.  States he used to take HTN meds but his PCP discontinued them.  Recently admitted to inpatient psych.  Discharged on 2/5/18.  Pt presents with his mother.   phone used for history as pt speaks English.  Patient walks with steady gait.

## 2018-02-20 NOTE — ED PROVIDER NOTE - OBJECTIVE STATEMENT
53 y/o male with hx of schizoaffective d/o c/o anxiety. Pt states recently admitted to psych here but still exp anxiety. Pt states" he just does not feel right". no depression. pt states he is taking his medications and has f/u with psych as an  outpt. pt denies depression, HI, SI, AH or VH. no cp, sob, abd pain, n/v. no further complaints. 53 y/o male with hx of schizoaffective d/o c/o anxiety. Pt states recently admitted to psych here but still exp anxiety. Pt states" he just does not feel right". no depression. pt states he is taking his medications and has f/u with psych as an  outpt. pt denies depression, HI, SI, AH or VH. no cp, sob, abd pain, n/v. no further complaints. hx provided with use of  phone

## 2018-02-20 NOTE — ED ADULT TRIAGE NOTE - CHIEF COMPLAINT QUOTE
Pt BIBA from home c/o depression, trouble sleeping, anxiety for the past week. Pt states he was admitted to psych here at [facility] and discharged a week ago and feels he needed more time here. Pt denies SI or HI. Hx of bipolar disorder.  Pt primarily speaks Yemeni, EMS used as .

## 2018-02-20 NOTE — ED ADULT NURSE REASSESSMENT NOTE - NS ED NURSE REASSESS COMMENT FT1
pt and pt belongings cleared by hospital security.  pt belongings secured at nurses station.  pt on 1:1 with PCA.

## 2018-02-20 NOTE — ED PROVIDER NOTE - ATTENDING CONTRIBUTION TO CARE
53 y/o male with hx of schizoaffective d/o c/o anxiety. Pt states recently admitted to psych here but still exp anxiety. Pt states" he just does not feel right". no depression. pt states he is taking his medications and has f/u with psych as an  outpt. pt denies depression, HI, SI, AH or VH. no cp, sob, abd pain, n/v. no further complaints. hx provided with use of 51 y/o male with hx of schizoaffective disorder p/w anxiety. Pt states recently admitted to psych here but still experiencing anxiety. Is compliant with psych meds. Pt denies depression, HI, SI, AH or VH. No cp, sob, abd pain, n/v. No other complaints. Pt AAO, NAD, anxious, RRR, CTA b/l, ab: soft/NT. Labs/ studies noted. Psych consulted and evaluated pt in ED. Pt admitted to psych. Medically cleared for psych admission.

## 2018-02-20 NOTE — ED BEHAVIORAL HEALTH ASSESSMENT NOTE - HPI (INCLUDE ILLNESS QUALITY, SEVERITY, DURATION, TIMING, CONTEXT, MODIFYING FACTORS, ASSOCIATED SIGNS AND SYMPTOMS)
[facility]  [id number] 52M Scottish speaking h/o schizoaffective disorder, bipolar type, multiple psych admissions most recently at [facility] d/yousuf 2/5/18 for paranoia, PMH HTN not on meds, BIB self for "high anxiety."  Patient is poor historian, oddly related.  Reports that he felt better for a few days after recent discharge but then started having "a strange feeling that my body is weightless" and "high anxiety."  Also reports poor sleep and low energy.  Is requesting admission.  Denies depression, AH/VH, SI/HI, changes in appetite, racing thoughts.  Denies feeling unsafe or targeted.  Denies pain, n/v, termor, confusion.  Reports he saw his psychiatrist Dr. Elkins on the day of discharge and yesterday and was told by him that if he didn't feel better to come to the ED.     Mother at bedside.  Better historian but also slightly unclear.  Reports patient started taking risperidone instead of Prolixin after discharge for 2 days and became "a maniac."  Then went to see Dr. Elkins who told patient to resume Prolixin.  Patient's symptoms improved but he has continued to not sleep, appear more anxious and paranoid, stay in bed all day, be isolative, and not watch TV as usual.  Mother also says Dr. Elkins started new medication for sleep yesterday but she does not remember what it was.    Left message for Dr. Elkins [phone].

## 2018-02-20 NOTE — ED BEHAVIORAL HEALTH ASSESSMENT NOTE - DESCRIPTION
From [place].  Lives with mother, son, and son's wife. HTN, on meds last year but not since Novemeber BP initially 170s/110s, decreased to 150s/90s without intervention, thought 2/2 "agitation" although patient overall cooperative, no prns needed

## 2018-02-20 NOTE — ED BEHAVIORAL HEALTH ASSESSMENT NOTE - DETAILS
[facility] Dr. Cardenas Dr. Cardenas, [facility] patient self referred Reports prior SI but none recently [facility] 1/23-2/5/18

## 2018-02-20 NOTE — ED BEHAVIORAL HEALTH ASSESSMENT NOTE - OTHER PAST PSYCHIATRIC HISTORY (INCLUDE DETAILS REGARDING ONSET, COURSE OF ILLNESS, INPATIENT/OUTPATIENT TREATMENT)
See HPI.  Currently seeing Dr. Elkins.  Discharged from [facility] on Prolixin, lithium, Klonopin, trazodone, Effexor, cogentin.

## 2018-02-21 DIAGNOSIS — F41.9 ANXIETY DISORDER, UNSPECIFIED: ICD-10-CM

## 2018-02-21 LAB — LITHIUM SERPL-MCNC: 0.77 MMOL/L — SIGNIFICANT CHANGE UP (ref 0.6–1.2)

## 2018-02-21 PROCEDURE — 99233 SBSQ HOSP IP/OBS HIGH 50: CPT

## 2018-02-21 RX ADMIN — LITHIUM CARBONATE 450 MILLIGRAM(S): 300 TABLET, EXTENDED RELEASE ORAL at 10:34

## 2018-02-21 RX ADMIN — FLUPHENAZINE HYDROCHLORIDE 10 MILLIGRAM(S): 1 TABLET, FILM COATED ORAL at 22:02

## 2018-02-21 RX ADMIN — Medication 150 MILLIGRAM(S): at 10:35

## 2018-02-21 RX ADMIN — Medication 1 MILLIGRAM(S): at 22:02

## 2018-02-21 RX ADMIN — Medication 100 MILLIGRAM(S): at 22:01

## 2018-02-21 RX ADMIN — FLUPHENAZINE HYDROCHLORIDE 5 MILLIGRAM(S): 1 TABLET, FILM COATED ORAL at 10:35

## 2018-02-21 RX ADMIN — LITHIUM CARBONATE 600 MILLIGRAM(S): 300 TABLET, EXTENDED RELEASE ORAL at 22:02

## 2018-02-21 NOTE — BEHAVIORAL HEALTH ASSESSMENT NOTE - MODIFICATIONS
would resume medications given at time of discharge last admission which includes Prolixin with counselling to patient to accept MEEK to prevent decompensation.

## 2018-02-21 NOTE — BEHAVIORAL HEALTH ASSESSMENT NOTE - HPI (INCLUDE ILLNESS QUALITY, SEVERITY, DURATION, TIMING, CONTEXT, MODIFYING FACTORS, ASSOCIATED SIGNS AND SYMPTOMS)
As per ED note: "[facility]  [id number] 52M Guyanese speaking h/o schizoaffective disorder, bipolar type, multiple psych admissions most recently at [facility] d/yousuf 2/5/18 for paranoia, PMH HTN not on meds, BIB self for "high anxiety."  Patient is poor historian, oddly related.  Reports that he felt better for a few days after recent discharge but then started having "a strange feeling that my body is weightless" and "high anxiety."  Also reports poor sleep and low energy.  Is requesting admission.  Denies depression, AH/VH, SI/HI, changes in appetite, racing thoughts.  Denies feeling unsafe or targeted.  Denies pain, n/v, termor, confusion.  Reports he saw his psychiatrist Dr. Elkins on the day of discharge and yesterday and was told by him that if he didn't feel better to come to the ED.     Mother at bedside.  Better historian but also slightly unclear.  Reports patient started taking risperidone instead of Prolixin after discharge for 2 days and became "a maniac."  Then went to see Dr. Elkins who told patient to resume Prolixin.  Patient's symptoms improved but he has continued to not sleep, appear more anxious and paranoid, stay in bed all day, be isolative, and not watch TV as usual.  Mother also says Dr. Elkins started new medication for sleep yesterday but she does not remember what it was.    Left message for Dr. Elkins [phone]."    Patient evaluated and cased discussed with the treatment team and attending supervisor. [employer]  service: ID # [id number] used to interview the patient. Patient shown and read legal admission papers (volunteer admission form; 9.13) with the  service. Patient requested to discontinue interview in context of feeling anxious requesting "need a break"; presents alert, oriented and superficially cooperative during the interview. Reports feeling "fatigued, nervous and anxious" with congruent affect. Reports feeling anxious since discharge as reason to seek admission. Pt reports switching Prolixin to Risperidone stating that "could not sleep at all with Prolixin and Risperidone helps".     Patient will be evaluated further, offered benadryl but refused, in context of feeling anxious. Denies any acute medical issues and side effects from medications.

## 2018-02-21 NOTE — BEHAVIORAL HEALTH ASSESSMENT NOTE - SUMMARY
52M h/o schizoaffective disorder, recent admsision on 8Uris for paranoia, returning reporting "high anxiety" as per mother triggered by nonadherence to Prolixin last week.  Patient and mother reporting poor sleep and agitation and requesting psychiatric admission.  Would be suspicious for ongoing delusional thinking given that as per chart patient was not forthcomign during recent admission about delusions.  Patient meets criteria for voluntary psychiatric admission fro observation and stabilization.    ;;02/21: Pt continues to be anxious, requesting to d/c interview with irritable affect, will continue medications and be evaluated later.

## 2018-02-21 NOTE — BEHAVIORAL HEALTH ASSESSMENT NOTE - NSBHCHARTREVIEWINVESTIGATE_PSY_A_CORE FT
< from: 12 Lead ECG (02.20.18 @ 10:55) >    Ventricular Rate 82 BPM    Atrial Rate 82 BPM    P-R Interval 148 ms    QRS Duration 94 ms    Q-T Interval 382 ms    QTC Calculation(Bezet) 446 ms    P Axis 59 degrees    R Axis -15 degrees    T Axis 84 degrees    Diagnosis Line Normal sinus rhythm  Left ventricular hypertrophy with repolarization abnormality    < end of copied text >

## 2018-02-21 NOTE — BEHAVIORAL HEALTH ASSESSMENT NOTE - NSBHADMITIPSTRENGTH_PSY_A_CORE
Able to exercise self-direction/In good physical health/Knowledge of medications/Motivated and ready for change

## 2018-02-21 NOTE — BEHAVIORAL HEALTH ASSESSMENT NOTE - NSBHADMITCOUNSEL_PSY_A_CORE
diagnostic results/impressions and/or recommended studies/instructions for management, treatment and follow up/risk factor reduction/other.../difficulty communicating/risks and benefits of treatment options/importance of adherence to chosen treatment

## 2018-02-21 NOTE — BEHAVIORAL HEALTH ASSESSMENT NOTE - NSBHADMITCOUNSELOTHER_PSY_A_CORE FT
diffiuclty communicating because of technical issues and guardedness and paranoia.  At end of encounter the patient refused to use the  service despite eventually mariginal success in connection.  Did allow use of phrase  but denied any sxs wishing to end interview.  Will continue to make attempts. Patient appears to be accepting treatment and is aware of medications including Prolixin and Lithium.

## 2018-02-21 NOTE — BEHAVIORAL HEALTH ASSESSMENT NOTE - NSBHCHARTREVIEWVS_PSY_A_CORE FT
Vital Signs Last 24 Hrs  T(C): 36.1 (21 Feb 2018 09:10), Max: 37 (20 Feb 2018 15:45)  T(F): 97 (21 Feb 2018 09:10), Max: 98.6 (20 Feb 2018 15:45)  HR: 93 (21 Feb 2018 09:10) (79 - 93)  BP: 163/107 (21 Feb 2018 09:10) (161/93 - 163/107)  RR: 20 (21 Feb 2018 09:10) (18 - 20)  SpO2: 98% (20 Feb 2018 15:45) (98% - 98%)

## 2018-02-21 NOTE — BEHAVIORAL HEALTH ASSESSMENT NOTE - CASE SUMMARY
52M Turkmen speaking h/o schizoaffective disorder, bipolar type, multiple psych admissions most recently at [facility] d/yousuf 2/5/18 for paranoia, PMH HTN not on meds, BIB self for "high anxiety."  Patient is poor historian, oddly related.  Reports that he felt better for a few days after recent discharge but then started having "a strange feeling that my body is weightless" and "high anxiety."  Also reports poor sleep and low energy.  Is requesting admission.  Denies depression, AH/VH, SI/HI, changes in appetite, racing thoughts.  Denies feeling unsafe or targeted.  Denies pain, n/v, termor, confusion.  Reports he saw his psychiatrist Dr. Elkins on the day of discharge and yesterday and was told by him that if he didn't feel better to come to the ED.   ;;2/21: there were technical difficulties and unable to access [employer].  Resident was unable to obtain any details because the patient was very guarded.  Was able to use a phrase  to learn if the patient had any acute complaints.  He denied all symptoms but clear was suspicious, labile guarded, alert, no tremor , normal gait.  speech clear,  denied s/h i/i/p or avh.   Was polite but rather anxious.  Only wanted to stay in his room.

## 2018-02-21 NOTE — BEHAVIORAL HEALTH ASSESSMENT NOTE - PROBLEM SELECTOR PLAN 1
- Will continue Prolixin 5/10 mg and cogentin 0.5 mg BID for mood and thought process  - Lithium 450 mg / 600 mg BID for mood stabilization   - Trazodone 100 mg QHS for mood and insomnia  - Effexor 150 mg daily for mood  - IGM TX

## 2018-02-21 NOTE — BEHAVIORAL HEALTH ASSESSMENT NOTE - NSBHCHARTREVIEWLAB_PSY_A_CORE FT
Hemoglobin A1C, Whole Blood (02.20.18 @ 11:23)    Hemoglobin A1C, Whole Blood: 5.3:     Complete Blood Count + Automated Diff (02.20.18 @ 11:23)    WBC Count: 10.9 K/uL    RBC Count: 4.35 M/uL    Hemoglobin: 12.9 g/dL    Hematocrit: 38.5 %    Mean Cell Volume: 88.5 fL    Mean Cell Hemoglobin: 29.7 pg    Mean Cell Hemoglobin Conc: 33.5 g/dL    Red Cell Distrib Width: 14.6 %    Platelet Count - Automated: 262 K/uL    Auto Neutrophil %: 77.8: Please note that absolute numbers are not reported at [facility]. %    Auto Lymphocyte %: 12.4: Please note that absolute numbers are not reported at [facility]. %    Auto Monocyte %: 8.1: Please note that absolute numbers are not reported at [facility]. %    Auto Eosinophil %: 1.4: Please note that absolute numbers are not reported at [facility]. %    Auto Basophil %: 0.3: Please note that absolute numbers are not reported at [facility]. %

## 2018-02-22 PROCEDURE — 99231 SBSQ HOSP IP/OBS SF/LOW 25: CPT

## 2018-02-22 RX ORDER — VENLAFAXINE HCL 75 MG
75 CAPSULE, EXT RELEASE 24 HR ORAL DAILY
Qty: 0 | Refills: 0 | Status: DISCONTINUED | OUTPATIENT
Start: 2018-02-22 | End: 2018-02-23

## 2018-02-22 RX ADMIN — FLUPHENAZINE HYDROCHLORIDE 5 MILLIGRAM(S): 1 TABLET, FILM COATED ORAL at 10:12

## 2018-02-22 RX ADMIN — Medication 1 MILLIGRAM(S): at 21:17

## 2018-02-22 RX ADMIN — Medication 100 MILLIGRAM(S): at 21:18

## 2018-02-22 RX ADMIN — Medication 150 MILLIGRAM(S): at 10:12

## 2018-02-22 RX ADMIN — LITHIUM CARBONATE 600 MILLIGRAM(S): 300 TABLET, EXTENDED RELEASE ORAL at 21:17

## 2018-02-22 RX ADMIN — FLUPHENAZINE HYDROCHLORIDE 10 MILLIGRAM(S): 1 TABLET, FILM COATED ORAL at 21:17

## 2018-02-22 RX ADMIN — LITHIUM CARBONATE 450 MILLIGRAM(S): 300 TABLET, EXTENDED RELEASE ORAL at 10:12

## 2018-02-22 NOTE — PROGRESS NOTE BEHAVIORAL HEALTH - SUMMARY
52M h/o schizoaffective disorder, recent admsision on 8Uris for paranoia, returning reporting "high anxiety" as per mother triggered by nonadherence to Prolixin last week.  Patient and mother reporting poor sleep and agitation and requesting psychiatric admission.  Would be suspicious for ongoing delusional thinking given that as per chart patient was not forthcomign during recent admission about delusions.  Patient meets criteria for voluntary psychiatric admission fro observation and stabilization.    ;;02/21: Pt continues to be anxious, requesting to d/c interview with irritable affect, will continue medications and be evaluated later. 52M h/o schizoaffective disorder, recent admsision on 8Uris for paranoia, returning reporting "high anxiety" as per mother triggered by nonadherence to Prolixin last week.  Patient and mother reporting poor sleep and agitation and requesting psychiatric admission.  Would be suspicious for ongoing delusional thinking given that as per chart patient was not forthcomign during recent admission about delusions.  Patient meets criteria for voluntary psychiatric admission fro observation and stabilization.    ;;02/21: Pt continues to be anxious, requesting to d/c interview with irritable affect, will continue medications and be evaluated later.  ;;02/22: Pt continues to refuse  service for formal interviewing, denies any acute issues and reports feeling good; plan to continue medications

## 2018-02-22 NOTE — PROGRESS NOTE BEHAVIORAL HEALTH - NSBHCHARTREVIEWVS_PSY_A_CORE FT
Vital Signs Last 24 Hrs  T(C): 36.1 (21 Feb 2018 09:10), Max: 37 (20 Feb 2018 15:45)  T(F): 97 (21 Feb 2018 09:10), Max: 98.6 (20 Feb 2018 15:45)  HR: 93 (21 Feb 2018 09:10) (79 - 93)  BP: 163/107 (21 Feb 2018 09:10) (161/93 - 163/107)  RR: 20 (21 Feb 2018 09:10) (18 - 20)  SpO2: 98% (20 Feb 2018 15:45) (98% - 98%) Vital Signs Last 24 Hrs  T(C): 37 (22 Feb 2018 09:30), Max: 37 (22 Feb 2018 09:30)  T(F): 98.6 (22 Feb 2018 09:30), Max: 98.6 (22 Feb 2018 09:30)  HR: 97 (22 Feb 2018 09:30) (97 - 109)  BP: 142/101 (22 Feb 2018 09:30) (103/70 - 142/101)  BP(mean): --  RR: 18 (22 Feb 2018 09:30) (18 - 20)  SpO2: --

## 2018-02-23 PROCEDURE — 99231 SBSQ HOSP IP/OBS SF/LOW 25: CPT

## 2018-02-23 RX ORDER — VENLAFAXINE HCL 75 MG
37.5 CAPSULE, EXT RELEASE 24 HR ORAL DAILY
Qty: 0 | Refills: 0 | Status: DISCONTINUED | OUTPATIENT
Start: 2018-02-23 | End: 2018-02-26

## 2018-02-23 RX ADMIN — Medication 1 MILLIGRAM(S): at 21:11

## 2018-02-23 RX ADMIN — FLUPHENAZINE HYDROCHLORIDE 10 MILLIGRAM(S): 1 TABLET, FILM COATED ORAL at 21:12

## 2018-02-23 RX ADMIN — LITHIUM CARBONATE 450 MILLIGRAM(S): 300 TABLET, EXTENDED RELEASE ORAL at 10:05

## 2018-02-23 RX ADMIN — FLUPHENAZINE HYDROCHLORIDE 5 MILLIGRAM(S): 1 TABLET, FILM COATED ORAL at 10:05

## 2018-02-23 RX ADMIN — LITHIUM CARBONATE 600 MILLIGRAM(S): 300 TABLET, EXTENDED RELEASE ORAL at 21:11

## 2018-02-23 RX ADMIN — Medication 1 MILLIGRAM(S): at 21:12

## 2018-02-23 RX ADMIN — Medication 75 MILLIGRAM(S): at 10:05

## 2018-02-23 RX ADMIN — Medication 100 MILLIGRAM(S): at 21:12

## 2018-02-23 NOTE — PROGRESS NOTE BEHAVIORAL HEALTH - SUMMARY
52M h/o schizoaffective disorder, recent admsision on 8Uris for paranoia, returning reporting "high anxiety" as per mother triggered by nonadherence to Prolixin last week.  Patient and mother reporting poor sleep and agitation and requesting psychiatric admission.  Would be suspicious for ongoing delusional thinking given that as per chart patient was not forthcomign during recent admission about delusions.  Patient meets criteria for voluntary psychiatric admission fro observation and stabilization.    ;;02/21: Pt continues to be anxious, requesting to d/c interview with irritable affect, will continue medications and be evaluated later.  ;;02/22: Pt continues to refuse  service for formal interviewing, denies any acute issues and reports feeling good; plan to continue medications  ;;02/23: Pt cooperative and less irritable; agrees to use  service; reports intermittent anxiety; denies AVH ad CORRINA; plan to tanya effexor

## 2018-02-23 NOTE — PROGRESS NOTE BEHAVIORAL HEALTH - NSBHCHARTREVIEWVS_PSY_A_CORE FT
Vital Signs Last 24 Hrs  T(C): 36.6 (23 Feb 2018 09:00), Max: 37 (22 Feb 2018 16:42)  T(F): 97.9 (23 Feb 2018 09:00), Max: 98.6 (22 Feb 2018 16:42)  HR: 96 (23 Feb 2018 09:00) (71 - 96)  BP: 150/101 (23 Feb 2018 09:00) (145/91 - 183/94)  BP(mean): --  RR: 18 (23 Feb 2018 09:00) (18 - 20)  SpO2: --

## 2018-02-24 RX ADMIN — Medication 100 MILLIGRAM(S): at 21:42

## 2018-02-24 RX ADMIN — FLUPHENAZINE HYDROCHLORIDE 5 MILLIGRAM(S): 1 TABLET, FILM COATED ORAL at 09:45

## 2018-02-24 RX ADMIN — Medication 37.5 MILLIGRAM(S): at 09:45

## 2018-02-24 RX ADMIN — Medication 1 MILLIGRAM(S): at 21:41

## 2018-02-24 RX ADMIN — LITHIUM CARBONATE 600 MILLIGRAM(S): 300 TABLET, EXTENDED RELEASE ORAL at 21:42

## 2018-02-24 RX ADMIN — LITHIUM CARBONATE 450 MILLIGRAM(S): 300 TABLET, EXTENDED RELEASE ORAL at 09:46

## 2018-02-24 RX ADMIN — FLUPHENAZINE HYDROCHLORIDE 10 MILLIGRAM(S): 1 TABLET, FILM COATED ORAL at 21:41

## 2018-02-25 RX ADMIN — FLUPHENAZINE HYDROCHLORIDE 10 MILLIGRAM(S): 1 TABLET, FILM COATED ORAL at 21:33

## 2018-02-25 RX ADMIN — LITHIUM CARBONATE 450 MILLIGRAM(S): 300 TABLET, EXTENDED RELEASE ORAL at 09:35

## 2018-02-25 RX ADMIN — Medication 1 MILLIGRAM(S): at 21:33

## 2018-02-25 RX ADMIN — Medication 1 MILLIGRAM(S): at 21:34

## 2018-02-25 RX ADMIN — LITHIUM CARBONATE 600 MILLIGRAM(S): 300 TABLET, EXTENDED RELEASE ORAL at 21:34

## 2018-02-25 RX ADMIN — Medication 100 MILLIGRAM(S): at 21:34

## 2018-02-25 RX ADMIN — FLUPHENAZINE HYDROCHLORIDE 5 MILLIGRAM(S): 1 TABLET, FILM COATED ORAL at 09:35

## 2018-02-25 RX ADMIN — Medication 37.5 MILLIGRAM(S): at 09:35

## 2018-02-26 PROCEDURE — 99231 SBSQ HOSP IP/OBS SF/LOW 25: CPT

## 2018-02-26 RX ORDER — BENZTROPINE MESYLATE 1 MG
1 TABLET ORAL EVERY 12 HOURS
Qty: 0 | Refills: 0 | Status: DISCONTINUED | OUTPATIENT
Start: 2018-02-26 | End: 2018-03-07

## 2018-02-26 RX ADMIN — LITHIUM CARBONATE 600 MILLIGRAM(S): 300 TABLET, EXTENDED RELEASE ORAL at 21:17

## 2018-02-26 RX ADMIN — Medication 100 MILLIGRAM(S): at 21:17

## 2018-02-26 RX ADMIN — Medication 1 MILLIGRAM(S): at 21:17

## 2018-02-26 RX ADMIN — LITHIUM CARBONATE 450 MILLIGRAM(S): 300 TABLET, EXTENDED RELEASE ORAL at 10:28

## 2018-02-26 RX ADMIN — FLUPHENAZINE HYDROCHLORIDE 10 MILLIGRAM(S): 1 TABLET, FILM COATED ORAL at 21:17

## 2018-02-26 RX ADMIN — Medication 37.5 MILLIGRAM(S): at 10:22

## 2018-02-26 RX ADMIN — FLUPHENAZINE HYDROCHLORIDE 5 MILLIGRAM(S): 1 TABLET, FILM COATED ORAL at 10:28

## 2018-02-26 NOTE — PROGRESS NOTE BEHAVIORAL HEALTH - PROBLEM SELECTOR PLAN 1
- Will continue Prolixin 5/10 mg and cogentin 1 mg BID for mood and thought process  - Lithium 450 mg / 600 mg BID for mood stabilization   - Trazodone 100 mg QHS for mood and insomnia  - IGM TX

## 2018-02-26 NOTE — PROGRESS NOTE BEHAVIORAL HEALTH - NSBHCHARTREVIEWVS_PSY_A_CORE FT
Vital Signs Last 24 Hrs  T(C): 36.8 (26 Feb 2018 10:00), Max: 36.8 (25 Feb 2018 17:00)  T(F): 98.3 (26 Feb 2018 10:00), Max: 98.3 (26 Feb 2018 10:00)  HR: 90 (26 Feb 2018 10:00) (73 - 90)  BP: 131/84 (26 Feb 2018 10:00) (131/84 - 133/93)  BP(mean): --  RR: 18 (26 Feb 2018 10:00) (17 - 18)  SpO2: --

## 2018-02-27 LAB
ALBUMIN SERPL ELPH-MCNC: 3.9 G/DL — SIGNIFICANT CHANGE UP (ref 3.3–5)
ALP SERPL-CCNC: 68 U/L — SIGNIFICANT CHANGE UP (ref 40–120)
ALT FLD-CCNC: 13 U/L — SIGNIFICANT CHANGE UP (ref 10–45)
ANION GAP SERPL CALC-SCNC: 10 MMOL/L — SIGNIFICANT CHANGE UP (ref 5–17)
AST SERPL-CCNC: 13 U/L — SIGNIFICANT CHANGE UP (ref 10–40)
BILIRUB SERPL-MCNC: 0.6 MG/DL — SIGNIFICANT CHANGE UP (ref 0.2–1.2)
BUN SERPL-MCNC: 16 MG/DL — SIGNIFICANT CHANGE UP (ref 7–23)
CALCIUM SERPL-MCNC: 9.3 MG/DL — SIGNIFICANT CHANGE UP (ref 8.4–10.5)
CHLORIDE SERPL-SCNC: 103 MMOL/L — SIGNIFICANT CHANGE UP (ref 96–108)
CO2 SERPL-SCNC: 26 MMOL/L — SIGNIFICANT CHANGE UP (ref 22–31)
CREAT SERPL-MCNC: 0.8 MG/DL — SIGNIFICANT CHANGE UP (ref 0.5–1.3)
GLUCOSE SERPL-MCNC: 107 MG/DL — HIGH (ref 70–99)
LITHIUM SERPL-MCNC: 0.86 MMOL/L — SIGNIFICANT CHANGE UP (ref 0.6–1.2)
POTASSIUM SERPL-MCNC: 4 MMOL/L — SIGNIFICANT CHANGE UP (ref 3.5–5.3)
POTASSIUM SERPL-SCNC: 4 MMOL/L — SIGNIFICANT CHANGE UP (ref 3.5–5.3)
PROT SERPL-MCNC: 6.7 G/DL — SIGNIFICANT CHANGE UP (ref 6–8.3)
SODIUM SERPL-SCNC: 139 MMOL/L — SIGNIFICANT CHANGE UP (ref 135–145)

## 2018-02-27 PROCEDURE — 99231 SBSQ HOSP IP/OBS SF/LOW 25: CPT

## 2018-02-27 RX ORDER — CLONAZEPAM 1 MG
1 TABLET ORAL AT BEDTIME
Qty: 0 | Refills: 0 | Status: DISCONTINUED | OUTPATIENT
Start: 2018-02-27 | End: 2018-03-06

## 2018-02-27 RX ADMIN — LITHIUM CARBONATE 600 MILLIGRAM(S): 300 TABLET, EXTENDED RELEASE ORAL at 21:21

## 2018-02-27 RX ADMIN — FLUPHENAZINE HYDROCHLORIDE 10 MILLIGRAM(S): 1 TABLET, FILM COATED ORAL at 21:21

## 2018-02-27 RX ADMIN — Medication 1 MILLIGRAM(S): at 21:22

## 2018-02-27 RX ADMIN — LITHIUM CARBONATE 450 MILLIGRAM(S): 300 TABLET, EXTENDED RELEASE ORAL at 10:21

## 2018-02-27 RX ADMIN — FLUPHENAZINE HYDROCHLORIDE 5 MILLIGRAM(S): 1 TABLET, FILM COATED ORAL at 10:21

## 2018-02-27 RX ADMIN — Medication 1 MILLIGRAM(S): at 10:20

## 2018-02-27 RX ADMIN — Medication 100 MILLIGRAM(S): at 21:21

## 2018-02-27 NOTE — PROGRESS NOTE BEHAVIORAL HEALTH - SUMMARY
52M h/o schizoaffective disorder, recent admsision on 8Uris for paranoia, returning reporting "high anxiety" as per mother triggered by nonadherence to Prolixin last week.  Patient and mother reporting poor sleep and agitation and requesting psychiatric admission.  Would be suspicious for ongoing delusional thinking given that as per chart patient was not forthcomign during recent admission about delusions.  Patient meets criteria for voluntary psychiatric admission fro observation and stabilization.    ;;02/21: Pt continues to be anxious, requesting to d/c interview with irritable affect, will continue medications and be evaluated later.  ;;02/22: Pt continues to refuse  service for formal interviewing, denies any acute issues and reports feeling good; plan to continue medications  ;;02/23: Pt cooperative and less irritable; agrees to use  service; reports intermittent anxiety; denies AVH ad CORRINA; plan to taper and d/c Effexor consider increasing Klonopin (but patient does not want am meds).

## 2018-02-27 NOTE — PROGRESS NOTE BEHAVIORAL HEALTH - NSBHCHARTREVIEWVS_PSY_A_CORE FT
Vital Signs Last 24 Hrs  T(C): 36.4 (27 Feb 2018 09:58), Max: 37.2 (26 Feb 2018 17:25)  T(F): 97.6 (27 Feb 2018 09:58), Max: 98.9 (26 Feb 2018 17:25)  HR: 79 (27 Feb 2018 09:58) (75 - 79)  BP: 156/93 (27 Feb 2018 09:58) (134/91 - 156/93)  BP(mean): --  RR: 20 (27 Feb 2018 09:58) (20 - 20)  SpO2: --

## 2018-02-28 PROCEDURE — 99231 SBSQ HOSP IP/OBS SF/LOW 25: CPT

## 2018-02-28 RX ADMIN — FLUPHENAZINE HYDROCHLORIDE 5 MILLIGRAM(S): 1 TABLET, FILM COATED ORAL at 09:37

## 2018-02-28 RX ADMIN — FLUPHENAZINE HYDROCHLORIDE 10 MILLIGRAM(S): 1 TABLET, FILM COATED ORAL at 21:36

## 2018-02-28 RX ADMIN — Medication 1 MILLIGRAM(S): at 21:33

## 2018-02-28 RX ADMIN — Medication 100 MILLIGRAM(S): at 21:33

## 2018-02-28 RX ADMIN — LITHIUM CARBONATE 450 MILLIGRAM(S): 300 TABLET, EXTENDED RELEASE ORAL at 09:37

## 2018-02-28 RX ADMIN — LITHIUM CARBONATE 600 MILLIGRAM(S): 300 TABLET, EXTENDED RELEASE ORAL at 21:33

## 2018-02-28 RX ADMIN — Medication 1 MILLIGRAM(S): at 10:01

## 2018-02-28 NOTE — PROGRESS NOTE BEHAVIORAL HEALTH - PROBLEM SELECTOR PLAN 1
- Will continue Prolixin 5/10 mg and cogentin 1 mg BID for mood and thought process  - Lithium 450 mg / 600 mg BID for mood stabilization (lvl 0.86 on 2/27)  - Trazodone 100 mg QHS for mood and insomnia  - IGM TX

## 2018-02-28 NOTE — PROGRESS NOTE BEHAVIORAL HEALTH - SUMMARY
52M h/o schizoaffective disorder, recent admsision on 8Uris for paranoia, returning reporting "high anxiety" as per mother triggered by nonadherence to Prolixin last week.  Patient and mother reporting poor sleep and agitation and requesting psychiatric admission.  Would be suspicious for ongoing delusional thinking given that as per chart patient was not forthcomign during recent admission about delusions.  Patient meets criteria for voluntary psychiatric admission fro observation and stabilization.    ;;02/21: Pt continues to be anxious, requesting to d/c interview with irritable affect, will continue medications and be evaluated later.  ;;02/22: Pt continues to refuse  service for formal interviewing, denies any acute issues and reports feeling good; plan to continue medications  ;;02/23: Pt cooperative and less irritable; agrees to use  service; reports intermittent anxiety; denies AVH ad CORRINA; plan to tanya effexor  2/28: better related and less isolative, less guarded; improving clinically

## 2018-03-01 PROCEDURE — 99231 SBSQ HOSP IP/OBS SF/LOW 25: CPT

## 2018-03-01 RX ORDER — FLUPHENAZINE HYDROCHLORIDE 1 MG/1
20 TABLET, FILM COATED ORAL AT BEDTIME
Qty: 0 | Refills: 0 | Status: DISCONTINUED | OUTPATIENT
Start: 2018-03-01 | End: 2018-03-05

## 2018-03-01 RX ADMIN — Medication 1 MILLIGRAM(S): at 22:06

## 2018-03-01 RX ADMIN — LITHIUM CARBONATE 600 MILLIGRAM(S): 300 TABLET, EXTENDED RELEASE ORAL at 22:06

## 2018-03-01 RX ADMIN — Medication 100 MILLIGRAM(S): at 22:07

## 2018-03-01 RX ADMIN — LITHIUM CARBONATE 450 MILLIGRAM(S): 300 TABLET, EXTENDED RELEASE ORAL at 10:18

## 2018-03-01 RX ADMIN — Medication 1 MILLIGRAM(S): at 10:18

## 2018-03-01 RX ADMIN — FLUPHENAZINE HYDROCHLORIDE 5 MILLIGRAM(S): 1 TABLET, FILM COATED ORAL at 10:18

## 2018-03-01 RX ADMIN — FLUPHENAZINE HYDROCHLORIDE 20 MILLIGRAM(S): 1 TABLET, FILM COATED ORAL at 22:07

## 2018-03-01 NOTE — PROGRESS NOTE BEHAVIORAL HEALTH - PROBLEM SELECTOR PLAN 1
- Will continue Prolixin 5/10 mg and cogentin 1 mg BID for mood and thought process  - Lithium 450 mg / 600 mg BID for mood stabilization (lvl 0.86 on 2/27)  - Trazodone 100 mg QHS for mood and insomnia  - IGM TX - Will continue Prolixin 5/20 mg and cogentin 1 mg BID for mood and thought process  - Lithium 450 mg / 600 mg BID for mood stabilization (lvl 0.86 on 2/27)  - Trazodone 100 mg QHS for mood and insomnia  - IGM TX

## 2018-03-01 NOTE — PROGRESS NOTE BEHAVIORAL HEALTH - NSBHCHARTREVIEWVS_PSY_A_CORE FT
Vital Signs Last 24 Hrs  T(C): 36.8 (01 Mar 2018 09:30), Max: 36.8 (28 Feb 2018 17:00)  T(F): 98.3 (01 Mar 2018 09:30), Max: 98.3 (28 Feb 2018 17:00)  HR: 76 (01 Mar 2018 09:30) (69 - 76)  BP: 137/85 (01 Mar 2018 09:30) (137/85 - 147/93)  BP(mean): --  RR: 20 (01 Mar 2018 09:30) (18 - 20)  SpO2: --

## 2018-03-01 NOTE — PROGRESS NOTE BEHAVIORAL HEALTH - RISK ASSESSMENT
Patient at chronically elevated risk of self harm given prior SI, SA, paranoia and recent medication change. He needs inpatient hospitalization for medication management.

## 2018-03-01 NOTE — PROGRESS NOTE BEHAVIORAL HEALTH - SUMMARY
52M h/o schizoaffective disorder, recent admsision on 8Uris for paranoia, returning reporting "high anxiety" as per mother triggered by nonadherence to Prolixin last week.  Patient and mother reporting poor sleep and agitation and requesting psychiatric admission.  Would be suspicious for ongoing delusional thinking given that as per chart patient was not forthcomign during recent admission about delusions.  Patient meets criteria for voluntary psychiatric admission fro observation and stabilization.    ;;02/21: Pt continues to be anxious, requesting to d/c interview with irritable affect, will continue medications and be evaluated later.  ;;02/22: Pt continues to refuse  service for formal interviewing, denies any acute issues and reports feeling good; plan to continue medications  ;;02/23: Pt cooperative and less irritable; agrees to use  service; reports intermittent anxiety; denies AVH ad CORRINA; plan to tanya effexor  2/28: better related and less isolative, less guarded; improving clinically  3/1: better related and more sociable, however with poor boundaries and somewhat illogical

## 2018-03-02 PROCEDURE — 99231 SBSQ HOSP IP/OBS SF/LOW 25: CPT

## 2018-03-02 RX ADMIN — Medication 1 MILLIGRAM(S): at 21:55

## 2018-03-02 RX ADMIN — FLUPHENAZINE HYDROCHLORIDE 20 MILLIGRAM(S): 1 TABLET, FILM COATED ORAL at 21:55

## 2018-03-02 RX ADMIN — Medication 100 MILLIGRAM(S): at 21:55

## 2018-03-02 RX ADMIN — Medication 1 MILLIGRAM(S): at 10:12

## 2018-03-02 RX ADMIN — FLUPHENAZINE HYDROCHLORIDE 5 MILLIGRAM(S): 1 TABLET, FILM COATED ORAL at 10:12

## 2018-03-02 RX ADMIN — LITHIUM CARBONATE 600 MILLIGRAM(S): 300 TABLET, EXTENDED RELEASE ORAL at 21:55

## 2018-03-02 RX ADMIN — LITHIUM CARBONATE 450 MILLIGRAM(S): 300 TABLET, EXTENDED RELEASE ORAL at 10:12

## 2018-03-02 NOTE — PROGRESS NOTE BEHAVIORAL HEALTH - SUMMARY
52M h/o schizoaffective disorder, recent admsision on 8Uris for paranoia, returning reporting "high anxiety" as per mother triggered by nonadherence to Prolixin last week.  Patient and mother reporting poor sleep and agitation and requesting psychiatric admission.  Would be suspicious for ongoing delusional thinking given that as per chart patient was not forthcomign during recent admission about delusions.  Patient meets criteria for voluntary psychiatric admission fro observation and stabilization.    ;;02/21: Pt continues to be anxious, requesting to d/c interview with irritable affect, will continue medications and be evaluated later.  ;;02/22: Pt continues to refuse  service for formal interviewing, denies any acute issues and reports feeling good; plan to continue medications  ;;02/23: Pt cooperative and less irritable; agrees to use  service; reports intermittent anxiety; denies AVH ad CORRINA; plan to tanya effexor  2/28: better related and less isolative, less guarded; improving clinically  3/1: better related and more sociable, however with poor boundaries and somewhat illogical  3/2: less irritable and tolerating the increase in prolixin well; still with poor boundaries

## 2018-03-02 NOTE — PROGRESS NOTE BEHAVIORAL HEALTH - PROBLEM SELECTOR PLAN 1
- Will continue Prolixin 5/20 mg and cogentin 1 mg BID for mood and thought process  - Lithium 450 mg / 600 mg BID for mood stabilization (lvl 0.86 on 2/27)  - Trazodone 100 mg QHS for mood and insomnia  - IGM TX

## 2018-03-02 NOTE — PROGRESS NOTE BEHAVIORAL HEALTH - NSBHCHARTREVIEWVS_PSY_A_CORE FT
Vital Signs Last 24 Hrs  T(C): 37.2 (02 Mar 2018 10:00), Max: 37.4 (01 Mar 2018 17:00)  T(F): 98.9 (02 Mar 2018 10:00), Max: 99.3 (01 Mar 2018 17:00)  HR: 75 (02 Mar 2018 10:00) (75 - 82)  BP: 143/92 (02 Mar 2018 10:00) (139/93 - 143/92)  BP(mean): --  RR: 18 (02 Mar 2018 10:00) (18 - 18)  SpO2: --

## 2018-03-03 RX ADMIN — Medication 1 MILLIGRAM(S): at 21:32

## 2018-03-03 RX ADMIN — FLUPHENAZINE HYDROCHLORIDE 5 MILLIGRAM(S): 1 TABLET, FILM COATED ORAL at 10:14

## 2018-03-03 RX ADMIN — FLUPHENAZINE HYDROCHLORIDE 20 MILLIGRAM(S): 1 TABLET, FILM COATED ORAL at 21:32

## 2018-03-03 RX ADMIN — LITHIUM CARBONATE 600 MILLIGRAM(S): 300 TABLET, EXTENDED RELEASE ORAL at 21:32

## 2018-03-03 RX ADMIN — Medication 1 MILLIGRAM(S): at 10:14

## 2018-03-03 RX ADMIN — Medication 100 MILLIGRAM(S): at 21:33

## 2018-03-03 RX ADMIN — LITHIUM CARBONATE 450 MILLIGRAM(S): 300 TABLET, EXTENDED RELEASE ORAL at 11:00

## 2018-03-04 RX ADMIN — Medication 100 MILLIGRAM(S): at 21:15

## 2018-03-04 RX ADMIN — FLUPHENAZINE HYDROCHLORIDE 20 MILLIGRAM(S): 1 TABLET, FILM COATED ORAL at 21:15

## 2018-03-04 RX ADMIN — Medication 1 MILLIGRAM(S): at 21:15

## 2018-03-04 RX ADMIN — Medication 1 MILLIGRAM(S): at 09:52

## 2018-03-04 RX ADMIN — FLUPHENAZINE HYDROCHLORIDE 5 MILLIGRAM(S): 1 TABLET, FILM COATED ORAL at 11:41

## 2018-03-04 RX ADMIN — LITHIUM CARBONATE 450 MILLIGRAM(S): 300 TABLET, EXTENDED RELEASE ORAL at 11:41

## 2018-03-04 RX ADMIN — LITHIUM CARBONATE 600 MILLIGRAM(S): 300 TABLET, EXTENDED RELEASE ORAL at 21:15

## 2018-03-05 RX ORDER — FLUPHENAZINE HYDROCHLORIDE 1 MG/1
15 TABLET, FILM COATED ORAL AT BEDTIME
Qty: 0 | Refills: 0 | Status: DISCONTINUED | OUTPATIENT
Start: 2018-03-05 | End: 2018-03-05

## 2018-03-05 RX ORDER — FLUPHENAZINE HYDROCHLORIDE 1 MG/1
10 TABLET, FILM COATED ORAL
Qty: 0 | Refills: 0 | Status: DISCONTINUED | OUTPATIENT
Start: 2018-03-05 | End: 2018-03-05

## 2018-03-05 RX ORDER — FLUPHENAZINE HYDROCHLORIDE 1 MG/1
5 TABLET, FILM COATED ORAL DAILY
Qty: 0 | Refills: 0 | Status: DISCONTINUED | OUTPATIENT
Start: 2018-03-05 | End: 2018-03-08

## 2018-03-05 RX ORDER — FLUPHENAZINE HYDROCHLORIDE 1 MG/1
15 TABLET, FILM COATED ORAL AT BEDTIME
Qty: 0 | Refills: 0 | Status: DISCONTINUED | OUTPATIENT
Start: 2018-03-05 | End: 2018-03-08

## 2018-03-05 RX ORDER — FLUPHENAZINE HYDROCHLORIDE 1 MG/1
5 TABLET, FILM COATED ORAL AT BEDTIME
Qty: 0 | Refills: 0 | Status: DISCONTINUED | OUTPATIENT
Start: 2018-03-05 | End: 2018-03-05

## 2018-03-05 RX ADMIN — LITHIUM CARBONATE 600 MILLIGRAM(S): 300 TABLET, EXTENDED RELEASE ORAL at 21:54

## 2018-03-05 RX ADMIN — Medication 1 MILLIGRAM(S): at 21:54

## 2018-03-05 RX ADMIN — Medication 1 MILLIGRAM(S): at 10:38

## 2018-03-05 RX ADMIN — FLUPHENAZINE HYDROCHLORIDE 15 MILLIGRAM(S): 1 TABLET, FILM COATED ORAL at 21:54

## 2018-03-05 RX ADMIN — Medication 100 MILLIGRAM(S): at 21:54

## 2018-03-05 RX ADMIN — LITHIUM CARBONATE 450 MILLIGRAM(S): 300 TABLET, EXTENDED RELEASE ORAL at 10:38

## 2018-03-05 RX ADMIN — FLUPHENAZINE HYDROCHLORIDE 5 MILLIGRAM(S): 1 TABLET, FILM COATED ORAL at 10:37

## 2018-03-05 NOTE — PROVIDER CONTACT NOTE (MEDICATION) - SITUATION
Patient, 52 y.o. with primary diagnosis of Schizoaffective d.o. and documented HTN on Axis III presents with /100 within 3 hours period ( 2 consecutive measurements.

## 2018-03-05 NOTE — PROGRESS NOTE BEHAVIORAL HEALTH - PROBLEM SELECTOR PLAN 1
- Will continue Prolixin 5/20 mg and cogentin 1 mg BID for mood and thought process - plan to transition to MEEK  - Lithium 450 mg / 600 mg BID for mood stabilization (lvl 0.86 on 2/27)  - Trazodone 100 mg QHS for mood and insomnia  - IGM TX

## 2018-03-05 NOTE — PROGRESS NOTE BEHAVIORAL HEALTH - NSBHCHARTREVIEWVS_PSY_A_CORE FT
Vital Signs Last 24 Hrs  T(C): 36.9 (05 Mar 2018 09:02), Max: 36.9 (05 Mar 2018 09:02)  T(F): 98.5 (05 Mar 2018 09:02), Max: 98.5 (05 Mar 2018 09:02)  HR: 82 (05 Mar 2018 09:02) (79 - 82)  BP: 140/84 (05 Mar 2018 09:02) (138/96 - 140/84)  BP(mean): --  RR: 20 (05 Mar 2018 09:02) (16 - 20)  SpO2: -- Vital Signs Last 24 Hrs  T(C): 36.9 (05 Mar 2018 09:02), Max: 36.9 (05 Mar 2018 09:02)  T(F): 98.5 (05 Mar 2018 09:02), Max: 98.5 (05 Mar 2018 09:02)  HR: 82 (05 Mar 2018 09:02) (79 - 82)  BP: 140/84 (05 Mar 2018 09:02) (138/96 - 140/84)  BP(mean): --  RR: 20 (05 Mar 2018 09:02) (16 - 20)  SpO2: --

## 2018-03-05 NOTE — PROGRESS NOTE BEHAVIORAL HEALTH - SUMMARY
52M h/o schizoaffective disorder, recent admsision on 8Uris for paranoia, returning reporting "high anxiety" as per mother triggered by nonadherence to Prolixin last week.  Patient and mother reporting poor sleep and agitation and requesting psychiatric admission.  Would be suspicious for ongoing delusional thinking given that as per chart patient was not forthcomign during recent admission about delusions.  Patient meets criteria for voluntary psychiatric admission fro observation and stabilization.    ;;02/21: Pt continues to be anxious, requesting to d/c interview with irritable affect, will continue medications and be evaluated later.  ;;02/22: Pt continues to refuse  service for formal interviewing, denies any acute issues and reports feeling good; plan to continue medications  ;;02/23: Pt cooperative and less irritable; agrees to use  service; reports intermittent anxiety; denies AVH ad CORRINA; plan to tanya effexor  2/28: better related and less isolative, less guarded; improving clinically  3/1: better related and more sociable, however with poor boundaries and somewhat illogical  3/2: less irritable and tolerating the increase in prolixin well; still with poor boundaries  3/5: less irritable, less perseverative; positive dynamics

## 2018-03-05 NOTE — PROGRESS NOTE BEHAVIORAL HEALTH - RISK ASSESSMENT
Patient at chronically elevated risk of self harm given prior SI, SA, paranoia and recent medication change. He still needs inpatient hospitalization for medication management and transition to MEEK.

## 2018-03-05 NOTE — PROVIDER CONTACT NOTE (MEDICATION) - RECOMMENDATIONS
Discussed with Dr. Manzano, Medicine consult is recommended.  (patient is on Effexor, which can contribute to higher BP)

## 2018-03-05 NOTE — PROVIDER CONTACT NOTE (MEDICATION) - ASSESSMENT
Patient 52 y.o. with ds. of Schizoaffective DO and documented hx of HTN presents with two consecutive BP measurements as 161/102 (within 3 hours period). Patient was running 140/80 for the last several days, fo an  exclusion occasional  measurement of 166/100 3 days before.

## 2018-03-06 DIAGNOSIS — I10 ESSENTIAL (PRIMARY) HYPERTENSION: ICD-10-CM

## 2018-03-06 PROCEDURE — 12345: CPT | Mod: NC

## 2018-03-06 PROCEDURE — 99255 IP/OBS CONSLTJ NEW/EST HI 80: CPT | Mod: GC

## 2018-03-06 RX ORDER — AMLODIPINE BESYLATE 2.5 MG/1
5 TABLET ORAL DAILY
Qty: 0 | Refills: 0 | Status: DISCONTINUED | OUTPATIENT
Start: 2018-03-06 | End: 2018-03-08

## 2018-03-06 RX ORDER — CLONAZEPAM 1 MG
1 TABLET ORAL AT BEDTIME
Qty: 0 | Refills: 0 | Status: DISCONTINUED | OUTPATIENT
Start: 2018-03-06 | End: 2018-03-08

## 2018-03-06 RX ADMIN — Medication 100 MILLIGRAM(S): at 21:37

## 2018-03-06 RX ADMIN — LITHIUM CARBONATE 600 MILLIGRAM(S): 300 TABLET, EXTENDED RELEASE ORAL at 21:37

## 2018-03-06 RX ADMIN — Medication 1 MILLIGRAM(S): at 21:37

## 2018-03-06 RX ADMIN — FLUPHENAZINE HYDROCHLORIDE 15 MILLIGRAM(S): 1 TABLET, FILM COATED ORAL at 21:37

## 2018-03-06 RX ADMIN — AMLODIPINE BESYLATE 5 MILLIGRAM(S): 2.5 TABLET ORAL at 17:20

## 2018-03-06 RX ADMIN — Medication 1 MILLIGRAM(S): at 09:48

## 2018-03-06 RX ADMIN — FLUPHENAZINE HYDROCHLORIDE 5 MILLIGRAM(S): 1 TABLET, FILM COATED ORAL at 09:49

## 2018-03-06 RX ADMIN — LITHIUM CARBONATE 450 MILLIGRAM(S): 300 TABLET, EXTENDED RELEASE ORAL at 09:48

## 2018-03-06 NOTE — PROGRESS NOTE BEHAVIORAL HEALTH - NSBHCHARTREVIEWVS_PSY_A_CORE FT
Vital Signs Last 24 Hrs  T(C): 36.8 (05 Mar 2018 21:00), Max: 36.9 (05 Mar 2018 09:02)  T(F): 98.3 (05 Mar 2018 21:00), Max: 98.5 (05 Mar 2018 09:02)  HR: 72 (05 Mar 2018 21:00) (72 - 82)  BP: 161/102 (05 Mar 2018 21:00) (140/84 - 161/102)  BP(mean): --  RR: 18 (05 Mar 2018 21:00) (18 - 20)  SpO2: --

## 2018-03-06 NOTE — PROGRESS NOTE BEHAVIORAL HEALTH - PROBLEM SELECTOR PLAN 1
- Will continue Prolixin 5/20 mg and cogentin 1 mg BID for mood and thought process  - Lithium 450 mg / 600 mg BID for mood stabilization (lvl 0.86 on 2/27)  - Trazodone 100 mg QHS for mood and insomnia  - IGM TX - Will continue Prolixin 5/20 mg and cogentin 1 mg BID for mood and thought process  - Lithium 450 mg / 600 mg BID for mood stabilization (lvl 0.86 on 2/27)    - Trazodone 100 mg QHS for mood and insomnia  - IGM TX

## 2018-03-06 NOTE — PROGRESS NOTE BEHAVIORAL HEALTH - SUMMARY
52M h/o schizoaffective disorder, recent admsision on 8Uris for paranoia, returning reporting "high anxiety" as per mother triggered by nonadherence to Prolixin last week.  Patient and mother reporting poor sleep and agitation and requesting psychiatric admission.  Would be suspicious for ongoing delusional thinking given that as per chart patient was not forthcomign during recent admission about delusions.  Patient meets criteria for voluntary psychiatric admission fro observation and stabilization.    ;;02/21: Pt continues to be anxious, requesting to d/c interview with irritable affect, will continue medications and be evaluated later.  ;;02/22: Pt continues to refuse  service for formal interviewing, denies any acute issues and reports feeling good; plan to continue medications  ;;02/23: Pt cooperative and less irritable; agrees to use  service; reports intermittent anxiety; denies AVH ad CORRINA; plan to tanya effexor  2/28: better related and less isolative, less guarded; improving clinically  3/1: better related and more sociable, however with poor boundaries and somewhat illogical  3/2: less irritable and tolerating the increase in prolixin well; still with poor boundaries    ;;3/6: accepting Norvac 5mg po daily; discussed risks and benefits with [facility] patient; mood anxious but  less labile;  rejects MEEK; begin transition to aftercare; patient expects d/c by  3/9.

## 2018-03-07 PROCEDURE — 99232 SBSQ HOSP IP/OBS MODERATE 35: CPT

## 2018-03-07 RX ORDER — BENZTROPINE MESYLATE 1 MG
2 TABLET ORAL
Qty: 0 | Refills: 0 | Status: DISCONTINUED | OUTPATIENT
Start: 2018-03-07 | End: 2018-03-08

## 2018-03-07 RX ORDER — LITHIUM CARBONATE 300 MG/1
2 TABLET, EXTENDED RELEASE ORAL
Qty: 28 | Refills: 0 | OUTPATIENT
Start: 2018-03-07 | End: 2018-03-20

## 2018-03-07 RX ORDER — FLUPHENAZINE HYDROCHLORIDE 1 MG/1
1 TABLET, FILM COATED ORAL
Qty: 14 | Refills: 0 | OUTPATIENT
Start: 2018-03-07 | End: 2018-03-20

## 2018-03-07 RX ORDER — TRAZODONE HCL 50 MG
1 TABLET ORAL
Qty: 14 | Refills: 0 | OUTPATIENT
Start: 2018-03-07 | End: 2018-03-20

## 2018-03-07 RX ORDER — FLUPHENAZINE HYDROCHLORIDE 1 MG/1
3 TABLET, FILM COATED ORAL
Qty: 42 | Refills: 0 | OUTPATIENT
Start: 2018-03-07 | End: 2018-03-20

## 2018-03-07 RX ORDER — CLONAZEPAM 1 MG
1 TABLET ORAL
Qty: 0 | Refills: 0 | COMMUNITY

## 2018-03-07 RX ORDER — LITHIUM CARBONATE 300 MG/1
1 TABLET, EXTENDED RELEASE ORAL
Qty: 14 | Refills: 0 | OUTPATIENT
Start: 2018-03-07 | End: 2018-03-20

## 2018-03-07 RX ORDER — BENZTROPINE MESYLATE 1 MG
1 TABLET ORAL
Qty: 28 | Refills: 0 | OUTPATIENT
Start: 2018-03-07 | End: 2018-03-20

## 2018-03-07 RX ORDER — AMLODIPINE BESYLATE 2.5 MG/1
1 TABLET ORAL
Qty: 14 | Refills: 0 | OUTPATIENT
Start: 2018-03-07 | End: 2018-03-20

## 2018-03-07 RX ADMIN — Medication 1 MILLIGRAM(S): at 21:42

## 2018-03-07 RX ADMIN — FLUPHENAZINE HYDROCHLORIDE 5 MILLIGRAM(S): 1 TABLET, FILM COATED ORAL at 10:12

## 2018-03-07 RX ADMIN — FLUPHENAZINE HYDROCHLORIDE 15 MILLIGRAM(S): 1 TABLET, FILM COATED ORAL at 21:42

## 2018-03-07 RX ADMIN — LITHIUM CARBONATE 600 MILLIGRAM(S): 300 TABLET, EXTENDED RELEASE ORAL at 21:42

## 2018-03-07 RX ADMIN — LITHIUM CARBONATE 450 MILLIGRAM(S): 300 TABLET, EXTENDED RELEASE ORAL at 10:12

## 2018-03-07 RX ADMIN — Medication 100 MILLIGRAM(S): at 21:42

## 2018-03-07 RX ADMIN — AMLODIPINE BESYLATE 5 MILLIGRAM(S): 2.5 TABLET ORAL at 10:13

## 2018-03-07 RX ADMIN — Medication 1 MILLIGRAM(S): at 10:12

## 2018-03-07 RX ADMIN — Medication 2 MILLIGRAM(S): at 21:42

## 2018-03-07 NOTE — PROGRESS NOTE BEHAVIORAL HEALTH - SUMMARY
52M h/o schizoaffective disorder, recent admsision on 8Uris for paranoia, returning reporting "high anxiety" as per mother triggered by nonadherence to Prolixin last week.  Patient and mother reporting poor sleep and agitation and requesting psychiatric admission.  Would be suspicious for ongoing delusional thinking given that as per chart patient was not forthcomign during recent admission about delusions.  Patient meets criteria for voluntary psychiatric admission fro observation and stabilization.    ;;02/21: Pt continues to be anxious, requesting to d/c interview with irritable affect, will continue medications and be evaluated later.  ;;02/22: Pt continues to refuse  service for formal interviewing, denies any acute issues and reports feeling good; plan to continue medications  ;;02/23: Pt cooperative and less irritable; agrees to use  service; reports intermittent anxiety; denies AVH ad CORRINA; plan to tanya effexor  2/28: better related and less isolative, less guarded; improving clinically  3/1: better related and more sociable, however with poor boundaries and somewhat illogical  3/2: less irritable and tolerating the increase in prolixin well; still with poor boundaries  3/5: less irritable, less perseverative; positive dynamics  3/7: nearing discharge, stable and improving clinically

## 2018-03-07 NOTE — PROGRESS NOTE BEHAVIORAL HEALTH - RISK ASSESSMENT
Patient at chronically elevated risk of self harm given prior SI, SA, paranoia and recent medication change. Patient now stabilized and appropriate for discharge.

## 2018-03-07 NOTE — PROGRESS NOTE BEHAVIORAL HEALTH - PROBLEM SELECTOR PLAN 1
- Prolixin 5/15 mg qam/qhs and cogentin 2 mg BID for mood and thought process   - pt refusing MEEK at this time  - Lithium 450 mg / 600 mg BID for mood stabilization (lvl 0.86 on 2/27)  - Trazodone 100 mg QHS for mood and insomnia  - IGM TX

## 2018-03-07 NOTE — PROGRESS NOTE BEHAVIORAL HEALTH - NSBHCHARTREVIEWVS_PSY_A_CORE FT
Vital Signs Last 24 Hrs  T(C): 36.7 (07 Mar 2018 06:00), Max: 37.1 (06 Mar 2018 20:49)  T(F): 98 (07 Mar 2018 06:00), Max: 98.8 (06 Mar 2018 20:49)  HR: 80 (07 Mar 2018 06:00) (66 - 80)  BP: 126/89 (07 Mar 2018 06:00) (126/89 - 183/103)  BP(mean): --  RR: 18 (07 Mar 2018 06:00) (18 - 18)  SpO2: --

## 2018-03-08 VITALS
DIASTOLIC BLOOD PRESSURE: 86 MMHG | TEMPERATURE: 98 F | SYSTOLIC BLOOD PRESSURE: 138 MMHG | HEART RATE: 84 BPM | RESPIRATION RATE: 18 BRPM

## 2018-03-08 PROCEDURE — 99285 EMERGENCY DEPT VISIT HI MDM: CPT

## 2018-03-08 PROCEDURE — 83036 HEMOGLOBIN GLYCOSYLATED A1C: CPT

## 2018-03-08 PROCEDURE — 99238 HOSP IP/OBS DSCHRG MGMT 30/<: CPT

## 2018-03-08 PROCEDURE — 36415 COLL VENOUS BLD VENIPUNCTURE: CPT

## 2018-03-08 PROCEDURE — 93005 ELECTROCARDIOGRAM TRACING: CPT

## 2018-03-08 PROCEDURE — 80053 COMPREHEN METABOLIC PANEL: CPT

## 2018-03-08 PROCEDURE — 80061 LIPID PANEL: CPT

## 2018-03-08 PROCEDURE — 84443 ASSAY THYROID STIM HORMONE: CPT

## 2018-03-08 PROCEDURE — 85025 COMPLETE CBC W/AUTO DIFF WBC: CPT

## 2018-03-08 PROCEDURE — 80178 ASSAY OF LITHIUM: CPT

## 2018-03-08 PROCEDURE — 80307 DRUG TEST PRSMV CHEM ANLYZR: CPT

## 2018-03-08 RX ADMIN — FLUPHENAZINE HYDROCHLORIDE 5 MILLIGRAM(S): 1 TABLET, FILM COATED ORAL at 09:29

## 2018-03-08 RX ADMIN — LITHIUM CARBONATE 450 MILLIGRAM(S): 300 TABLET, EXTENDED RELEASE ORAL at 09:33

## 2018-03-08 RX ADMIN — AMLODIPINE BESYLATE 5 MILLIGRAM(S): 2.5 TABLET ORAL at 09:29

## 2018-03-08 RX ADMIN — Medication 2 MILLIGRAM(S): at 09:29

## 2018-03-08 NOTE — PROGRESS NOTE BEHAVIORAL HEALTH - RISK ASSESSMENT
Patient at chronically elevated risk of self harm given prior SI, SA, paranoia and recent medication change. Patient now stabilized and appropriate for discharge. Patient at chronically elevated risk of self harm given prior SI, SA, paranoia and recent medication change.  Patient is at low acute risk of harm to self at this time as he denies SI/HI/AH/VH, has euthymic affect, is future oriented, with supportive family and outpt treatment and newly referred to case management services. Patient now stabilized and appropriate for discharge home with family today.

## 2018-03-08 NOTE — PROGRESS NOTE BEHAVIORAL HEALTH - NSBHADMITMEDEDUDETAILS_A_CORE FT
regarding benefits and SE of all prescribed medications

## 2018-03-08 NOTE — PROGRESS NOTE BEHAVIORAL HEALTH - NS ED BHA REVIEW OF ED CHART AVAILABLE IMAGING REVIEWED
None available

## 2018-03-08 NOTE — PROGRESS NOTE BEHAVIORAL HEALTH - CASE SUMMARY
see above hx  ;;2/22:  Inteviewed in presence of treating resident;  patient continues paranoid and avoidant but not irritable or angry;  refuses use of  Phone but would answer a few questions from a phrase  indicating sleep appetite ok. no pain.  No wish to harm self/others.  denies that others bother him but appears anxious avoidant and internally  preoccupied.  Will continue efforts at more detailed interviewing as the patient becomes less symptomatic and suspicious.
see above hx  ;;2/23: [employer] service ID# [id number] used to interview the patient.  patient continues guarded and avoidant; anxious ; stays in room except for meals because he feels safe here. .  no SI or HI or AVH.  sleep appetite good. no pain issues but responses superficial.   Good eye contact. mother visited him this week end and the visit went well; likes Dr. Ocampo his new doctor who is a fluent Afghan speaker.  Told Dr. Ocampo about past suicide attempts and how he felt he got worse when switched from Prolixin to Risperdal after last discharge.  Today complains about sedation.  Will recheck Lithium level and monitor for akasthisia.  Possibly a passive sx but also note that bp is suggestively elevated (131/84 - 133/93) and should be monitored.  Patient states he needs more time in the hospital to feel better .  Mostly stays in room and avoids groups (but language may be an issue).
see above hx  ;;3/7: seen with Dr. Ocampo;  noted smiling and a more relaxed mood ; observed much more suspicious and guarded when using  Service.  Patient expects to return home tomorrow and assures staff he will take his medications.  Discussed potential  SEs from Trazodone but patient denies any urinary sxs.   No tremor but some restlessness.  Sleep appetite good.  No pain issues.  Can transition to aftercare.  Preference for case management but patient is not agreeable to waiting and wants to return.  Patient's mother visited today and informed Dr. Teixeira that the patient appears improved.
cross cover note.  case discussed with interdisciplinary treatment team.  spoke to pt via  # [id number].  reports feeling ready for discharge today.  He has been compliant with medications and denies side effects. pt denies si/hi/ah/vh.  no evidence of psychosis/ave. he presents calm, cooperative, pleasant.   Sleep and appetite good.  No pain issues.  pt is at low acute risk of harm to self/others at this time.  stable for discharge home today with mother who feels that pt is stable for discharge today.  referral made for case management services to better assist pt in the community.  discharge today with outpt follow up.
see above hx  ;;2/28:  almost smiles; nearly cheerful;  uses fingers to indicate that he slept 6 hours;  no ideas of self harm; appetite good; fair ADLs; good eye contact; observations consistent with resident's observations.
see above hx  ;;3/1:  slept 5 hours; pacing the hallway; smiling and grinning; clearly more manic; fair to poor ADLs but cooperatvie with the writer. bp improving off of Effexor continue to monitor : HR: 76 (01 Mar 2018 09:30) (69 - 76) ; BP: 137/85 (01 Mar 2018 09:30) (137/85 - 147/93)  BP(mean): --  RR: 20 (01 Mar 2018 09:30) (18 - 20)  SpO2: --
see above hx  ;;3/2: patient continues cheerful;  brags with gestures about sleeping better; mood clearly improved; however glass shard in hand which the patient intended to give to staff needs f/u.  At this time no SI or HI or despondency detected.
see above hx  ;3/5: alert; oriented; no tremor; anxious; less labile ; less pacing; no paranoid thoughts expressed; states communicates well to Dr. Ocampo Vincentian speaking resident.  Sleep appetite are good.  No pain issues.  Looks forward to leaving soon.  Aware of remarks in addendum.  Ana are rejected by the patient at this time.
see above hx  ;;2/23: [employer]  service ID# [id number] used to interview the patient.  patient continues guarded and avoidant; anxious ; stays in room except for meals because he feels safe here.  Did allow use of  phone.  no SI or HI or AVH.  sleep appetite good. no pain issues but responses superficial.   Good eye contact. Says wife and mother may visit him this week end.  Gait wnl.  no tremor.

## 2018-03-08 NOTE — PROGRESS NOTE BEHAVIORAL HEALTH - NSBHFUPINTERVALCCFT_PSY_A_CORE
"I am doing well, my mother is picking me up at 2 pm"
"Hi doctor, could you please reduce the dose of my Prolixin?"
"I am feeling better and slept for 7 hours"
"I am feeling better but did not sleep much"
Interviewd  using [employer] in [place]  # [id number]  accepting Norvasc for HTN as  recommended by medicine ; sleep appetite ok; no pain issues; rejects Ana.
Today not complaining of being sleepy; wonders about being on Prolixin; sleep ok; appetite ok; no pain ; interviewed with [employer]  # [id number].   Discussed increasing Klonopin for anxiety.  Patient does not want meds in am.
"I am feeling better but still sleeping about 5-6 hours"  "Which shape are 5 and 10 mg pills?"
"I am feeling very sleepy"
"feeling good"; refused to be interviewed via the [employer]  service; denies pain issues
"not bad"; reports feeling anxious and "low"; denies pain issues; reports good sleep and appetite
"Can I get discharged today?"

## 2018-03-08 NOTE — PROGRESS NOTE BEHAVIORAL HEALTH - NSBHADMITCOORDCAREOTHER_PSY_A_CORE FT
creative arts and psychology staff
creative arts and psychology staff.
creative arts and psychology staff.
creative arts and psychology staff
creative arts and psychology staff
creative arts and psychology staff.
creative arts and psychology staff.
creative arts and psychology staff

## 2018-03-08 NOTE — PROGRESS NOTE BEHAVIORAL HEALTH - NSBHCHARTREVIEWVS_PSY_A_CORE FT
Vital Signs Last 24 Hrs  T(C): 36.5 (08 Mar 2018 06:14), Max: 36.8 (07 Mar 2018 17:00)  T(F): 97.7 (08 Mar 2018 06:14), Max: 98.2 (07 Mar 2018 17:00)  HR: 87 (08 Mar 2018 06:14) (77 - 87)  BP: 152/93 (08 Mar 2018 06:14) (124/86 - 152/93)  BP(mean): --  RR: 18 (08 Mar 2018 06:14) (18 - 18)  SpO2: --

## 2018-03-08 NOTE — PROGRESS NOTE BEHAVIORAL HEALTH - SUMMARY
52M h/o schizoaffective disorder, recent admission on 8Uris for paranoia, returning reporting "high anxiety" as per mother triggered by nonadherence to Prolixin last week.  Patient and mother reporting poor sleep and agitation and requesting psychiatric admission.  Would be suspicious for ongoing delusional thinking given that as per chart patient was not forthcoming during recent admission about delusions.  Patient meets criteria for voluntary psychiatric admission fro observation and stabilization.    ;;02/21: Pt continues to be anxious, requesting to d/c interview with irritable affect, will continue medications and be evaluated later.  ;;02/22: Pt continues to refuse  service for formal interviewing, denies any acute issues and reports feeling good; plan to continue medications  ;;02/23: Pt cooperative and less irritable; agrees to use  service; reports intermittent anxiety; denies AVH ad CORRINA; plan to tanya effexor  2/28: better related and less isolative, less guarded; improving clinically  3/1: better related and more sociable, however with poor boundaries and somewhat illogical  3/2: less irritable and tolerating the increase in prolixin well; still with poor boundaries  3/5: less irritable, less perseverative; positive dynamics  3/7: nearing discharge, stable and improving clinically

## 2018-03-08 NOTE — PROGRESS NOTE BEHAVIORAL HEALTH - NSBHCHARTREVIEWLAB_PSY_A_CORE FT
02-27    139  |  103  |  16  ----------------------------<  107<H>  4.0   |  26  |  0.80    Ca    9.3      27 Feb 2018 07:39    TPro  6.7  /  Alb  3.9  /  TBili  0.6  /  DBili  x   /  AST  13  /  ALT  13  /  AlkPhos  68  02-27    Lithium Level, Serum (02.27.18 @ 07:39)    Lithium Level, Serum: .86 mmoL/L
Hemoglobin A1C, Whole Blood (02.20.18 @ 11:23)    Hemoglobin A1C, Whole Blood: 5.3:     Complete Blood Count + Automated Diff (02.20.18 @ 11:23)    WBC Count: 10.9 K/uL    RBC Count: 4.35 M/uL    Hemoglobin: 12.9 g/dL    Hematocrit: 38.5 %    Mean Cell Volume: 88.5 fL    Mean Cell Hemoglobin: 29.7 pg    Mean Cell Hemoglobin Conc: 33.5 g/dL    Red Cell Distrib Width: 14.6 %    Platelet Count - Automated: 262 K/uL    Auto Neutrophil %: 77.8: Please note that absolute numbers are not reported at [facility]. %    Auto Lymphocyte %: 12.4: Please note that absolute numbers are not reported at [facility]. %    Auto Monocyte %: 8.1: Please note that absolute numbers are not reported at [facility]. %    Auto Eosinophil %: 1.4: Please note that absolute numbers are not reported at [facility]. %    Auto Basophil %: 0.3: Please note that absolute numbers are not reported at [facility]. %
02-27    139  |  103  |  16  ----------------------------<  107<H>  4.0   |  26  |  0.80    Ca    9.3      27 Feb 2018 07:39    TPro  6.7  /  Alb  3.9  /  TBili  0.6  /  DBili  x   /  AST  13  /  ALT  13  /  AlkPhos  68  02-27    Lithium Level, Serum (02.27.18 @ 07:39)    Lithium Level, Serum: .86 mmoL/L
Hemoglobin A1C, Whole Blood (02.20.18 @ 11:23)    Hemoglobin A1C, Whole Blood: 5.3:     Complete Blood Count + Automated Diff (02.20.18 @ 11:23)    WBC Count: 10.9 K/uL    RBC Count: 4.35 M/uL    Hemoglobin: 12.9 g/dL    Hematocrit: 38.5 %    Mean Cell Volume: 88.5 fL    Mean Cell Hemoglobin: 29.7 pg    Mean Cell Hemoglobin Conc: 33.5 g/dL    Red Cell Distrib Width: 14.6 %    Platelet Count - Automated: 262 K/uL    Auto Neutrophil %: 77.8: Please note that absolute numbers are not reported at [facility]. %    Auto Lymphocyte %: 12.4: Please note that absolute numbers are not reported at [facility]. %    Auto Monocyte %: 8.1: Please note that absolute numbers are not reported at [facility]. %    Auto Eosinophil %: 1.4: Please note that absolute numbers are not reported at [facility]. %    Auto Basophil %: 0.3: Please note that absolute numbers are not reported at [facility]. %

## 2018-03-08 NOTE — PROGRESS NOTE BEHAVIORAL HEALTH - ESTIMATED DISCHARGE DATE
28-Feb-2018
09-Mar-2018
28-Feb-2018

## 2018-03-08 NOTE — PROGRESS NOTE BEHAVIORAL HEALTH - NSBHFUPINTERVALHXFT_PSY_A_CORE
Pt seen and interviewed at bedside. Less akathisic and in better behavioral control today s/p cogentin increase. Patient future oriented and willing to comply with medications, anxious about the dosing.   Reports good sleep and appetite, no SE of meds reported, no HA/SOB/diplopia or double vision/no dysuria. No medical concerns and BP is better controlled.  Denied: SI/HI/AVH.   Positive dynamics overall. Patient is currently stable and ready for discharge.
NICO o/n.   Patient again reports improvement in his mood and sleep; appetite is good and pt denies any medical concerns/complaints.  He also has with brighter affect, is more visible on the unit. Patient observed pacing and being more sociable, however still with poor boundaries as evidenced by not keeping appropriate distance from the writer. Still somewhat manic and disorganized, reports regretting cheating on his girlfriends in the past.  Stated that he feels safe and denied any SI/HI/AVH.
NICO o/n. Pt improving clinically and with brighter affect, is cooperative and pleasant, happy to see writer and converse; better related as well however somewhat hypomanic/with elevated mood vs anxious.   Reports wanting to stay on the unit for 2 more weeks - does not specify reasons why. Also states he talked to his family and is planning to return home and follow up with his outpatient psychiatrist.  Recall events of January admission and reports feeling very paranoid at that time, this time with less specific symptoms, but objectively still with evidence of isolating self.  He feels safe and denied any SI/HI/AVH at tis time.   Pt still appeared somewhat anxious, with akathisia but w/o overt evidence of internal stimulation.  Positive dynamics overall.  Li lvls 0.86  Writer speaks Moldovan as a native language.
Overnight per nursing: NICO.  PRNs required: none.  Pt seen and interviewed at bedside. Patient reports feeling very sedated this am and requesting the dose of Prolixin to be reduced this am. Appears brighter and more organized this am. Pt is also future oriented and looking forward to the discharge.   Denied: SI/HI/AVH. No evidence of internal stimulation. Pt still with somewhat poor boundaries but no overt manic symptoms.
less labile;  less wandering of the hallway; still somewhat avoidant; mood improved but less manic.  No behavioral issues.
continues to remain seclusive in room ; but does come out to want to talk about Prolixin.  continues anxious not endorsing SI or HI or AVH but guarded and evasive.  Need to consider transition to a local PHP .   Patient continues anxious and suspicious.  However relates well to Writer and treating resident.  Fair to poor ADLs.  bp continues elevated but increasing Klonopin may have salutary effect; will consult medicine.
NICO o/n. Patient reports improvement in his sleep; although still does not get enough sleep; appetite is good and pt denies any medical concerns/complaints.  Pt now with brighter affect, is more visible on the unit and also pacing and more sociable, especially with Tongan speaking staff, however with poor boundaries. Somewhat manic and disorganized. At times irritable.  Stated that he feels safe and denied any SI/HI/AVH.
[facility]  service ID# [id number] used to interview the patient.    Patient evaluated and cased discussed with the treatment team and attending supervisor. Patient lying comfortably in the bed. Patient presents alert, oriented and superficially cooperative during the interview. Reports feeling "not bad" and intermittent anxiety with anxious affect. Denies any pain issues. Reports good sleep and appetite; delusions and paranoid ideation not verbalized. Denies CORRINA. Pt reports that he feels comfortable staying in the room and at times take walk in the [place] way. Reports wife and son visiting over the weekend. Reports feeling better as compared to the arrival.     As per nursing report, pt is compliant with the medications and instructions from the staff; remains in the room for most of the time. Pt denies any acute medical issues and side effects from the medications. Medicine service consulted for HTN management.
NICO o/n. Pt still keeping mostly to himself but improving clinically.  Pt seen and interviewed at bedside. Patient reports feeling better overall, however states that he is feeling tired and somewhat lethargic this am.  He also states that he feels safer and denied any SI/HI/AVH at tis time. Stated that he has been previously diagnosed with manic-depressive illness and also experienced some AVH - last time 7 years ago.  Pt appeared somewhat anxious, with akathisia but w/o evidence of internal stimulation. No signs or symptoms of ave or withdrawal.  Positive dynamics overall.  Writer speaks [place] Island[place] as a native language.
Patient evaluated and cased discussed with the treatment team and attending supervisor. Patient lying comfortably in the bed. Patient requested not to use the  service during the interview, probably it makes patient anxious and paranoid; presents alert, oriented and superficially cooperative during the interview. Reports feeling "good", with congruent affect. Denies any pain and issues as bothersome. Reports good sleep and appetite; delusions and paranoid ideation not verbalized.    As per nursing report patient is compliant with the medications and instructions from the staff.
Pt seen and interviewed at bedside. Remains somewhat akathisic and internally preoccupied but in good behavioral control and w/o overt psychosis.  Reports good sleep and appetite, no SE of meds reported.  Met with mother today and discussed discharge planning. Patient overall doing better and stable for discharge tomorrow.   Denied: SI/HI/AVH.   Positive dynamics overall.

## 2018-03-08 NOTE — PROGRESS NOTE BEHAVIORAL HEALTH - MODIFICATIONS
Effexor should be tapered in view of peristent paranoid sxs; other meds can be considered; continue Lithium; Klonopin for mood; Prolixin for psychosis
Effexor should be tapered in view of persistent paranoid sxs; other meds can be considered; continue Lithium; Klonopin for mood; Prolixin for psychosis
continue current medications Prolixin for psychosis; Lithium for mood; Trazodone for sleep; Norvasc for HTN
continue on Prolixin and Lithium ;
see below
continue use of Prolixin and Lithium
continue Lithium and oral Prolixin.  Transition to aftercare in view of progress and decrease in paranoid thinking
Effexor should be tapered in view of persistent paranoid sxs; other meds can be considered; continue Lithium; Klonopin for mood; Prolixin for psychosis
raise Prolixin to 20mg for mood (not as paranoid but more manic) and continue titration as needed; continue cogentin for possible EPS; monitor bp;

## 2018-03-08 NOTE — PROGRESS NOTE BEHAVIORAL HEALTH - PRIMARY DX
Schizoaffective disorder, bipolar type

## 2018-03-08 NOTE — PROGRESS NOTE BEHAVIORAL HEALTH - NSBHADMITCOORDWITH_PSY_A_CORE
medical staff/social work/other
other/medical staff/social work
social work/medical staff/other
other/medical staff/social work
other/medical staff/social work
other/social work/medical staff
other/social work/medical staff
medical staff/social work/other

## 2018-03-08 NOTE — PROGRESS NOTE BEHAVIORAL HEALTH - NSBHADMITCOUNSEL_PSY_A_CORE
importance of adherence to chosen treatment/risk factor reduction
importance of adherence to chosen treatment/risk factor reduction
prognosis/instructions for management, treatment and follow up/risk factor reduction/risks and benefits of treatment options/client/family/caregiver education/diagnostic results/impressions and/or recommended studies/importance of adherence to chosen treatment
risk factor reduction/importance of adherence to chosen treatment
importance of adherence to chosen treatment/risk factor reduction
instructions for management, treatment and follow up/risk factor reduction/importance of adherence to chosen treatment/client/family/caregiver education/prognosis/diagnostic results/impressions and/or recommended studies/risks and benefits of treatment options
risk factor reduction/importance of adherence to chosen treatment
risk factor reduction/importance of adherence to chosen treatment
risk factor reduction/client/family/caregiver education/prognosis/risks and benefits of treatment options/importance of adherence to chosen treatment/diagnostic results/impressions and/or recommended studies/instructions for management, treatment and follow up
importance of adherence to chosen treatment/risk factor reduction

## 2018-03-08 NOTE — PROGRESS NOTE BEHAVIORAL HEALTH - NSBHADMITDANGERSELF_PSY_A_CORE
unable to care for self

## 2018-03-08 NOTE — PROGRESS NOTE BEHAVIORAL HEALTH - NSBHADMITIPOBSFT_PSY_A_CORE
able to make needs know

## 2018-03-08 NOTE — PROGRESS NOTE BEHAVIORAL HEALTH - NSBHATTESTSEENBY_PSY_A_CORE
Attending Psychiatrist supervising NP/Trainee, meeting pt...
attending Psychiatrist without NP/Trainee
attending Psychiatrist without NP/Trainee
Attending Psychiatrist supervising NP/Trainee, meeting pt...

## 2018-03-08 NOTE — PROGRESS NOTE BEHAVIORAL HEALTH - PROBLEM SELECTOR PLAN 2
- klonopin 1 mg qhs  - Trazodone 100 mg po qhs for insomnia  - IGM TX
- klonopin 1 mg qhs  - Trazodone 100 mg po qhs for insomnia  - IGM TX
- Effexor 37.5 mg daily   - PRN Benadryl 50 mg  - IGM TX
- klonopin 1 mg qhs  - Trazodone 100 mg po qhs for insomnia  - IGM TX
- PRN Benadryl 50 mg  - IGM TX
- klonopin 1 mg qhs  - Trazodone 100 mg po qhs for insomnia  - IGM TX
- Effexor 150 mg daily   - PRN Benadryl 50 mg  - IGM TX
- klonopin 1 mg qhs  - Trazodone 100 mg po qhs for insomnia  - IGM TX
- PRN Benadryl 50 mg  - IGM TX

## 2018-03-08 NOTE — PROGRESS NOTE BEHAVIORAL HEALTH - NSBHCHARTREVIEWINVESTIGATE_PSY_A_CORE FT
Ventricular Rate 82 BPM    Atrial Rate 82 BPM    P-R Interval 148 ms    QRS Duration 94 ms    Q-T Interval 382 ms    QTC Calculation(Bezet) 446 ms    P Axis 59 degrees    R Axis -15 degrees    T Axis 84 degrees    Diagnosis Line Normal sinus rhythm  Left ventricular hypertrophy with repolarization abnormality
< from: 12 Lead ECG (02.20.18 @ 10:55) >    Ventricular Rate 82 BPM    Atrial Rate 82 BPM    P-R Interval 148 ms    QRS Duration 94 ms    Q-T Interval 382 ms    QTC Calculation(Bezet) 446 ms    P Axis 59 degrees    R Axis -15 degrees    T Axis 84 degrees    Diagnosis Line Normal sinus rhythm  Left ventricular hypertrophy with repolarization abnormality    < end of copied text >
Ventricular Rate 82 BPM    Atrial Rate 82 BPM    P-R Interval 148 ms    QRS Duration 94 ms    Q-T Interval 382 ms    QTC Calculation(Bezet) 446 ms    P Axis 59 degrees    R Axis -15 degrees    T Axis 84 degrees    Diagnosis Line Normal sinus rhythm  Left ventricular hypertrophy with repolarization abnormality
< from: 12 Lead ECG (02.20.18 @ 10:55) >    Ventricular Rate 82 BPM    Atrial Rate 82 BPM    P-R Interval 148 ms    QRS Duration 94 ms    Q-T Interval 382 ms    QTC Calculation(Bezet) 446 ms    P Axis 59 degrees    R Axis -15 degrees    T Axis 84 degrees    Diagnosis Line Normal sinus rhythm  Left ventricular hypertrophy with repolarization abnormality    < end of copied text >

## 2018-03-09 NOTE — PROGRESS NOTE BEHAVIORAL HEALTH - GAIT / STATION
Normal gait / station

## 2018-03-09 NOTE — PROGRESS NOTE BEHAVIORAL HEALTH - BODY HABITUS
Well nourished

## 2018-03-09 NOTE — PROGRESS NOTE BEHAVIORAL HEALTH - BEHAVIOR
Cooperative

## 2018-03-09 NOTE — PROGRESS NOTE BEHAVIORAL HEALTH - PERCEPTIONS
No abnormalities

## 2018-03-09 NOTE — PROGRESS NOTE BEHAVIORAL HEALTH - NSBHPTASSESSDT_PSY_A_CORE
01-Mar-2018 08:58
02-Mar-2018 08:43
05-Mar-2018 08:57
06-Mar-2018 08:30
07-Mar-2018 08:36
08-Mar-2018 08:36
09-Mar-2018 08:50
26-Feb-2018 08:42
28-Feb-2018 09:02
23-Feb-2018 08:29
22-Feb-2018 08:38
27-Feb-2018 09:07

## 2018-03-12 DIAGNOSIS — F25.0 SCHIZOAFFECTIVE DISORDER, BIPOLAR TYPE: ICD-10-CM

## 2018-03-12 DIAGNOSIS — I10 ESSENTIAL (PRIMARY) HYPERTENSION: ICD-10-CM

## 2018-03-12 DIAGNOSIS — Z91.14 PATIENT'S OTHER NONCOMPLIANCE WITH MEDICATION REGIMEN: ICD-10-CM

## 2018-03-12 DIAGNOSIS — F41.9 ANXIETY DISORDER, UNSPECIFIED: ICD-10-CM

## 2018-03-18 ENCOUNTER — INPATIENT (INPATIENT)
Facility: HOSPITAL | Age: 53
LOS: 10 days | Discharge: HOME | DRG: 885 | End: 2018-03-29
Attending: PSYCHIATRY & NEUROLOGY | Admitting: PSYCHIATRY & NEUROLOGY
Payer: MEDICAID

## 2018-03-18 VITALS
TEMPERATURE: 98 F | OXYGEN SATURATION: 98 % | RESPIRATION RATE: 18 BRPM | DIASTOLIC BLOOD PRESSURE: 91 MMHG | SYSTOLIC BLOOD PRESSURE: 164 MMHG | HEART RATE: 86 BPM

## 2018-03-18 DIAGNOSIS — Z98.890 OTHER SPECIFIED POSTPROCEDURAL STATES: Chronic | ICD-10-CM

## 2018-03-18 LAB
AMPHET UR-MCNC: NEGATIVE — SIGNIFICANT CHANGE UP
ANION GAP SERPL CALC-SCNC: 12 MMOL/L — SIGNIFICANT CHANGE UP (ref 5–17)
APPEARANCE UR: CLEAR — SIGNIFICANT CHANGE UP
BARBITURATES UR SCN-MCNC: NEGATIVE — SIGNIFICANT CHANGE UP
BASOPHILS NFR BLD AUTO: 0.4 % — SIGNIFICANT CHANGE UP (ref 0–2)
BENZODIAZ UR-MCNC: NEGATIVE — SIGNIFICANT CHANGE UP
BILIRUB UR-MCNC: NEGATIVE — SIGNIFICANT CHANGE UP
BUN SERPL-MCNC: 12 MG/DL — SIGNIFICANT CHANGE UP (ref 7–23)
CALCIUM SERPL-MCNC: 9.6 MG/DL — SIGNIFICANT CHANGE UP (ref 8.4–10.5)
CHLORIDE SERPL-SCNC: 102 MMOL/L — SIGNIFICANT CHANGE UP (ref 96–108)
CO2 SERPL-SCNC: 26 MMOL/L — SIGNIFICANT CHANGE UP (ref 22–31)
COCAINE METAB.OTHER UR-MCNC: NEGATIVE — SIGNIFICANT CHANGE UP
COLOR SPEC: YELLOW — SIGNIFICANT CHANGE UP
CREAT SERPL-MCNC: 0.71 MG/DL — SIGNIFICANT CHANGE UP (ref 0.5–1.3)
DIFF PNL FLD: NEGATIVE — SIGNIFICANT CHANGE UP
EOSINOPHIL NFR BLD AUTO: 0.8 % — SIGNIFICANT CHANGE UP (ref 0–6)
ETHANOL SERPL-MCNC: <10 MG/DL — SIGNIFICANT CHANGE UP (ref 0–10)
GLUCOSE SERPL-MCNC: 122 MG/DL — HIGH (ref 70–99)
GLUCOSE UR QL: NEGATIVE — SIGNIFICANT CHANGE UP
HCT VFR BLD CALC: 40.2 % — SIGNIFICANT CHANGE UP (ref 39–50)
HGB BLD-MCNC: 13.4 G/DL — SIGNIFICANT CHANGE UP (ref 13–17)
KETONES UR-MCNC: NEGATIVE — SIGNIFICANT CHANGE UP
LEUKOCYTE ESTERASE UR-ACNC: NEGATIVE — SIGNIFICANT CHANGE UP
LYMPHOCYTES # BLD AUTO: 13.9 % — SIGNIFICANT CHANGE UP (ref 13–44)
MCHC RBC-ENTMCNC: 30.2 PG — SIGNIFICANT CHANGE UP (ref 27–34)
MCHC RBC-ENTMCNC: 33.3 G/DL — SIGNIFICANT CHANGE UP (ref 32–36)
MCV RBC AUTO: 90.7 FL — SIGNIFICANT CHANGE UP (ref 80–100)
METHADONE UR-MCNC: NEGATIVE — SIGNIFICANT CHANGE UP
MONOCYTES NFR BLD AUTO: 7.8 % — SIGNIFICANT CHANGE UP (ref 2–14)
NEUTROPHILS NFR BLD AUTO: 77.1 % — HIGH (ref 43–77)
NITRITE UR-MCNC: NEGATIVE — SIGNIFICANT CHANGE UP
OPIATES UR-MCNC: NEGATIVE — SIGNIFICANT CHANGE UP
PCP SPEC-MCNC: SIGNIFICANT CHANGE UP
PCP UR-MCNC: NEGATIVE — SIGNIFICANT CHANGE UP
PH UR: 7 — SIGNIFICANT CHANGE UP (ref 5–8)
PLATELET # BLD AUTO: 299 K/UL — SIGNIFICANT CHANGE UP (ref 150–400)
POTASSIUM SERPL-MCNC: 3.8 MMOL/L — SIGNIFICANT CHANGE UP (ref 3.5–5.3)
POTASSIUM SERPL-SCNC: 3.8 MMOL/L — SIGNIFICANT CHANGE UP (ref 3.5–5.3)
PROT UR-MCNC: NEGATIVE MG/DL — SIGNIFICANT CHANGE UP
RBC # BLD: 4.43 M/UL — SIGNIFICANT CHANGE UP (ref 4.2–5.8)
RBC # FLD: 14.2 % — SIGNIFICANT CHANGE UP (ref 10.3–16.9)
SODIUM SERPL-SCNC: 140 MMOL/L — SIGNIFICANT CHANGE UP (ref 135–145)
SP GR SPEC: 1.01 — SIGNIFICANT CHANGE UP (ref 1–1.03)
THC UR QL: NEGATIVE — SIGNIFICANT CHANGE UP
UROBILINOGEN FLD QL: 0.2 E.U./DL — SIGNIFICANT CHANGE UP
WBC # BLD: 10.8 K/UL — HIGH (ref 3.8–10.5)
WBC # FLD AUTO: 10.8 K/UL — HIGH (ref 3.8–10.5)

## 2018-03-18 PROCEDURE — 93010 ELECTROCARDIOGRAM REPORT: CPT

## 2018-03-18 PROCEDURE — 99285 EMERGENCY DEPT VISIT HI MDM: CPT | Mod: 25

## 2018-03-18 RX ORDER — PROPRANOLOL HCL 160 MG
40 CAPSULE, EXTENDED RELEASE 24HR ORAL DAILY
Qty: 0 | Refills: 0 | Status: DISCONTINUED | OUTPATIENT
Start: 2018-03-18 | End: 2018-03-18

## 2018-03-18 RX ORDER — PROPRANOLOL HCL 160 MG
20 CAPSULE, EXTENDED RELEASE 24HR ORAL ONCE
Qty: 0 | Refills: 0 | Status: COMPLETED | OUTPATIENT
Start: 2018-03-18 | End: 2018-03-18

## 2018-03-18 RX ORDER — DIPHENHYDRAMINE HCL 50 MG
50 CAPSULE ORAL EVERY 6 HOURS
Qty: 0 | Refills: 0 | Status: DISCONTINUED | OUTPATIENT
Start: 2018-03-18 | End: 2018-03-29

## 2018-03-18 RX ORDER — PROPRANOLOL HCL 160 MG
10 CAPSULE, EXTENDED RELEASE 24HR ORAL
Qty: 0 | Refills: 0 | Status: DISCONTINUED | OUTPATIENT
Start: 2018-03-19 | End: 2018-03-29

## 2018-03-18 RX ORDER — FLUPHENAZINE HYDROCHLORIDE 1 MG/1
5 TABLET, FILM COATED ORAL DAILY
Qty: 0 | Refills: 0 | Status: DISCONTINUED | OUTPATIENT
Start: 2018-03-18 | End: 2018-03-26

## 2018-03-18 RX ORDER — FLUPHENAZINE HYDROCHLORIDE 1 MG/1
15 TABLET, FILM COATED ORAL AT BEDTIME
Qty: 0 | Refills: 0 | Status: DISCONTINUED | OUTPATIENT
Start: 2018-03-18 | End: 2018-03-29

## 2018-03-18 RX ORDER — TRAZODONE HCL 50 MG
100 TABLET ORAL AT BEDTIME
Qty: 0 | Refills: 0 | Status: DISCONTINUED | OUTPATIENT
Start: 2018-03-18 | End: 2018-03-22

## 2018-03-18 RX ORDER — PROPRANOLOL HCL 160 MG
10 CAPSULE, EXTENDED RELEASE 24HR ORAL
Qty: 0 | Refills: 0 | Status: DISCONTINUED | OUTPATIENT
Start: 2018-03-18 | End: 2018-03-18

## 2018-03-18 RX ORDER — LITHIUM CARBONATE 300 MG/1
450 TABLET, EXTENDED RELEASE ORAL DAILY
Qty: 0 | Refills: 0 | Status: DISCONTINUED | OUTPATIENT
Start: 2018-03-18 | End: 2018-03-22

## 2018-03-18 RX ORDER — LITHIUM CARBONATE 300 MG/1
600 TABLET, EXTENDED RELEASE ORAL AT BEDTIME
Qty: 0 | Refills: 0 | Status: DISCONTINUED | OUTPATIENT
Start: 2018-03-18 | End: 2018-03-22

## 2018-03-18 RX ADMIN — Medication 50 MILLIGRAM(S): at 21:18

## 2018-03-18 RX ADMIN — FLUPHENAZINE HYDROCHLORIDE 15 MILLIGRAM(S): 1 TABLET, FILM COATED ORAL at 21:35

## 2018-03-18 RX ADMIN — Medication 100 MILLIGRAM(S): at 21:34

## 2018-03-18 RX ADMIN — LITHIUM CARBONATE 600 MILLIGRAM(S): 300 TABLET, EXTENDED RELEASE ORAL at 21:38

## 2018-03-18 RX ADMIN — Medication 20 MILLIGRAM(S): at 16:12

## 2018-03-18 NOTE — ED BEHAVIORAL HEALTH ASSESSMENT NOTE - SUMMARY
52M h/o schizoaffective disorder, recent admsisions on 8Uris for paranoia, returning reporting "high anxiety" as per mother triggered by cessation of Klonipin on 3/8/2018.   Patient and mother reporting poor sleep, restless agitation and requesting psychiatric admission.  Would be suspicious for ongoing delusional thinking given that as per chart patient was not forthcomign during recent admission about delusions.  Patient meets criteria for voluntary psychiatric admission for observation and stabilization and weaning of Klonipin and evaluation of akathisia .    Plan  Admit Voluntary to 8 uris  Restart home meds from last admission  Start propranolol 20 mg for akathisia  DO NOT RESTART Klonipin

## 2018-03-18 NOTE — ED BEHAVIORAL HEALTH ASSESSMENT NOTE - DESCRIPTION
Patient to be cleared medically prior to admission. HTN, on meds last year but not since Novemeber From [place].  Lives with mother, son, and son's wife.

## 2018-03-18 NOTE — ED BEHAVIORAL HEALTH ASSESSMENT NOTE - RISK ASSESSMENT
Patient at chronically elevated risk of self harm given prior SI, acutely anxious and agitated, poor stress tolerance and coping mechanisms

## 2018-03-18 NOTE — ED ADULT NURSE NOTE - OBJECTIVE STATEMENT
pt to ER w/ report of anxiety after stopping taking Klonopin on 3/10/18.  Breathing unlabored, skin warm and dry. Mother at bedside.  Will continue to monitor. pt to ER w/ report of anxiety after stopping taking Klonopin on 3/8/18.  Pt states he cannot stop moving his legs in agitation.  Pt denies cp/sob/f/c/n/v.  Pt requesting in-patient psych admission.  Breathing unlabored, skin warm and dry. Mother at bedside.  Will continue to monitor. pt to ER w/ report of anxiety after stopping taking Klonopin on 3/8/18.  Pt states he cannot stop moving his legs in agitation.  Pt denies SI/HI.  Pt denies cp/sob/f/c/n/v.  Pt requesting in-patient psych admission.  Breathing unlabored, skin warm and dry. Mother at bedside.  Will continue to monitor.

## 2018-03-18 NOTE — ED BEHAVIORAL HEALTH ASSESSMENT NOTE - DETAILS
[facility] 1/23-2/5/18, 2/20-3/9 Reports prior SI but none recently Dr. Cardenas, [facility] patient self referred Dr. Cardenas

## 2018-03-18 NOTE — ED BEHAVIORAL HEALTH ASSESSMENT NOTE - OTHER PAST PSYCHIATRIC HISTORY (INCLUDE DETAILS REGARDING ONSET, COURSE OF ILLNESS, INPATIENT/OUTPATIENT TREATMENT)
See HPI.  Currently seeing Dr. Elkins.  Discharged from [facility] on Prolixin, lithium, Klonopin, trazodone, Effexor, cogentin.

## 2018-03-18 NOTE — ED BEHAVIORAL HEALTH ASSESSMENT NOTE - HPI (INCLUDE ILLNESS QUALITY, SEVERITY, DURATION, TIMING, CONTEXT, MODIFYING FACTORS, ASSOCIATED SIGNS AND SYMPTOMS)
[facility]  [id number]:      52M Australian speaking w/ h/o schizoaffective disorder, bipolar type, multiple psych admissions most recently at [facility] d/yousuf 3/5/18, for paranoia, PMH HTN not on meds, BIB self with mother for "high anxiety."  Patient is poor historian, oddly related.  Reports that he felt better for a few days after recent discharge but then started having nervous, fidgety agitation, and uncontrollable movements since 3/8 which coencided with cessation of klonipin.  Patient has been reported as "walking all over the place" and had a notable history of wandering and walking with medical consequences of extreme exertion when psychotic and agitated.     Mother at bedside.  Better historian but also slightly unclear.  Reports patient has been uncontrollably nervous and "agitated" since cessation of klonipin.  Patient and mother are both resistant to the notion of trial of propranolol in the ED to prevent admission and both indicate feeling overwhelmed and unsafe if the patient was to be released.  Patient and mother request voluntary admission and could not be convinced of the prudence of avoiding the regressive effects of readmission.      Left message for Dr. Elkins [phone].

## 2018-03-19 DIAGNOSIS — F25.0 SCHIZOAFFECTIVE DISORDER, BIPOLAR TYPE: ICD-10-CM

## 2018-03-19 LAB
CHOLEST SERPL-MCNC: 170 MG/DL — SIGNIFICANT CHANGE UP (ref 10–199)
HBA1C BLD-MCNC: 5.1 % — SIGNIFICANT CHANGE UP (ref 4–5.6)
HDLC SERPL-MCNC: 65 MG/DL — SIGNIFICANT CHANGE UP (ref 40–125)
LIPID PNL WITH DIRECT LDL SERPL: 90 MG/DL — SIGNIFICANT CHANGE UP
LITHIUM SERPL-MCNC: 0.51 MMOL/L — LOW (ref 0.6–1.2)
TOTAL CHOLESTEROL/HDL RATIO MEASUREMENT: 2.6 RATIO — LOW (ref 3.4–9.6)
TRIGL SERPL-MCNC: 74 MG/DL — SIGNIFICANT CHANGE UP (ref 10–149)

## 2018-03-19 PROCEDURE — 99233 SBSQ HOSP IP/OBS HIGH 50: CPT

## 2018-03-19 RX ORDER — CLONAZEPAM 1 MG
0.5 TABLET ORAL
Qty: 0 | Refills: 0 | Status: DISCONTINUED | OUTPATIENT
Start: 2018-03-19 | End: 2018-03-21

## 2018-03-19 RX ORDER — HYDROXYZINE HCL 10 MG
25 TABLET ORAL ONCE
Qty: 0 | Refills: 0 | Status: COMPLETED | OUTPATIENT
Start: 2018-03-19 | End: 2018-03-19

## 2018-03-19 RX ADMIN — Medication 10 MILLIGRAM(S): at 10:27

## 2018-03-19 RX ADMIN — Medication 100 MILLIGRAM(S): at 21:51

## 2018-03-19 RX ADMIN — Medication 10 MILLIGRAM(S): at 21:51

## 2018-03-19 RX ADMIN — LITHIUM CARBONATE 450 MILLIGRAM(S): 300 TABLET, EXTENDED RELEASE ORAL at 10:27

## 2018-03-19 RX ADMIN — Medication 25 MILLIGRAM(S): at 20:14

## 2018-03-19 RX ADMIN — FLUPHENAZINE HYDROCHLORIDE 5 MILLIGRAM(S): 1 TABLET, FILM COATED ORAL at 10:27

## 2018-03-19 RX ADMIN — LITHIUM CARBONATE 600 MILLIGRAM(S): 300 TABLET, EXTENDED RELEASE ORAL at 21:51

## 2018-03-19 RX ADMIN — Medication 0.5 MILLIGRAM(S): at 21:51

## 2018-03-19 RX ADMIN — FLUPHENAZINE HYDROCHLORIDE 15 MILLIGRAM(S): 1 TABLET, FILM COATED ORAL at 21:51

## 2018-03-19 NOTE — BEHAVIORAL HEALTH ASSESSMENT NOTE - DIFFERENTIAL
schizoaffective disorder, dependent PD, secondary gain, drug seeking behavior Schizoaffective disorder, benzodiazepine dependence, secondary gain, r/o dependent personality disorder

## 2018-03-19 NOTE — BEHAVIORAL HEALTH ASSESSMENT NOTE - NSBHCHARTREVIEWLAB_PSY_A_CORE FT
13.4   10.8  )-----------( 299      ( 18 Mar 2018 13:42 )             40.2   03-18    140  |  102  |  12  ----------------------------<  122<H>  3.8   |  26  |  0.71    Ca    9.6      18 Mar 2018 13:42

## 2018-03-19 NOTE — BEHAVIORAL HEALTH ASSESSMENT NOTE - NSBHCHARTREVIEWINVESTIGATE_PSY_A_CORE FT
Ventricular Rate 85 BPM    Atrial Rate 85 BPM    P-R Interval 142 ms    QRS Duration 90 ms    Q-T Interval 400 ms    QTC Calculation(Bezet) 476 ms    P Axis 46 degrees    R Axis -12 degrees    T Axis 76 degrees    Diagnosis Line Normal sinus rhythm  Possible Left atrial enlargement  Left ventricular hypertrophy  Nonspecific T wave abnormality  Prolonged QT    Confirmed by Alex Victoria ([id number]) on 3/19/2018 2:17:06 PM

## 2018-03-19 NOTE — BEHAVIORAL HEALTH ASSESSMENT NOTE - CASE SUMMARY
52M Tajik speaking w/ h/o schizoaffective disorder, bipolar type, multiple psych admissions most recently at [facility] d/yousuf 3/5/18, for paranoia, PMH HTN not on meds, BIB self with mother for "high anxiety."  Patient is poor historian, oddly related.  Reports that he felt better for a few days after recent discharge but then started having nervous, fidgety agitation, and uncontrollable movements since 3/8 which coencided with cessation of klonipin.  Patient has been reported as "walking all over the place" and had a notable history of wandering and walking with medical consequences of extreme exertion when psychotic and agitated.    ;;3/19: alert; oriented x3; registers 3/3 but seemed uninterested in answering routine questions for cognitive assessment and was not able to cooperate with neuro screening using [facility]  ( #[id number] in [place]) .  Nonetheless speech wnl; no s/h i/i/p a little guarded; more restless pacing the hallway asking for Klonopin.  Superficially polite.  No avh.  ij: poor but acknowledges need for tx.

## 2018-03-19 NOTE — BEHAVIORAL HEALTH ASSESSMENT NOTE - NSBHADMITIPSTRENGTH_PSY_A_CORE
Has supportive interpersonal relationships with family, friends or peers/Has access to housing/residential stability/Cooperative with treatment

## 2018-03-19 NOTE — BEHAVIORAL HEALTH ASSESSMENT NOTE - RISK ASSESSMENT
Patient at chronically elevated risk of self harm given prior SI, acutely anxious and agitated, poor stress tolerance and coping mechanisms Chronic schizoaffective, multiple hospitalizations, older  male, acute agitation

## 2018-03-19 NOTE — BEHAVIORAL HEALTH ASSESSMENT NOTE - NSBHCHARTREVIEWVS_PSY_A_CORE FT
Vital Signs Last 24 Hrs  T(C): 36.9 (19 Mar 2018 16:56), Max: 36.9 (19 Mar 2018 16:56)  T(F): 98.4 (19 Mar 2018 16:56), Max: 98.4 (19 Mar 2018 16:56)  HR: 82 (19 Mar 2018 16:56) (81 - 92)  BP: 148/98 (19 Mar 2018 16:56) (141/93 - 164/105)  BP(mean): --  RR: 20 (19 Mar 2018 09:10) (18 - 20)  SpO2: --

## 2018-03-19 NOTE — BEHAVIORAL HEALTH ASSESSMENT NOTE - PROBLEM SELECTOR PLAN 1
-Restarted Klonopin 0.5 mg BID  -Continue Prolixin 5 mg qAM and 15 mg qHS  -Continue Lithium 450 mg qAM and 600 mg qHS. Li level was slightly low at 0.51  -Continue Trazodone 100 mg  -Continue Propranolol 10 mg BID for akathisia

## 2018-03-19 NOTE — BEHAVIORAL HEALTH ASSESSMENT NOTE - SUMMARY
52M h/o schizoaffective disorder, recent admsisions on 8Uris for paranoia, returning reporting "high anxiety" as per mother triggered by cessation of Klonipin on 3/8/2018.   Patient and mother reporting poor sleep, restless agitation and requesting psychiatric admission.  Would be suspicious for ongoing delusional thinking given that as per chart patient was not forthcomign during recent admission about delusions.  Patient meets criteria for voluntary psychiatric admission for observation and stabilization and weaning of Klonipin and evaluation of akathisia .    Plan  Admit Voluntary to 8 uris  Restart home meds from last admission  Start propranolol 20 mg for akathisia  DO NOT RESTART Klonipin 51 yo [place] male with PPH of schizoaffective disorder, bipolar type, and multiple recent admissions at [facility] for paranoia, now presents with increased anxiety and agitation triggered by recent discontinuation of Klonopin after prior discharge on 3/8/18. Patient remains anxious and perseverative on Klonopin. Due to concern for akathisia, propranolol was started. Klonopin was also restarted at a low dose. Patient has poor insight. He requires inpatient hospitalization for medication management, symptom stabilization, and safety.

## 2018-03-19 NOTE — BEHAVIORAL HEALTH ASSESSMENT NOTE - HPI (INCLUDE ILLNESS QUALITY, SEVERITY, DURATION, TIMING, CONTEXT, MODIFYING FACTORS, ASSOCIATED SIGNS AND SYMPTOMS)
As per admitting physician:    "[facility]  [id number]:    52M Belarusian speaking w/ h/o schizoaffective disorder, bipolar type, multiple psych admissions most recently at [facility] d/yousuf 3/5/18, for paranoia, PMH HTN not on meds, BIB self with mother for "high anxiety."  Patient is poor historian, oddly related.  Reports that he felt better for a few days after recent discharge but then started having nervous, fidgety agitation, and uncontrollable movements since 3/8 which coencided with cessation of klonipin.  Patient has been reported as "walking all over the place" and had a notable history of wandering and walking with medical consequences of extreme exertion when psychotic and agitated.   Mother at bedside.  Better historian but also slightly unclear.  Reports patient has been uncontrollably nervous and "agitated" since cessation of klonipin.  Patient and mother are both resistant to the notion of trial of propranolol in the ED to prevent admission and both indicate feeling overwhelmed and unsafe if the patient was to be released.  Patient and mother request voluntary admission and could not be convinced of the prudence of avoiding the regressive effects of readmission.    Left message for Dr. Elkins [phone]."    On evaluation with the treatment team:  As per nursing report, patient was calm and cooperative upon arrival to the unit. He was given Propranolol 20 mg in ED. His blood pressure has been elevated at 164/105 and 141/93 on repeat. Collateral was obtained from the patient's psychiatrist, Dr. Ladd, who stated that he saw the patient a few days prior to admission. He was anxious and restless at the time and reported that he cannot sleep. Klonopin was not restarted during this visit. He confirmed that the patient has been compliant with Prolixin since discharged from [facility] almost 2 weeks ago. He also stated that he has consistently been a poor historian even when interviewed entirely in Belarusian. As per admitting physician:    "[facility]  [id number]:    52M [place]in[place] speaking w/ h/o schizoaffective disorder, bipolar type, multiple psych admissions most recently at [facility] d/yousuf 3/5/18, for paranoia, PMH HTN not on meds, BIB self with mother for "high anxiety."  Patient is poor historian, oddly related.  Reports that he felt better for a few days after recent discharge but then started having nervous, fidgety agitation, and uncontrollable movements since 3/8 which coencided with cessation of klonipin.  Patient has been reported as "walking all over the place" and had a notable history of wandering and walking with medical consequences of extreme exertion when psychotic and agitated.   Mother at bedside.  Better historian but also slightly unclear.  Reports patient has been uncontrollably nervous and "agitated" since cessation of klonipin.  Patient and mother are both resistant to the notion of trial of propranolol in the ED to prevent admission and both indicate feeling overwhelmed and unsafe if the patient was to be released.  Patient and mother request voluntary admission and could not be convinced of the prudence of avoiding the regressive effects of readmission.    Left message for Dr. Elkins [phone]."    On evaluation with the treatment team:  As per nursing report, patient was calm and cooperative upon arrival to the unit. He was given Propranolol 20 mg in ED. His blood pressure has been elevated at 164/105 and 141/93 on repeat. Collateral was obtained from the patient's psychiatrist, Dr. Ladd, who stated that he saw the patient a few days prior to admission. He was anxious and restless at the time and reported that he cannot sleep. Klonopin was not restarted during this visit. He confirmed that the patient has been compliant with Prolixin since discharged from [facility] almost 2 weeks ago. He also stated that he has consistently been a poor historian even when interviewed entirely in [place]dein[place]. The patient refused to speak to this writer, even after multiple attempts. He has been observed to be pacing the unit and continues to perseverate on obtaining Klonopin. He appears guarded and internally preoccupied, often muttering to himself.

## 2018-03-19 NOTE — BEHAVIORAL HEALTH ASSESSMENT NOTE - NSBHADMITCOUNSEL_PSY_A_CORE
risk factor reduction/diagnostic results/impressions and/or recommended studies/risks and benefits of treatment options/importance of adherence to chosen treatment/client/family/caregiver education/instructions for management, treatment and follow up/prognosis

## 2018-03-20 PROCEDURE — 99233 SBSQ HOSP IP/OBS HIGH 50: CPT

## 2018-03-20 RX ADMIN — Medication 0.5 MILLIGRAM(S): at 21:33

## 2018-03-20 RX ADMIN — Medication 100 MILLIGRAM(S): at 21:33

## 2018-03-20 RX ADMIN — FLUPHENAZINE HYDROCHLORIDE 15 MILLIGRAM(S): 1 TABLET, FILM COATED ORAL at 21:33

## 2018-03-20 RX ADMIN — Medication 10 MILLIGRAM(S): at 21:33

## 2018-03-20 RX ADMIN — LITHIUM CARBONATE 600 MILLIGRAM(S): 300 TABLET, EXTENDED RELEASE ORAL at 21:33

## 2018-03-20 RX ADMIN — LITHIUM CARBONATE 450 MILLIGRAM(S): 300 TABLET, EXTENDED RELEASE ORAL at 10:10

## 2018-03-20 RX ADMIN — Medication 0.5 MILLIGRAM(S): at 10:10

## 2018-03-20 RX ADMIN — Medication 10 MILLIGRAM(S): at 10:10

## 2018-03-20 RX ADMIN — FLUPHENAZINE HYDROCHLORIDE 5 MILLIGRAM(S): 1 TABLET, FILM COATED ORAL at 10:10

## 2018-03-20 NOTE — PROGRESS NOTE BEHAVIORAL HEALTH - SUMMARY
xsxx  52 year old Northern Irish speaking male returns to [facility] for the third time ; this time because of  "high anxiety"  noted to be pacing and anxious about Klonopin was stopped as outpatient;  continued to take Prolixn for paranoid psychotic sxs and Lithium for mood. .  ;3/20: less pacing; mood less anxious; compliant with low dose Klonpin confirmed via i-stop; also learned that patient was compliant with po Prolixin.  Possibiity of akathisia engterntained as increasing agitation and anxiety when Klonopin stopped as outpatient.  Patient typlically stays in room most of time and is guarded and somewaht evasive.

## 2018-03-20 NOTE — PROGRESS NOTE BEHAVIORAL HEALTH - NSBHFUPINTERVALHXFT_PSY_A_CORE
MSE:  would sit up in bed.  fair eye contact; clean; spends time in the hallway.   cognition is intact; denies AVH or s/h i/i/p.  speech normal volume, spontaneous, clear; affect mostly thought congruent; no strangeness of language use.  i&j: fair to poor.  accepts treatment; however not reflective on sxs or situation.

## 2018-03-20 NOTE — PROGRESS NOTE BEHAVIORAL HEALTH - NSBHCHARTREVIEWVS_PSY_A_CORE FT
Vital Signs Last 24 Hrs  T(C): 37.1 (20 Mar 2018 10:00), Max: 37.1 (20 Mar 2018 10:00)  T(F): 98.8 (20 Mar 2018 10:00), Max: 98.8 (20 Mar 2018 10:00)  HR: 71 (20 Mar 2018 10:00) (71 - 82)  BP: 163/103 (20 Mar 2018 10:00) (148/98 - 163/103)  BP(mean): --  RR: 20 (20 Mar 2018 10:00) (20 - 20)  SpO2: --

## 2018-03-21 DIAGNOSIS — I10 ESSENTIAL (PRIMARY) HYPERTENSION: ICD-10-CM

## 2018-03-21 PROCEDURE — 99233 SBSQ HOSP IP/OBS HIGH 50: CPT

## 2018-03-21 RX ORDER — NICOTINE POLACRILEX 2 MG
2 GUM BUCCAL
Qty: 0 | Refills: 0 | Status: DISCONTINUED | OUTPATIENT
Start: 2018-03-21 | End: 2018-03-29

## 2018-03-21 RX ORDER — CLONAZEPAM 1 MG
0.5 TABLET ORAL AT BEDTIME
Qty: 0 | Refills: 0 | Status: DISCONTINUED | OUTPATIENT
Start: 2018-03-21 | End: 2018-03-22

## 2018-03-21 RX ORDER — CLONAZEPAM 1 MG
0.5 TABLET ORAL EVERY 12 HOURS
Qty: 0 | Refills: 0 | Status: DISCONTINUED | OUTPATIENT
Start: 2018-03-21 | End: 2018-03-27

## 2018-03-21 RX ORDER — AMLODIPINE BESYLATE 2.5 MG/1
5 TABLET ORAL DAILY
Qty: 0 | Refills: 0 | Status: DISCONTINUED | OUTPATIENT
Start: 2018-03-21 | End: 2018-03-23

## 2018-03-21 RX ADMIN — Medication 10 MILLIGRAM(S): at 21:40

## 2018-03-21 RX ADMIN — Medication 0.5 MILLIGRAM(S): at 10:14

## 2018-03-21 RX ADMIN — Medication 0.5 MILLIGRAM(S): at 21:41

## 2018-03-21 RX ADMIN — AMLODIPINE BESYLATE 5 MILLIGRAM(S): 2.5 TABLET ORAL at 12:50

## 2018-03-21 RX ADMIN — FLUPHENAZINE HYDROCHLORIDE 5 MILLIGRAM(S): 1 TABLET, FILM COATED ORAL at 10:15

## 2018-03-21 RX ADMIN — Medication 10 MILLIGRAM(S): at 10:15

## 2018-03-21 RX ADMIN — Medication 100 MILLIGRAM(S): at 21:41

## 2018-03-21 RX ADMIN — FLUPHENAZINE HYDROCHLORIDE 15 MILLIGRAM(S): 1 TABLET, FILM COATED ORAL at 21:41

## 2018-03-21 RX ADMIN — LITHIUM CARBONATE 600 MILLIGRAM(S): 300 TABLET, EXTENDED RELEASE ORAL at 21:41

## 2018-03-21 RX ADMIN — LITHIUM CARBONATE 450 MILLIGRAM(S): 300 TABLET, EXTENDED RELEASE ORAL at 10:15

## 2018-03-21 NOTE — PROGRESS NOTE BEHAVIORAL HEALTH - NSBHFUPINTERVALHXFT_PSY_A_CORE
Patient reports normal mood and denies any paranoid ideation or SI/HI/AVH or any  other thought or perceptual disturbances.   His only concern is akathisia, especially in the evening. Unlikely abuse of bezos since the patient is only requesting klonopin to be dosed in the evening since the am and daytime Klonopin are very sedating per patient.  Medical ROS negative this am.   Pt seen with attending psychiatrist Dr Hutchison - no need for translation services at this time.

## 2018-03-21 NOTE — PROGRESS NOTE BEHAVIORAL HEALTH - NSBHCHARTREVIEWVS_PSY_A_CORE FT
Vital Signs Last 24 Hrs  T(C): 37 (20 Mar 2018 17:25), Max: 37.1 (20 Mar 2018 10:00)  T(F): 98.6 (20 Mar 2018 17:25), Max: 98.8 (20 Mar 2018 10:00)  HR: 67 (20 Mar 2018 17:25) (67 - 71)  BP: 150/98 (20 Mar 2018 17:25) (150/98 - 163/103)  BP(mean): --  RR: 18 (20 Mar 2018 17:25) (18 - 20)  SpO2: -- Vital Signs Last 24 Hrs  T(C): 36.1 (21 Mar 2018 09:24), Max: 37 (20 Mar 2018 17:25)  T(F): 97 (21 Mar 2018 09:24), Max: 98.6 (20 Mar 2018 17:25)  HR: 69 (21 Mar 2018 13:44) (67 - 76)  BP: 143/89 (21 Mar 2018 13:44) (143/89 - 178/117)  BP(mean): --  RR: 20 (21 Mar 2018 13:44) (18 - 20)  SpO2: --

## 2018-03-21 NOTE — PROGRESS NOTE BEHAVIORAL HEALTH - CASE SUMMARY
52 year old Sudanese speaking male returns to [facility] for the third time ; this time because of  "high anxiety"  noted to be pacing and anxious about Klonopin was stopped as outpatient;  continued to take Prolixn for paranoid psychotic sxs and Lithium for mood. .  ;3/20: less pacing; mood less anxious; compliant with low dose Klonpin confirmed via i-stop; also learned that patient was compliant with po Prolixin.  Possibiity of akathisia entertained as increasing agitation and anxiety when Klonopin stopped as outpatient.  Patient typically stays in room most of time and is guarded and somewhat evasive.  ;;3/21: seen with Dr. Ocampo (primary language is Sudanese); patient sleeps well, appetite good, no pain issue.  Does not want Klonopin in the morning but willing to take it in the evening.  Consent to speak to past treater to understand why Klonopin was d/c'd.   Anxious but less so.  No prominence of paranoid thinking .  Not pacing as much.  Less labile or irritable. 52 year old Guinean speaking male returns to [facility] for the third time ; this time because of  "high anxiety"  noted to be pacing and anxious about Klonopin was stopped as outpatient;  continued to take Prolixn for paranoid psychotic sxs and Lithium for mood. ;.  ;;3/21: seen with Dr. Ocampo (primary language is Guinean); patient sleeps well, appetite good, no pain issue.  Does not want Klonopin in the morning but willing to take it in the evening.  Consent to speak to past treater to understand why Klonopin was d/c'd.   Anxious but less so.  No prominence of paranoid thinking .  Not pacing as much.  Less labile or irritable.

## 2018-03-21 NOTE — PROGRESS NOTE BEHAVIORAL HEALTH - NSBHCHARTREVIEWINVESTIGATE_PSY_A_CORE FT
Ventricular Rate 85 BPM    Atrial Rate 85 BPM    P-R Interval 142 ms    QRS Duration 90 ms    Q-T Interval 400 ms    QTC Calculation(Bezet) 476 ms    P Axis 46 degrees    R Axis -12 degrees    T Axis 76 degrees    Diagnosis Line Normal sinus rhythm  Possible Left atrial enlargement  Left ventricular hypertrophy  Nonspecific T wave abnormality  Prolonged QT    Confirmed by Alex Victoria ([id number]) on 3/19/2018 2:17:06 PM
Ventricular Rate 85 BPM    Atrial Rate 85 BPM    P-R Interval 142 ms    QRS Duration 90 ms    Q-T Interval 400 ms    QTC Calculation(Bezet) 476 ms    P Axis 46 degrees    R Axis -12 degrees    T Axis 76 degrees    Diagnosis Line Normal sinus rhythm  Possible Left atrial enlargement  Left ventricular hypertrophy  Nonspecific T wave abnormality  Prolonged QT    Confirmed by Alex Victoria ([id number]) on 3/19/2018 2:17:06 PM

## 2018-03-21 NOTE — PROGRESS NOTE BEHAVIORAL HEALTH - PROBLEM SELECTOR PLAN 1
Problem: Schizoaffective disorder, bipolar type.   -Restarted Klonopin 0.5 mg po qhs + 0.5 q12hrs PRN  -Continue Prolixin 5 mg qAM and 15 mg qHS  -Continue Lithium 450 mg qAM and 600 mg qHS. Li level was slightly low at 0.51  -Continue Trazodone 100 mg  -Continue Propranolol 10 mg BID for akathisia.

## 2018-03-21 NOTE — PROGRESS NOTE BEHAVIORAL HEALTH - SUMMARY
xsxx  52 year old Bangladeshi speaking male returns to [facility] for the third time ; this time because of  "high anxiety"  noted to be pacing and anxious about Klonopin was stopped as outpatient;  continued to take Prolixn for paranoid psychotic sxs and Lithium for mood. .  ;3/20: less pacing; mood less anxious; compliant with low dose Klonpin confirmed via i-stop; also learned that patient was compliant with po Prolixin.  Possibiity of akathisia engterntained as increasing agitation and anxiety when Klonopin stopped as outpatient.  Patient typlically stays in room most of time and is guarded and somewhat evasive.  3/21: still c/o akathisia however requesting klonopin to be dosed in hs due to increased seddation in the daytime

## 2018-03-22 PROCEDURE — 99233 SBSQ HOSP IP/OBS HIGH 50: CPT

## 2018-03-22 RX ORDER — CLONAZEPAM 1 MG
0.5 TABLET ORAL
Qty: 0 | Refills: 0 | Status: DISCONTINUED | OUTPATIENT
Start: 2018-03-22 | End: 2018-03-23

## 2018-03-22 RX ORDER — LITHIUM CARBONATE 300 MG/1
600 TABLET, EXTENDED RELEASE ORAL
Qty: 0 | Refills: 0 | Status: DISCONTINUED | OUTPATIENT
Start: 2018-03-22 | End: 2018-03-29

## 2018-03-22 RX ORDER — MIRTAZAPINE 45 MG/1
7.5 TABLET, ORALLY DISINTEGRATING ORAL AT BEDTIME
Qty: 0 | Refills: 0 | Status: DISCONTINUED | OUTPATIENT
Start: 2018-03-22 | End: 2018-03-23

## 2018-03-22 RX ADMIN — FLUPHENAZINE HYDROCHLORIDE 5 MILLIGRAM(S): 1 TABLET, FILM COATED ORAL at 09:56

## 2018-03-22 RX ADMIN — LITHIUM CARBONATE 600 MILLIGRAM(S): 300 TABLET, EXTENDED RELEASE ORAL at 21:23

## 2018-03-22 RX ADMIN — Medication 0.5 MILLIGRAM(S): at 21:22

## 2018-03-22 RX ADMIN — Medication 10 MILLIGRAM(S): at 09:56

## 2018-03-22 RX ADMIN — FLUPHENAZINE HYDROCHLORIDE 15 MILLIGRAM(S): 1 TABLET, FILM COATED ORAL at 21:22

## 2018-03-22 RX ADMIN — Medication 10 MILLIGRAM(S): at 21:22

## 2018-03-22 RX ADMIN — MIRTAZAPINE 7.5 MILLIGRAM(S): 45 TABLET, ORALLY DISINTEGRATING ORAL at 21:23

## 2018-03-22 RX ADMIN — LITHIUM CARBONATE 450 MILLIGRAM(S): 300 TABLET, EXTENDED RELEASE ORAL at 09:56

## 2018-03-22 RX ADMIN — AMLODIPINE BESYLATE 5 MILLIGRAM(S): 2.5 TABLET ORAL at 09:56

## 2018-03-22 NOTE — PROGRESS NOTE BEHAVIORAL HEALTH - SUMMARY
53 yo Lao male with PPH of schizoaffective disorder, bipolar type, and multiple recent admissions at [facility] for paranoia, now presents with increased anxiety and agitation triggered by recent discontinuation of Klonopin after prior discharge on 3/8/18. He still complains of insomnia and remains anxious, so Remeron will be started for sleep. He continues to warrant inpatient hospitalization for medication management, symptom stabilization, and safety.     ;3/20: less pacing; mood less anxious; compliant with low dose Klonpin confirmed via i-stop; also learned that patient was compliant with po Prolixin.  Possibiity of akathisia engterntained as increasing agitation and anxiety when Klonopin stopped as outpatient.  Patient typlically stays in room most of time and is guarded and somewhat evasive.  3/21: still c/o akathisia however requesting klonopin to be dosed in hs due to increased seddation in the daytime

## 2018-03-22 NOTE — PROGRESS NOTE BEHAVIORAL HEALTH - PROBLEM SELECTOR PLAN 1
-Started Remeron 7.5 mg qHS  -Continue Klonopin 0.5 mg qHS  -Continue Prolixin 5 mg qAM and 15 mg qHS  -Continue Lithium 450 mg qAM and 600 mg qHS.  -Continue Trazodone 100 mg  -Continue Propranolol 10 mg BID for akathisia.

## 2018-03-22 NOTE — PROGRESS NOTE BEHAVIORAL HEALTH - NSBHFUPINTERVALHXFT_PSY_A_CORE
[place]  ID [id number]    Case was discussed with treatment team and chart was reviewed.  Patient was compliant with all meds. As per nursing report, he has been visible on the unit, pleasant, and animated. He was initially refusing the  phone line, but accepted it during the individual interview. Patient describes his mood as "afraid" because he has been unable to sleep well for 2 nights and wants a sleeping aid. Denies any changes in appetite. Denies any manic/hypomanic symptoms, paranoid thoughts/delusions, perceptual disturbances, SI/HI.

## 2018-03-22 NOTE — PROGRESS NOTE BEHAVIORAL HEALTH - CASE SUMMARY
52 year old Romanian speaking male returns to [facility] for the third time ; this time because of  "high anxiety"  noted to be pacing and anxious about Klonopin was stopped as outpatient;  continued to take Prolixin for paranoid psychotic sxs and Lithium for mood. ;.  ;;3/22: seen with Romanian speaking staff (Patient refused to use  phone); has trouble sleeping; pacing more during the day;  Remeron being added to help sleep in place of Trazodone;  Lithium raised to 600mg bid in view of low or sub-therapeutic level: 0.51;  and Klonopin raised to 0.5mg at 13h and 21h for akasthisia and anxiety.

## 2018-03-22 NOTE — PROGRESS NOTE BEHAVIORAL HEALTH - NSBHCHARTREVIEWVS_PSY_A_CORE FT
Vital Signs Last 24 Hrs  T(C): 36.8 (21 Mar 2018 17:29), Max: 36.8 (21 Mar 2018 17:29)  T(F): 98.3 (21 Mar 2018 17:29), Max: 98.3 (21 Mar 2018 17:29)  HR: 66 (21 Mar 2018 17:29) (66 - 69)  BP: 130/96 (21 Mar 2018 17:29) (130/96 - 143/89)  BP(mean): --  RR: 18 (21 Mar 2018 17:29) (18 - 20)  SpO2: -- Vital Signs Last 24 Hrs  T(C): 36.9 (22 Mar 2018 10:00), Max: 36.9 (22 Mar 2018 10:00)  T(F): 98.5 (22 Mar 2018 10:00), Max: 98.5 (22 Mar 2018 10:00)  HR: 92 (22 Mar 2018 10:00) (66 - 92)  BP: 148/96 (22 Mar 2018 10:00) (130/96 - 148/96)  BP(mean): --  RR: 20 (22 Mar 2018 10:00) (18 - 20)  SpO2: --

## 2018-03-23 LAB
ALBUMIN SERPL ELPH-MCNC: 3.8 G/DL — SIGNIFICANT CHANGE UP (ref 3.3–5)
ALP SERPL-CCNC: 69 U/L — SIGNIFICANT CHANGE UP (ref 40–120)
ALT FLD-CCNC: 17 U/L — SIGNIFICANT CHANGE UP (ref 10–45)
ANION GAP SERPL CALC-SCNC: 13 MMOL/L — SIGNIFICANT CHANGE UP (ref 5–17)
AST SERPL-CCNC: 13 U/L — SIGNIFICANT CHANGE UP (ref 10–40)
BILIRUB SERPL-MCNC: 0.4 MG/DL — SIGNIFICANT CHANGE UP (ref 0.2–1.2)
BUN SERPL-MCNC: 14 MG/DL — SIGNIFICANT CHANGE UP (ref 7–23)
CALCIUM SERPL-MCNC: 9.9 MG/DL — SIGNIFICANT CHANGE UP (ref 8.4–10.5)
CHLORIDE SERPL-SCNC: 100 MMOL/L — SIGNIFICANT CHANGE UP (ref 96–108)
CO2 SERPL-SCNC: 25 MMOL/L — SIGNIFICANT CHANGE UP (ref 22–31)
CREAT SERPL-MCNC: 0.71 MG/DL — SIGNIFICANT CHANGE UP (ref 0.5–1.3)
GLUCOSE SERPL-MCNC: 142 MG/DL — HIGH (ref 70–99)
POTASSIUM SERPL-MCNC: 4.1 MMOL/L — SIGNIFICANT CHANGE UP (ref 3.5–5.3)
POTASSIUM SERPL-SCNC: 4.1 MMOL/L — SIGNIFICANT CHANGE UP (ref 3.5–5.3)
PROT SERPL-MCNC: 6.9 G/DL — SIGNIFICANT CHANGE UP (ref 6–8.3)
SODIUM SERPL-SCNC: 138 MMOL/L — SIGNIFICANT CHANGE UP (ref 135–145)
T4 AB SER-ACNC: 8.36 UG/DL — SIGNIFICANT CHANGE UP (ref 3.17–11.72)
TSH SERPL-MCNC: 0.84 UIU/ML — SIGNIFICANT CHANGE UP (ref 0.35–4.94)

## 2018-03-23 PROCEDURE — 99222 1ST HOSP IP/OBS MODERATE 55: CPT

## 2018-03-23 PROCEDURE — 99233 SBSQ HOSP IP/OBS HIGH 50: CPT

## 2018-03-23 PROCEDURE — 93010 ELECTROCARDIOGRAM REPORT: CPT

## 2018-03-23 RX ORDER — CLONAZEPAM 1 MG
1 TABLET ORAL AT BEDTIME
Qty: 0 | Refills: 0 | Status: DISCONTINUED | OUTPATIENT
Start: 2018-03-23 | End: 2018-03-27

## 2018-03-23 RX ORDER — TRAZODONE HCL 50 MG
50 TABLET ORAL AT BEDTIME
Qty: 0 | Refills: 0 | Status: DISCONTINUED | OUTPATIENT
Start: 2018-03-23 | End: 2018-03-29

## 2018-03-23 RX ORDER — CLONAZEPAM 1 MG
0.5 TABLET ORAL ONCE
Qty: 0 | Refills: 0 | Status: DISCONTINUED | OUTPATIENT
Start: 2018-03-23 | End: 2018-03-23

## 2018-03-23 RX ORDER — ACETAMINOPHEN 500 MG
650 TABLET ORAL EVERY 6 HOURS
Qty: 0 | Refills: 0 | Status: DISCONTINUED | OUTPATIENT
Start: 2018-03-23 | End: 2018-03-29

## 2018-03-23 RX ORDER — AMLODIPINE BESYLATE 2.5 MG/1
10 TABLET ORAL DAILY
Qty: 0 | Refills: 0 | Status: DISCONTINUED | OUTPATIENT
Start: 2018-03-23 | End: 2018-03-29

## 2018-03-23 RX ADMIN — LITHIUM CARBONATE 600 MILLIGRAM(S): 300 TABLET, EXTENDED RELEASE ORAL at 21:43

## 2018-03-23 RX ADMIN — Medication 50 MILLIGRAM(S): at 21:43

## 2018-03-23 RX ADMIN — FLUPHENAZINE HYDROCHLORIDE 15 MILLIGRAM(S): 1 TABLET, FILM COATED ORAL at 21:43

## 2018-03-23 RX ADMIN — Medication 0.5 MILLIGRAM(S): at 11:44

## 2018-03-23 RX ADMIN — AMLODIPINE BESYLATE 5 MILLIGRAM(S): 2.5 TABLET ORAL at 08:00

## 2018-03-23 RX ADMIN — FLUPHENAZINE HYDROCHLORIDE 5 MILLIGRAM(S): 1 TABLET, FILM COATED ORAL at 10:11

## 2018-03-23 RX ADMIN — Medication 10 MILLIGRAM(S): at 08:00

## 2018-03-23 RX ADMIN — Medication 1 MILLIGRAM(S): at 21:43

## 2018-03-23 RX ADMIN — Medication 650 MILLIGRAM(S): at 17:53

## 2018-03-23 RX ADMIN — Medication 10 MILLIGRAM(S): at 21:43

## 2018-03-23 RX ADMIN — LITHIUM CARBONATE 600 MILLIGRAM(S): 300 TABLET, EXTENDED RELEASE ORAL at 10:11

## 2018-03-23 RX ADMIN — Medication 650 MILLIGRAM(S): at 17:51

## 2018-03-23 RX ADMIN — Medication 0.5 MILLIGRAM(S): at 12:57

## 2018-03-23 NOTE — PROGRESS NOTE BEHAVIORAL HEALTH - NSBHFUPINTERVALHXFT_PSY_A_CORE
[place]  ID [id number]    Case was discussed with treatment team and chart was reviewed.  Patient was compliant with all meds. As per nursing report, he is still pacing around the unit, but it has lessened. He reportedly was tearful last night about his relationship problems with his wife and girlfriend. Patient reports anxious mood and continues to have poor sleep. His appetite is okay. Denies any manic/hypomanic symptoms, paranoid thoughts/delusions, perceptual disturbances, SI/HI.    Medical problems: Patient's blood pressure has been elevated at 173/120, HR 80 and on repeat 172/117, HR 93. On ROS, patient denies headache, changes in vision, chest pain, SOB, or abdominal pain. EKG was done which showed no changes compared to admission EKG, based on medicine attending's preliminary read. Medicine was consulted and recommended increasing Norvasc to 10 mg if BP was persistently elevated and patient is at rest. Patient reported that he felt better after being given Klonopin 1 mg PRN.

## 2018-03-23 NOTE — PROGRESS NOTE BEHAVIORAL HEALTH - PROBLEM SELECTOR PLAN 1
-Continue Remeron 7.5 mg qHS  -Increased Klonopin to 1 mg qHS  -Continue Prolixin 5 mg qAM and 15 mg qHS  -Continue Lithium 450 mg qAM and 600 mg qHS.  -Continue Trazodone 100 mg  -Continue Propranolol 10 mg BID for akathisia.

## 2018-03-23 NOTE — PROGRESS NOTE BEHAVIORAL HEALTH - NSBHCHARTREVIEWVS_PSY_A_CORE FT
Vital Signs Last 24 Hrs  T(C): 37 (22 Mar 2018 17:00), Max: 37 (22 Mar 2018 17:00)  T(F): 98.6 (22 Mar 2018 17:00), Max: 98.6 (22 Mar 2018 17:00)  HR: 71 (22 Mar 2018 17:00) (71 - 92)  BP: 151/96 (22 Mar 2018 17:00) (148/96 - 151/96)  BP(mean): --  RR: 17 (22 Mar 2018 17:00) (17 - 20)  SpO2: -- Vital Signs Last 24 Hrs  T(C): 36.7 (23 Mar 2018 17:00), Max: 37.1 (23 Mar 2018 14:30)  T(F): 98 (23 Mar 2018 17:00), Max: 98.7 (23 Mar 2018 14:30)  HR: 87 (23 Mar 2018 17:00) (83 - 87)  BP: 142/90 (23 Mar 2018 17:00) (142/90 - 147/93)  BP(mean): --  RR: 16 (23 Mar 2018 17:00) (14 - 16)  SpO2: 97% (23 Mar 2018 14:30) (97% - 97%)

## 2018-03-23 NOTE — PROGRESS NOTE BEHAVIORAL HEALTH - SUMMARY
51 yo Canadian male with PPH of schizoaffective disorder, bipolar type, and multiple recent admissions at [facility] for paranoia, now presents with increased anxiety and agitation triggered by recent discontinuation of Klonopin after prior discharge on 3/8/18. He is still anxious and reports poor sleep, so changed Klonopin dosing to 1 mg qHS. He continues to warrant inpatient hospitalization for medication management, symptom stabilization, and safety.     ;3/20: less pacing; mood less anxious; compliant with low dose Klonpin confirmed via i-stop; also learned that patient was compliant with po Prolixin.  Possibiity of akathisia engterntained as increasing agitation and anxiety when Klonopin stopped as outpatient.  Patient typlically stays in room most of time and is guarded and somewhat evasive.  3/21: still c/o akathisia however requesting klonopin to be dosed in hs due to increased seddation in the daytime

## 2018-03-23 NOTE — PROGRESS NOTE BEHAVIORAL HEALTH - CASE SUMMARY
52 year old Kosovan speaking male returns to [facility] for the third time ; this time because of  "high anxiety"  noted to be pacing and anxious about Klonopin was stopped as outpatient;  continued to take Prolixin for paranoid psychotic sxs and Lithium for mood. ;.  ;;3/23  interviewed with [employer] in [place] # [id number];  patient slept poorly ; restless ; bp very elevated ICU Vital Signs Last 24 Hrs  T(C): 36.7 (23 Mar 2018 17:00), Max: 37.1 (23 Mar 2018 10:20) T(F): 98 (23 Mar 2018 17:00), Max: 98.7 (23 Mar 2018 10:20) HR: 90 (23 Mar 2018 19:00) (74 - 90 )  BP: 144/95 (23 Mar 2018 19:00) (142/90 - 173/120)  medicine consulted; Klonopin given mid-day with good result; Norvasc incrased;  patient later in the day less anxious ; better mood; ; returning to Trazodone from Remeron (may have had paradoxical effect) and continue with increased nighttime Klonopin

## 2018-03-24 RX ORDER — BENZTROPINE MESYLATE 1 MG
1 TABLET ORAL EVERY 6 HOURS
Qty: 0 | Refills: 0 | Status: DISCONTINUED | OUTPATIENT
Start: 2018-03-24 | End: 2018-03-29

## 2018-03-24 RX ORDER — BENZTROPINE MESYLATE 1 MG
1 TABLET ORAL
Qty: 0 | Refills: 0 | Status: DISCONTINUED | OUTPATIENT
Start: 2018-03-24 | End: 2018-03-29

## 2018-03-24 RX ADMIN — FLUPHENAZINE HYDROCHLORIDE 15 MILLIGRAM(S): 1 TABLET, FILM COATED ORAL at 21:19

## 2018-03-24 RX ADMIN — FLUPHENAZINE HYDROCHLORIDE 5 MILLIGRAM(S): 1 TABLET, FILM COATED ORAL at 10:07

## 2018-03-24 RX ADMIN — Medication 1 MILLIGRAM(S): at 21:19

## 2018-03-24 RX ADMIN — LITHIUM CARBONATE 600 MILLIGRAM(S): 300 TABLET, EXTENDED RELEASE ORAL at 10:07

## 2018-03-24 RX ADMIN — Medication 10 MILLIGRAM(S): at 10:07

## 2018-03-24 RX ADMIN — Medication 1 MILLIGRAM(S): at 21:20

## 2018-03-24 RX ADMIN — AMLODIPINE BESYLATE 10 MILLIGRAM(S): 2.5 TABLET ORAL at 10:07

## 2018-03-24 RX ADMIN — Medication 1 MILLIGRAM(S): at 19:03

## 2018-03-24 RX ADMIN — Medication 50 MILLIGRAM(S): at 21:20

## 2018-03-24 RX ADMIN — Medication 10 MILLIGRAM(S): at 21:20

## 2018-03-24 RX ADMIN — LITHIUM CARBONATE 600 MILLIGRAM(S): 300 TABLET, EXTENDED RELEASE ORAL at 21:19

## 2018-03-24 RX ADMIN — Medication 1 MILLIGRAM(S): at 12:00

## 2018-03-25 RX ADMIN — Medication 1 MILLIGRAM(S): at 21:32

## 2018-03-25 RX ADMIN — AMLODIPINE BESYLATE 10 MILLIGRAM(S): 2.5 TABLET ORAL at 09:46

## 2018-03-25 RX ADMIN — LITHIUM CARBONATE 600 MILLIGRAM(S): 300 TABLET, EXTENDED RELEASE ORAL at 09:46

## 2018-03-25 RX ADMIN — Medication 1 MILLIGRAM(S): at 09:46

## 2018-03-25 RX ADMIN — FLUPHENAZINE HYDROCHLORIDE 15 MILLIGRAM(S): 1 TABLET, FILM COATED ORAL at 21:32

## 2018-03-25 RX ADMIN — FLUPHENAZINE HYDROCHLORIDE 5 MILLIGRAM(S): 1 TABLET, FILM COATED ORAL at 09:46

## 2018-03-25 RX ADMIN — Medication 10 MILLIGRAM(S): at 09:46

## 2018-03-25 RX ADMIN — LITHIUM CARBONATE 600 MILLIGRAM(S): 300 TABLET, EXTENDED RELEASE ORAL at 21:32

## 2018-03-25 RX ADMIN — Medication 10 MILLIGRAM(S): at 21:32

## 2018-03-26 PROCEDURE — 99233 SBSQ HOSP IP/OBS HIGH 50: CPT

## 2018-03-26 RX ADMIN — Medication 1 MILLIGRAM(S): at 10:33

## 2018-03-26 RX ADMIN — FLUPHENAZINE HYDROCHLORIDE 15 MILLIGRAM(S): 1 TABLET, FILM COATED ORAL at 21:24

## 2018-03-26 RX ADMIN — LITHIUM CARBONATE 600 MILLIGRAM(S): 300 TABLET, EXTENDED RELEASE ORAL at 21:24

## 2018-03-26 RX ADMIN — Medication 10 MILLIGRAM(S): at 10:33

## 2018-03-26 RX ADMIN — AMLODIPINE BESYLATE 10 MILLIGRAM(S): 2.5 TABLET ORAL at 10:33

## 2018-03-26 RX ADMIN — FLUPHENAZINE HYDROCHLORIDE 5 MILLIGRAM(S): 1 TABLET, FILM COATED ORAL at 10:32

## 2018-03-26 RX ADMIN — Medication 1 MILLIGRAM(S): at 21:24

## 2018-03-26 RX ADMIN — Medication 10 MILLIGRAM(S): at 21:24

## 2018-03-26 RX ADMIN — LITHIUM CARBONATE 600 MILLIGRAM(S): 300 TABLET, EXTENDED RELEASE ORAL at 10:33

## 2018-03-26 NOTE — PROGRESS NOTE BEHAVIORAL HEALTH - PROBLEM SELECTOR PLAN 1
-Continue Remeron 7.5 mg qHS  -Increased Klonopin to 1 mg qHS  -Continue Prolixin 5 mg qAM and 15 mg qHS  -Continue Lithium 450 mg qAM and 600 mg qHS.  -Continue Trazodone 100 mg  -Continue Propranolol 10 mg BID for akathisia. -Continue Remeron 7.5 mg qHS  -Continue Klonopin to 1 mg qHS  -Continue Prolixin 5 mg qAM and 15 mg qHS  -Continue Lithium 450 mg qAM and 600 mg qHS.  -Continue Trazodone 100 mg  -Continue Propranolol 10 mg BID for akathisia. -Continue Remeron 7.5 mg qHS  -Continue Klonopin to 1 mg qHS  -Decreased Prolixin to 15 mg qHS  -Continue Lithium 450 mg qAM and 600 mg qHS.  -Continue Trazodone 100 mg  -Continue Propranolol 10 mg BID for akathisia.

## 2018-03-26 NOTE — PROGRESS NOTE BEHAVIORAL HEALTH - NSBHFUPINTERVALHXFT_PSY_A_CORE
[place]  ID [id number]    Case was discussed with treatment team and chart was reviewed.  Patient was compliant with all meds. As per nursing report, he continues to be restless and paces around the unit. Cogentin was started over the weekend, but patient was upset because he wanted the Prolixin dose lowered. He reports side effects of fatigue and weakness. Patient reports "tired" mood and insomnia. His appetite is okay. Denies any manic/hypomanic symptoms, paranoid thoughts/delusions, perceptual disturbances, SI/HI.    Medical problems: Patient's blood pressure has been elevated at 173/120, HR 80 and on repeat 172/117, HR 93. On ROS, patient denies headache, changes in vision, chest pain, SOB, or abdominal pain. EKG was done which showed no changes compared to admission EKG, based on medicine attending's preliminary read. Medicine was consulted and recommended increasing Norvasc to 10 mg if BP was persistently elevated and patient is at rest. Patient reported that he felt better after being given Klonopin 1 mg PRN. [place]  ID [id number]    Case was discussed with treatment team and chart was reviewed.  Patient was compliant with all meds. As per nursing report, he continues to be restless and paces around the unit. He is focused on discharge. Cogentin was started over the weekend, but patient was upset because he wanted the Prolixin dose lowered. He reports side effects of fatigue and weakness. Patient reports "tired" mood and poor sleep. No problems reported with appetite. Denies any manic/hypomanic symptoms, paranoid thoughts/delusions, perceptual disturbances, SI/HI.    Medical problems: BP under better control. Denies headache, changes in vision, chest pain, SOB.

## 2018-03-26 NOTE — PROGRESS NOTE BEHAVIORAL HEALTH - NSBHCHARTREVIEWLAB_PSY_A_CORE FT
03-23    138  |  100  |  14  ----------------------------<  142<H>  4.1   |  25  |  0.71    Ca    9.9      23 Mar 2018 14:19    TPro  6.9  /  Alb  3.8  /  TBili  0.4  /  DBili  x   /  AST  13  /  ALT  17  /  AlkPhos  69  03-23    TSH 0.844, serum T4 0.844
13.4   10.8  )-----------( 299      ( 18 Mar 2018 13:42 )             40.2   03-18    140  |  102  |  12  ----------------------------<  122<H>  3.8   |  26  |  0.71    Ca    9.6      18 Mar 2018 13:42
13.4   10.8  )-----------( 299      ( 18 Mar 2018 13:42 )             40.2   03-18    140  |  102  |  12  ----------------------------<  122<H>  3.8   |  26  |  0.71    Ca    9.6      18 Mar 2018 13:42
03-23    138  |  100  |  14  ----------------------------<  142<H>  4.1   |  25  |  0.71    Ca    9.9      23 Mar 2018 14:19    TPro  6.9  /  Alb  3.8  /  TBili  0.4  /  DBili  x   /  AST  13  /  ALT  17  /  AlkPhos  69  03-23    TSH 0.844, serum T4 0.844

## 2018-03-26 NOTE — PROGRESS NOTE BEHAVIORAL HEALTH - CASE SUMMARY
52 year old Tongan speaking male returns to [facility] for the third time ; this time because of  "high anxiety"  noted to be pacing and anxious about Klonopin was stopped as outpatient;  continued to take Prolixin for paranoid psychotic sxs and Lithium for mood. ;.  ;;3/26: [employer]: [place] (Oksana) #[id number])'  patient feels tired and fatigued; wants to lower Prolixin (now at 20mg a day total);  appetite good (eating lunch); sleep poor (not corroborated by staff) ; no pain no s/h i/i/p or avh;  bp remains elevated by recent increase in Norvasc to 10mg ; will follow.

## 2018-03-26 NOTE — PROGRESS NOTE BEHAVIORAL HEALTH - NSBHCHARTREVIEWVS_PSY_A_CORE FT
Vital Signs Last 24 Hrs  T(C): 36.6 (26 Mar 2018 09:08), Max: 36.9 (25 Mar 2018 17:00)  T(F): 97.9 (26 Mar 2018 09:08), Max: 98.5 (25 Mar 2018 17:00)  HR: 75 (26 Mar 2018 09:08) (70 - 75)  BP: 153/97 (26 Mar 2018 09:08) (138/88 - 153/97)  BP(mean): --  RR: 20 (26 Mar 2018 09:08) (17 - 20)  SpO2: --

## 2018-03-26 NOTE — PROGRESS NOTE BEHAVIORAL HEALTH - SUMMARY
53 yo [place] male with PPH of schizoaffective disorder, bipolar type, and multiple recent admissions at [facility] for paranoia, now presents with increased anxiety and agitation triggered by recent discontinuation of Klonopin after prior discharge on 3/8/18. He is still anxious and reports poor sleep, so changed Klonopin dosing to 1 mg qHS. He continues to warrant inpatient hospitalization for medication management, symptom stabilization, and safety.     ;3/20: less pacing; mood less anxious; compliant with low dose Klonpin confirmed via i-stop; also learned that patient was compliant with po Prolixin.  Possibiity of akathisia engterntained as increasing agitation and anxiety when Klonopin stopped as outpatient.  Patient typlically stays in room most of time and is guarded and somewhat evasive.  3/21: still c/o akathisia however requesting klonopin to be dosed in hs due to increased seddation in the daytime 53 yo Chilean male with PPH of schizoaffective disorder, bipolar type, and multiple recent admissions at [facility] for paranoia, now presents with increased anxiety and agitation triggered by recent discontinuation of Klonopin after prior discharge on 3/8/18. He remains anxious, but is more organized and goal-directed. He continues to warrant inpatient hospitalization for medication management, symptom stabilization, and safety.     ;3/20: less pacing; mood less anxious; compliant with low dose Klonpin confirmed via i-stop; also learned that patient was compliant with po Prolixin.  Possibiity of akathisia engterntained as increasing agitation and anxiety when Klonopin stopped as outpatient.  Patient typlically stays in room most of time and is guarded and somewhat evasive.  3/21: still c/o akathisia however requesting klonopin to be dosed in hs due to increased seddation in the daytime

## 2018-03-27 PROCEDURE — 99233 SBSQ HOSP IP/OBS HIGH 50: CPT

## 2018-03-27 RX ORDER — CLONAZEPAM 1 MG
0.5 TABLET ORAL EVERY 12 HOURS
Qty: 0 | Refills: 0 | Status: DISCONTINUED | OUTPATIENT
Start: 2018-03-27 | End: 2018-03-29

## 2018-03-27 RX ORDER — CLONAZEPAM 1 MG
1.5 TABLET ORAL AT BEDTIME
Qty: 0 | Refills: 0 | Status: DISCONTINUED | OUTPATIENT
Start: 2018-03-27 | End: 2018-03-29

## 2018-03-27 RX ADMIN — FLUPHENAZINE HYDROCHLORIDE 15 MILLIGRAM(S): 1 TABLET, FILM COATED ORAL at 22:14

## 2018-03-27 RX ADMIN — Medication 10 MILLIGRAM(S): at 22:14

## 2018-03-27 RX ADMIN — Medication 1 MILLIGRAM(S): at 22:14

## 2018-03-27 RX ADMIN — Medication 1 MILLIGRAM(S): at 10:10

## 2018-03-27 RX ADMIN — Medication 10 MILLIGRAM(S): at 10:10

## 2018-03-27 RX ADMIN — AMLODIPINE BESYLATE 10 MILLIGRAM(S): 2.5 TABLET ORAL at 10:10

## 2018-03-27 RX ADMIN — Medication 1.5 MILLIGRAM(S): at 22:14

## 2018-03-27 RX ADMIN — LITHIUM CARBONATE 600 MILLIGRAM(S): 300 TABLET, EXTENDED RELEASE ORAL at 22:14

## 2018-03-27 RX ADMIN — LITHIUM CARBONATE 600 MILLIGRAM(S): 300 TABLET, EXTENDED RELEASE ORAL at 10:11

## 2018-03-27 NOTE — PROGRESS NOTE BEHAVIORAL HEALTH - NSBHCHARTREVIEWVS_PSY_A_CORE FT
Vital Signs Last 24 Hrs  T(C): 36.4 (26 Mar 2018 17:00), Max: 36.6 (26 Mar 2018 09:08)  T(F): 97.6 (26 Mar 2018 17:00), Max: 97.9 (26 Mar 2018 09:08)  HR: 73 (26 Mar 2018 17:00) (73 - 75)  BP: 120/74 (26 Mar 2018 17:00) (120/74 - 153/97)  BP(mean): --  RR: 17 (26 Mar 2018 17:00) (17 - 20)  SpO2: -- Vital Signs Last 24 Hrs  T(C): 36.4 (26 Mar 2018 17:00), Max: 36.4 (26 Mar 2018 17:00)  T(F): 97.6 (26 Mar 2018 17:00), Max: 97.6 (26 Mar 2018 17:00)  HR: 73 (26 Mar 2018 17:00) (73 - 73)  BP: 120/74 (26 Mar 2018 17:00) (120/74 - 120/74)  BP(mean): --  RR: 17 (26 Mar 2018 17:00) (17 - 17)  SpO2: --

## 2018-03-27 NOTE — PROGRESS NOTE BEHAVIORAL HEALTH - NSBHADMITIPBHPROVFT_PSY_A_CORE
see yesterday's note

## 2018-03-27 NOTE — PROGRESS NOTE BEHAVIORAL HEALTH - NSBHFUPINTERVALHXFT_PSY_A_CORE
in the dayroom sitting alone; fair eye contact.  clean; spends time in the hallway.   cognition is intact.  denies AVH or s/h i/i/p.  speech normal volume, spontaneous, clear.  affect mostly thought congruent; no strangeness of language use.  i&j: fair to poor.  accepts treatment; however not reflective on sxs or situation.   ICM to see patient on 4/2.  Patient has appointment on 3/30  .  Less pacing. No tremor.  Normal gait.  Some affective constriction.  Somewhat anxious but stated he has no more anxiety or fear. in the day room sitting alone; fair eye contact.  clean; spends time in the hallway.   cognition is intact.  denies AVH or s/h i/i/p.  speech normal volume, spontaneous, clear.  affect mostly thought congruent; no strangeness of language use.  i&j: fair to poor.  accepts treatment; however not reflective on sxs or situation.   ICM to see patient on 4/2.  Patient has appointment on 3/30  .  Less pacing. No tremor.  Normal gait.  Some affective constriction.  Somewhat anxious but stated he has no more anxiety or fear.

## 2018-03-27 NOTE — PROGRESS NOTE BEHAVIORAL HEALTH - SUMMARY
Summary (include case differential, formulation and patient response to therapy): 51 yo [place] male with PPH of schizoaffective disorder, bipolar type, and multiple recent admissions at [facility] for paranoia, now presents with increased anxiety and agitation triggered by recent discontinuation of Klonopin after prior discharge on 3/8/18. He remains anxious, but is more organized and goal-directed. He continues to warrant inpatient hospitalization for medication management, symptom stabilization, and safety.     ;3/20: less pacing; mood less anxious; compliant with low dose Klonpin confirmed via i-stop; also learned that patient was compliant with po Prolixin.  Possibiity of akathisia engterntained as increasing agitation and anxiety when Klonopin stopped as outpatient.  Patient typlically stays in room most of time and is guarded and somewhat evasive.  3/21: still c/o akathisia however requesting klonopin to be dosed in hs due to increased seddation in the daytime.       ;;3.27: psychotic sxs decreasing.  mood is more stable. accepts treatment; however not reflective on sxs or situation.   ICM to see patient on 4/2.  Patient has appointment on 3/30  .  Raising pm Klonopin to 1.5mg at night  for anxiety/restlessness.  (same total daily dose as in recent past). Summary (include case differential, formulation and patient response to therapy): 53 yo Citizen of [place] male with PPH of schizoaffective disorder, bipolar type, and multiple recent admissions at [facility] for paranoia, now presents with increased anxiety and agitation triggered by recent discontinuation of Klonopin after prior discharge on 3/8/18. He remains anxious, but is more organized and goal-directed. He continues to warrant inpatient hospitalization for medication management, symptom stabilization, and safety.     ;3/20: less pacing; mood less anxious; compliant with low dose Klonpin confirmed via i-stop; also learned that patient was compliant with po Prolixin.  Possibiity of akathisia engterntained as increasing agitation and anxiety when Klonopin stopped as outpatient.  Patient typlically stays in room most of time and is guarded and somewhat evasive.  bp better on Norvasc 10mg  BP: 120/74 (26 Mar 2018 17:00) (120/74 - 120/74)  3/21: still c/o akathisia however requesting klonopin to be dosed in hs due to increased seddation in the daytime.       ;;3.27: psychotic sxs decreasing.  mood is more stable. accepts treatment; however not reflective on sxs or situation.   ICM to see patient on 4/2.  Patient has appointment on 3/30  .  Raising pm Klonopin to 1.5mg at night  for anxiety/restlessness.  (same total daily dose as in recent past).

## 2018-03-28 LAB
ALBUMIN SERPL ELPH-MCNC: 4.1 G/DL — SIGNIFICANT CHANGE UP (ref 3.3–5)
ALP SERPL-CCNC: 68 U/L — SIGNIFICANT CHANGE UP (ref 40–120)
ALT FLD-CCNC: 12 U/L — SIGNIFICANT CHANGE UP (ref 10–45)
ANION GAP SERPL CALC-SCNC: 10 MMOL/L — SIGNIFICANT CHANGE UP (ref 5–17)
AST SERPL-CCNC: 13 U/L — SIGNIFICANT CHANGE UP (ref 10–40)
BILIRUB SERPL-MCNC: 0.2 MG/DL — SIGNIFICANT CHANGE UP (ref 0.2–1.2)
BUN SERPL-MCNC: 19 MG/DL — SIGNIFICANT CHANGE UP (ref 7–23)
CALCIUM SERPL-MCNC: 10 MG/DL — SIGNIFICANT CHANGE UP (ref 8.4–10.5)
CHLORIDE SERPL-SCNC: 104 MMOL/L — SIGNIFICANT CHANGE UP (ref 96–108)
CO2 SERPL-SCNC: 26 MMOL/L — SIGNIFICANT CHANGE UP (ref 22–31)
CREAT SERPL-MCNC: 0.92 MG/DL — SIGNIFICANT CHANGE UP (ref 0.5–1.3)
GLUCOSE SERPL-MCNC: 101 MG/DL — HIGH (ref 70–99)
LITHIUM SERPL-MCNC: 0.72 MMOL/L — SIGNIFICANT CHANGE UP (ref 0.6–1.2)
POTASSIUM SERPL-MCNC: 4.1 MMOL/L — SIGNIFICANT CHANGE UP (ref 3.5–5.3)
POTASSIUM SERPL-SCNC: 4.1 MMOL/L — SIGNIFICANT CHANGE UP (ref 3.5–5.3)
PROT SERPL-MCNC: 7.2 G/DL — SIGNIFICANT CHANGE UP (ref 6–8.3)
SODIUM SERPL-SCNC: 140 MMOL/L — SIGNIFICANT CHANGE UP (ref 135–145)

## 2018-03-28 PROCEDURE — 99233 SBSQ HOSP IP/OBS HIGH 50: CPT

## 2018-03-28 RX ORDER — CLONAZEPAM 1 MG
3 TABLET ORAL
Qty: 45 | Refills: 0 | OUTPATIENT
Start: 2018-03-28 | End: 2018-04-11

## 2018-03-28 RX ORDER — FLUPHENAZINE HYDROCHLORIDE 1 MG/1
3 TABLET, FILM COATED ORAL
Qty: 45 | Refills: 0 | OUTPATIENT
Start: 2018-03-28 | End: 2018-04-11

## 2018-03-28 RX ORDER — AMLODIPINE BESYLATE 2.5 MG/1
1 TABLET ORAL
Qty: 30 | Refills: 0 | OUTPATIENT
Start: 2018-03-28 | End: 2018-04-26

## 2018-03-28 RX ORDER — NICOTINE POLACRILEX 2 MG
2 GUM BUCCAL
Qty: 180 | Refills: 0 | OUTPATIENT
Start: 2018-03-28 | End: 2018-04-11

## 2018-03-28 RX ORDER — LITHIUM CARBONATE 300 MG/1
1 TABLET, EXTENDED RELEASE ORAL
Qty: 30 | Refills: 0 | OUTPATIENT
Start: 2018-03-28 | End: 2018-04-11

## 2018-03-28 RX ORDER — PROPRANOLOL HCL 160 MG
1 CAPSULE, EXTENDED RELEASE 24HR ORAL
Qty: 30 | Refills: 0 | OUTPATIENT
Start: 2018-03-28 | End: 2018-04-11

## 2018-03-28 RX ORDER — BENZTROPINE MESYLATE 1 MG
1 TABLET ORAL
Qty: 30 | Refills: 0 | OUTPATIENT
Start: 2018-03-28 | End: 2018-04-11

## 2018-03-28 RX ADMIN — Medication 10 MILLIGRAM(S): at 10:09

## 2018-03-28 RX ADMIN — Medication 1 MILLIGRAM(S): at 10:08

## 2018-03-28 RX ADMIN — AMLODIPINE BESYLATE 10 MILLIGRAM(S): 2.5 TABLET ORAL at 10:08

## 2018-03-28 RX ADMIN — Medication 10 MILLIGRAM(S): at 21:26

## 2018-03-28 RX ADMIN — FLUPHENAZINE HYDROCHLORIDE 15 MILLIGRAM(S): 1 TABLET, FILM COATED ORAL at 21:26

## 2018-03-28 RX ADMIN — LITHIUM CARBONATE 600 MILLIGRAM(S): 300 TABLET, EXTENDED RELEASE ORAL at 10:09

## 2018-03-28 RX ADMIN — Medication 1 MILLIGRAM(S): at 21:26

## 2018-03-28 RX ADMIN — LITHIUM CARBONATE 600 MILLIGRAM(S): 300 TABLET, EXTENDED RELEASE ORAL at 21:26

## 2018-03-28 RX ADMIN — Medication 1.5 MILLIGRAM(S): at 21:26

## 2018-03-28 NOTE — PROGRESS NOTE BEHAVIORAL HEALTH - NSBHATTESTSEENBY_PSY_A_CORE
Attending Psychiatrist supervising NP/Trainee, meeting pt...
Attending Psychiatrist supervising NP/Trainee, meeting pt...
attending Psychiatrist without NP/Trainee
attending Psychiatrist without NP/Trainee
Attending Psychiatrist supervising NP/Trainee, meeting pt...

## 2018-03-28 NOTE — PROGRESS NOTE BEHAVIORAL HEALTH - NSBHCHARTREVIEWVS_PSY_A_CORE FT
Vital Signs Last 24 Hrs  T(C): 36.8 (27 Mar 2018 16:35), Max: 36.8 (27 Mar 2018 16:35)  T(F): 98.2 (27 Mar 2018 16:35), Max: 98.2 (27 Mar 2018 16:35)  HR: 71 (27 Mar 2018 16:35) (71 - 76)  BP: 130/83 (27 Mar 2018 16:35) (130/83 - 155/89)  BP(mean): --  RR: 18 (27 Mar 2018 16:35) (18 - 20)  SpO2: -- Vital Signs Last 24 Hrs  T(C): 37 (28 Mar 2018 10:00), Max: 37 (28 Mar 2018 10:00)  T(F): 98.6 (28 Mar 2018 10:00), Max: 98.6 (28 Mar 2018 10:00)  HR: 80 (28 Mar 2018 10:00) (71 - 80)  BP: 123/85 (28 Mar 2018 10:00) (123/85 - 130/83)  BP(mean): --  RR: 16 (28 Mar 2018 10:00) (16 - 18)  SpO2: --

## 2018-03-28 NOTE — PROGRESS NOTE BEHAVIORAL HEALTH - NSBHFUPINTERVALCCFT_PSY_A_CORE
"I feel bad"
Intervewed with translation from [employer] in Vietnamese:  # [id number]  "I don't want to be too sleepy"  Informed that Klonopin is potentially sedating; however accepted pm Klonopin and Klonopin during day.   reports good sleep and appetite; appetite is good; no complaints of pain; staff notes patient is in behavioral control.
Interviewed using [employer] in [place] # [id number];  had trouble staying asleep; once awake can't fall asleep.  appetite is good.  focused on leaving; no complaints of pain; staff notes patient is in behavioral control.   Wants to leave tomorrow but will accept  Thurs am  3/29.     Patient slept 5 hours.
Pt seen this am (writer is a native speaker). "I am very restless"
"I slept well"
"Not so good"
"Not well"

## 2018-03-28 NOTE — PROGRESS NOTE BEHAVIORAL HEALTH - NS ED BHA MED ROS ENDOCRINE
No complaints
Name band;

## 2018-03-28 NOTE — PROGRESS NOTE BEHAVIORAL HEALTH - NSBHADMITIPOBSFT_PSY_A_CORE
as per unit
makes needs known; no SI
as per unit
makes needs known; no SI

## 2018-03-28 NOTE — PROGRESS NOTE BEHAVIORAL HEALTH - CASE SUMMARY
52 year old Maltese speaking male returns to [facility] for the third time ; this time because of  "high anxiety"  noted to be pacing and anxious about Klonopin was stopped as outpatient;  continued to take Prolixin for paranoid psychotic sxs and Lithium for mood. ;.  ;;3/28: [employer]: Maltese ([place]) #[id number]'   mood very good ; states he no longer feels anxious and that troubling thoughts are not bothering him anymore ; looks forward to leaving tomorrow.  sleep appetite good; no pain issues.

## 2018-03-28 NOTE — PROGRESS NOTE BEHAVIORAL HEALTH - NSBHPTASSESSDT_PSY_A_CORE
21-Mar-2018 08:39
22-Mar-2018 08:46
23-Mar-2018 08:51
27-Mar-2018 08:40
20-Mar-2018 08:55
28-Mar-2018 08:27
26-Mar-2018 08:47

## 2018-03-28 NOTE — PROGRESS NOTE BEHAVIORAL HEALTH - PRIMARY DX
Schizoaffective disorder, bipolar type

## 2018-03-28 NOTE — PROGRESS NOTE BEHAVIORAL HEALTH - PROBLEM SELECTOR PROBLEM 1
Schizoaffective disorder, bipolar type

## 2018-03-28 NOTE — PROGRESS NOTE BEHAVIORAL HEALTH - NSBHADMITCOUNSEL_PSY_A_CORE
prognosis/diagnostic results/impressions and/or recommended studies/importance of adherence to chosen treatment/risks and benefits of treatment options/client/family/caregiver education/instructions for management, treatment and follow up/risk factor reduction
instructions for management, treatment and follow up/risk factor reduction/client/family/caregiver education/diagnostic results/impressions and/or recommended studies/risks and benefits of treatment options/importance of adherence to chosen treatment/prognosis
client/family/caregiver education/prognosis/diagnostic results/impressions and/or recommended studies/importance of adherence to chosen treatment/instructions for management, treatment and follow up/risk factor reduction/risks and benefits of treatment options
prognosis/client/family/caregiver education/risks and benefits of treatment options/instructions for management, treatment and follow up/risk factor reduction/diagnostic results/impressions and/or recommended studies/importance of adherence to chosen treatment
prognosis/instructions for management, treatment and follow up/risk factor reduction/client/family/caregiver education/risks and benefits of treatment options/diagnostic results/impressions and/or recommended studies/importance of adherence to chosen treatment
instructions for management, treatment and follow up/risk factor reduction/diagnostic results/impressions and/or recommended studies/importance of adherence to chosen treatment/prognosis/risks and benefits of treatment options/client/family/caregiver education

## 2018-03-28 NOTE — PROGRESS NOTE BEHAVIORAL HEALTH - MODIFICATIONS
agree with plan to add Cogentin for EPS; lowered Prolixin to 15mg at night and stopped daytime dose for psychosis;  can transition to aftercare on current regime.
continue use of current regime; modify Klonopin regime as suggested above to avoid am drowsiness as requested by patient.
agree with plan to add Cogentin for EPS; can lower Prolixin to 15mg at night and stop daytime dose for psychosis; despite complaints should begin transition to aftercare.
continue use of current regime; modify Klonopin regime as suggested above to avoid am drowsiness as requested by patient.
continue use of current regime; modify Klonopin regime as suggested above to avoid am drowsiness as requested by patient.

## 2018-03-28 NOTE — PROGRESS NOTE BEHAVIORAL HEALTH - THOUGHT CONTENT
Preoccupations

## 2018-03-28 NOTE — PROGRESS NOTE BEHAVIORAL HEALTH - GAIT / STATION
Normal gait / station

## 2018-03-28 NOTE — PROGRESS NOTE BEHAVIORAL HEALTH - PROBLEM SELECTOR PLAN 1
-Continue Klonopin 1.5 mg qHS  -Continue Prolixin 15 mg qHS  -Continue Lithium 600 mg BID  -Continue Cogentin 1 mg BID  -Continue Propranolol 10 mg BID for akathisia

## 2018-03-28 NOTE — PROGRESS NOTE BEHAVIORAL HEALTH - NSBHFUPINTERVALHXFT_PSY_A_CORE
[employer]  ID [id number]    Case was discussed with treatment team and chart was reviewed.  Patient was compliant with all meds. As per nursing report, he is still pacing, but not as often. He was observed to be attending select groups today. Patient reports good mood and states that his sleep is much improved. His appetite remains fine. Denies any manic/hypomanic symptoms, paranoid thoughts/delusions, perceptual disturbances, SI/HI.

## 2018-03-28 NOTE — PROGRESS NOTE BEHAVIORAL HEALTH - NSBHADMITCOORDCAREOTHER_PSY_A_CORE FT
creative arts and psychology staff

## 2018-03-28 NOTE — PROGRESS NOTE BEHAVIORAL HEALTH - NSBHADMITCOORDWITH_PSY_A_CORE
medical staff/social work/other
social work/other/medical staff
medical staff/social work/other
medical staff/social work/other
other/medical staff/social work
medical staff/other/social work

## 2018-03-28 NOTE — PROGRESS NOTE BEHAVIORAL HEALTH - SUMMARY
51 yo Nigerien male with PPH of schizoaffective disorder, bipolar type, and multiple recent admissions at [facility] for paranoia, now presents with increased anxiety and agitation triggered by recent discontinuation of Klonopin after prior discharge on 3/8/18. He is improving and no longer exhibiting severe akathisia. Discharge planning in progress. He continues to warrant inpatient hospitalization for medication management, symptom stabilization, and safety.

## 2018-03-28 NOTE — PROGRESS NOTE BEHAVIORAL HEALTH - NSBHADMITMEDEDUDETAILS_A_CORE FT
sedation with Klonopin

## 2018-03-28 NOTE — PROGRESS NOTE BEHAVIORAL HEALTH - RISK ASSESSMENT
Pt with significant amount of side effects of antipsychotic tx - severe akathisia and inability to function in the community, hence necessity of inpatient hospitalization for stabilization and medication management.

## 2018-03-28 NOTE — PROGRESS NOTE BEHAVIORAL HEALTH - ADDITIONAL DETAILS / COMMENTS
akathisia is improving
akathisia is improving
akathisia much less
akathisia is improving
severe akathisia but otherwise improving
akathisia has significantly improved

## 2018-03-29 VITALS
SYSTOLIC BLOOD PRESSURE: 145 MMHG | TEMPERATURE: 98 F | RESPIRATION RATE: 18 BRPM | HEART RATE: 76 BPM | DIASTOLIC BLOOD PRESSURE: 85 MMHG

## 2018-03-29 RX ADMIN — LITHIUM CARBONATE 600 MILLIGRAM(S): 300 TABLET, EXTENDED RELEASE ORAL at 09:53

## 2018-03-29 RX ADMIN — Medication 1 MILLIGRAM(S): at 09:52

## 2018-03-29 RX ADMIN — Medication 10 MILLIGRAM(S): at 09:53

## 2018-03-29 RX ADMIN — AMLODIPINE BESYLATE 10 MILLIGRAM(S): 2.5 TABLET ORAL at 09:52

## 2018-04-03 DIAGNOSIS — I10 ESSENTIAL (PRIMARY) HYPERTENSION: ICD-10-CM

## 2018-04-03 DIAGNOSIS — G25.71 DRUG INDUCED AKATHISIA: ICD-10-CM

## 2018-04-03 DIAGNOSIS — F41.9 ANXIETY DISORDER, UNSPECIFIED: ICD-10-CM

## 2018-04-03 DIAGNOSIS — F25.0 SCHIZOAFFECTIVE DISORDER, BIPOLAR TYPE: ICD-10-CM

## 2018-04-03 DIAGNOSIS — Z91.19 PATIENT'S NONCOMPLIANCE WITH OTHER MEDICAL TREATMENT AND REGIMEN: ICD-10-CM

## 2018-04-13 NOTE — PROGRESS NOTE BEHAVIORAL HEALTH - NSBHADMITIPOBS_PSY_A_CORE
132.9
Routine observation

## 2018-05-23 PROCEDURE — 80178 ASSAY OF LITHIUM: CPT

## 2018-05-23 PROCEDURE — 81003 URINALYSIS AUTO W/O SCOPE: CPT

## 2018-05-23 PROCEDURE — 93005 ELECTROCARDIOGRAM TRACING: CPT

## 2018-05-23 PROCEDURE — 80048 BASIC METABOLIC PNL TOTAL CA: CPT

## 2018-05-23 PROCEDURE — 84100 ASSAY OF PHOSPHORUS: CPT

## 2018-05-23 PROCEDURE — 85027 COMPLETE CBC AUTOMATED: CPT

## 2018-05-23 PROCEDURE — 80307 DRUG TEST PRSMV CHEM ANLYZR: CPT

## 2018-05-23 PROCEDURE — 83615 LACTATE (LD) (LDH) ENZYME: CPT

## 2018-05-23 PROCEDURE — 87486 CHLMYD PNEUM DNA AMP PROBE: CPT

## 2018-05-23 PROCEDURE — 87798 DETECT AGENT NOS DNA AMP: CPT

## 2018-05-23 PROCEDURE — 80076 HEPATIC FUNCTION PANEL: CPT

## 2018-05-23 PROCEDURE — 96374 THER/PROPH/DIAG INJ IV PUSH: CPT

## 2018-05-23 PROCEDURE — 82247 BILIRUBIN TOTAL: CPT

## 2018-05-23 PROCEDURE — 87633 RESP VIRUS 12-25 TARGETS: CPT

## 2018-05-23 PROCEDURE — 87086 URINE CULTURE/COLONY COUNT: CPT

## 2018-05-23 PROCEDURE — 85025 COMPLETE CBC W/AUTO DIFF WBC: CPT

## 2018-05-23 PROCEDURE — 85730 THROMBOPLASTIN TIME PARTIAL: CPT

## 2018-05-23 PROCEDURE — 84443 ASSAY THYROID STIM HORMONE: CPT

## 2018-05-23 PROCEDURE — 80053 COMPREHEN METABOLIC PANEL: CPT

## 2018-05-23 PROCEDURE — 82553 CREATINE MB FRACTION: CPT

## 2018-05-23 PROCEDURE — 36415 COLL VENOUS BLD VENIPUNCTURE: CPT

## 2018-05-23 PROCEDURE — 96375 TX/PRO/DX INJ NEW DRUG ADDON: CPT

## 2018-05-23 PROCEDURE — 83935 ASSAY OF URINE OSMOLALITY: CPT

## 2018-05-23 PROCEDURE — 87581 M.PNEUMON DNA AMP PROBE: CPT

## 2018-05-23 PROCEDURE — 85610 PROTHROMBIN TIME: CPT

## 2018-05-23 PROCEDURE — G0378: CPT

## 2018-05-23 PROCEDURE — 83550 IRON BINDING TEST: CPT

## 2018-05-23 PROCEDURE — 82248 BILIRUBIN DIRECT: CPT

## 2018-05-23 PROCEDURE — 82550 ASSAY OF CK (CPK): CPT

## 2018-05-23 PROCEDURE — 87040 BLOOD CULTURE FOR BACTERIA: CPT

## 2018-05-23 PROCEDURE — 83735 ASSAY OF MAGNESIUM: CPT

## 2018-05-23 PROCEDURE — 81001 URINALYSIS AUTO W/SCOPE: CPT

## 2018-05-23 PROCEDURE — 70450 CT HEAD/BRAIN W/O DYE: CPT

## 2018-05-23 PROCEDURE — 84300 ASSAY OF URINE SODIUM: CPT

## 2018-05-23 PROCEDURE — 82570 ASSAY OF URINE CREATININE: CPT

## 2018-05-23 PROCEDURE — 99285 EMERGENCY DEPT VISIT HI MDM: CPT | Mod: 25

## 2018-05-23 PROCEDURE — 83605 ASSAY OF LACTIC ACID: CPT

## 2018-05-23 PROCEDURE — 80061 LIPID PANEL: CPT

## 2018-05-23 PROCEDURE — 82803 BLOOD GASES ANY COMBINATION: CPT

## 2018-05-23 PROCEDURE — 83036 HEMOGLOBIN GLYCOSYLATED A1C: CPT

## 2018-05-23 PROCEDURE — 71045 X-RAY EXAM CHEST 1 VIEW: CPT

## 2018-05-23 PROCEDURE — 80074 ACUTE HEPATITIS PANEL: CPT

## 2018-05-23 PROCEDURE — 84436 ASSAY OF TOTAL THYROXINE: CPT

## 2018-05-23 PROCEDURE — 84466 ASSAY OF TRANSFERRIN: CPT

## 2018-05-23 PROCEDURE — 82728 ASSAY OF FERRITIN: CPT

## 2018-05-23 PROCEDURE — 84484 ASSAY OF TROPONIN QUANT: CPT

## 2018-07-27 ENCOUNTER — EMERGENCY (EMERGENCY)
Facility: HOSPITAL | Age: 53
LOS: 1 days | Discharge: HOME | End: 2018-07-27
Attending: EMERGENCY MEDICINE | Admitting: EMERGENCY MEDICINE
Payer: MEDICAID

## 2018-07-27 VITALS
RESPIRATION RATE: 16 BRPM | TEMPERATURE: 99 F | DIASTOLIC BLOOD PRESSURE: 91 MMHG | OXYGEN SATURATION: 95 % | SYSTOLIC BLOOD PRESSURE: 133 MMHG | HEART RATE: 68 BPM

## 2018-07-27 VITALS
OXYGEN SATURATION: 98 % | SYSTOLIC BLOOD PRESSURE: 125 MMHG | DIASTOLIC BLOOD PRESSURE: 85 MMHG | TEMPERATURE: 98 F | HEART RATE: 70 BPM | RESPIRATION RATE: 16 BRPM

## 2018-07-27 DIAGNOSIS — F32.9 MAJOR DEPRESSIVE DISORDER, SINGLE EPISODE, UNSPECIFIED: ICD-10-CM

## 2018-07-27 DIAGNOSIS — Z98.890 OTHER SPECIFIED POSTPROCEDURAL STATES: Chronic | ICD-10-CM

## 2018-07-27 DIAGNOSIS — F25.0 SCHIZOAFFECTIVE DISORDER, BIPOLAR TYPE: ICD-10-CM

## 2018-07-27 DIAGNOSIS — R69 ILLNESS, UNSPECIFIED: ICD-10-CM

## 2018-07-27 DIAGNOSIS — Z79.899 OTHER LONG TERM (CURRENT) DRUG THERAPY: ICD-10-CM

## 2018-07-27 LAB
ANION GAP SERPL CALC-SCNC: 11 MMOL/L — SIGNIFICANT CHANGE UP (ref 5–17)
BASOPHILS NFR BLD AUTO: 0.4 % — SIGNIFICANT CHANGE UP (ref 0–2)
BUN SERPL-MCNC: 13 MG/DL — SIGNIFICANT CHANGE UP (ref 7–23)
CALCIUM SERPL-MCNC: 9.8 MG/DL — SIGNIFICANT CHANGE UP (ref 8.4–10.5)
CHLORIDE SERPL-SCNC: 102 MMOL/L — SIGNIFICANT CHANGE UP (ref 96–108)
CO2 SERPL-SCNC: 24 MMOL/L — SIGNIFICANT CHANGE UP (ref 22–31)
CREAT SERPL-MCNC: 0.86 MG/DL — SIGNIFICANT CHANGE UP (ref 0.5–1.3)
EOSINOPHIL NFR BLD AUTO: 1.7 % — SIGNIFICANT CHANGE UP (ref 0–6)
ETHANOL SERPL-MCNC: <10 MG/DL — SIGNIFICANT CHANGE UP (ref 0–10)
GLUCOSE SERPL-MCNC: 115 MG/DL — HIGH (ref 70–99)
HCT VFR BLD CALC: 41.9 % — SIGNIFICANT CHANGE UP (ref 39–50)
HGB BLD-MCNC: 13.5 G/DL — SIGNIFICANT CHANGE UP (ref 13–17)
LYMPHOCYTES # BLD AUTO: 14.9 % — SIGNIFICANT CHANGE UP (ref 13–44)
MCHC RBC-ENTMCNC: 28.7 PG — SIGNIFICANT CHANGE UP (ref 27–34)
MCHC RBC-ENTMCNC: 32.2 G/DL — SIGNIFICANT CHANGE UP (ref 32–36)
MCV RBC AUTO: 89.1 FL — SIGNIFICANT CHANGE UP (ref 80–100)
MONOCYTES NFR BLD AUTO: 8.4 % — SIGNIFICANT CHANGE UP (ref 2–14)
NEUTROPHILS NFR BLD AUTO: 74.6 % — SIGNIFICANT CHANGE UP (ref 43–77)
PCP SPEC-MCNC: SIGNIFICANT CHANGE UP
PLATELET # BLD AUTO: 217 K/UL — SIGNIFICANT CHANGE UP (ref 150–400)
POTASSIUM SERPL-MCNC: 4.3 MMOL/L — SIGNIFICANT CHANGE UP (ref 3.5–5.3)
POTASSIUM SERPL-SCNC: 4.3 MMOL/L — SIGNIFICANT CHANGE UP (ref 3.5–5.3)
RBC # BLD: 4.7 M/UL — SIGNIFICANT CHANGE UP (ref 4.2–5.8)
RBC # FLD: 13.9 % — SIGNIFICANT CHANGE UP (ref 10.3–16.9)
SODIUM SERPL-SCNC: 137 MMOL/L — SIGNIFICANT CHANGE UP (ref 135–145)
WBC # BLD: 7.5 K/UL — SIGNIFICANT CHANGE UP (ref 3.8–10.5)
WBC # FLD AUTO: 7.5 K/UL — SIGNIFICANT CHANGE UP (ref 3.8–10.5)

## 2018-07-27 PROCEDURE — 80048 BASIC METABOLIC PNL TOTAL CA: CPT

## 2018-07-27 PROCEDURE — 85025 COMPLETE CBC W/AUTO DIFF WBC: CPT

## 2018-07-27 PROCEDURE — 84443 ASSAY THYROID STIM HORMONE: CPT

## 2018-07-27 PROCEDURE — 36415 COLL VENOUS BLD VENIPUNCTURE: CPT

## 2018-07-27 PROCEDURE — 99284 EMERGENCY DEPT VISIT MOD MDM: CPT | Mod: 25

## 2018-07-27 PROCEDURE — 80307 DRUG TEST PRSMV CHEM ANLYZR: CPT

## 2018-07-27 PROCEDURE — 90792 PSYCH DIAG EVAL W/MED SRVCS: CPT

## 2018-07-27 PROCEDURE — 99284 EMERGENCY DEPT VISIT MOD MDM: CPT

## 2018-07-27 NOTE — ED BEHAVIORAL HEALTH ASSESSMENT NOTE - OTHER
restlessness [place] repeatedly is asking this writer if he should have come to this ED and if insomnia is a reason for admission.

## 2018-07-27 NOTE — ED BEHAVIORAL HEALTH ASSESSMENT NOTE - SUICIDE PROTECTIVE FACTORS
Positive therapeutic relationships/Supportive social network or family Identifies reasons for living/Positive therapeutic relationships/Responsibility to family and others/Future oriented/Supportive social network or family

## 2018-07-27 NOTE — ED BEHAVIORAL HEALTH ASSESSMENT NOTE - REFERRAL / APPOINTMENT DETAILS
Pt has a follow up appt with SW therapist on 7/31 at 2 pm and psychiatrist Dr. Vadim Ladd on 8/1 at 10:30

## 2018-07-27 NOTE — ED BEHAVIORAL HEALTH ASSESSMENT NOTE - CURRENT MEDICATION
see d/c meds on EMR Lithium 600 mg bid  Cogentin 1 mg bid  Prolixin   Klonopin 1 mg qhs  Trazodone 150 mg qhs

## 2018-07-27 NOTE — ED ADULT NURSE NOTE - CHPI ED SYMPTOMS NEG
no disorientation/no weight loss/no suicidal/no homicidal/no paranoia/no change in level of consciousness/no hallucinations/no anxiety/no agitation

## 2018-07-27 NOTE — ED BEHAVIORAL HEALTH ASSESSMENT NOTE - OTHER PAST PSYCHIATRIC HISTORY (INCLUDE DETAILS REGARDING ONSET, COURSE OF ILLNESS, INPATIENT/OUTPATIENT TREATMENT)
See HPI.  Currently seeing Dr. Elkins.  Discharged from [facility] on Prolixin, lithium, Klonopin, trazodone, Effexor, cogentin. See HPI.  Currently seeing Dr. Ladd monthly and SW weekly.  Discharged from [facility] on Prolixin, lithium, Klonopin, trazodone, Effexor, cogentin.

## 2018-07-27 NOTE — ED ADULT NURSE NOTE - OBJECTIVE STATEMENT
53 y/o male c/o worsening depression and insomnia for the last 5 days. denies si/hi, v/t/a hallucinations, drug or alcohol use.

## 2018-07-27 NOTE — ED BEHAVIORAL HEALTH ASSESSMENT NOTE - DESCRIPTION
From [place].  Lives with mother, son, and son's wife. HTN, on meds last year but not since Novemeber pt was calm and cooperative HTN, on meds last year but not recently From [place].  Lives with mother, son, wife

## 2018-07-27 NOTE — ED BEHAVIORAL HEALTH ASSESSMENT NOTE - HPI (INCLUDE ILLNESS QUALITY, SEVERITY, DURATION, TIMING, CONTEXT, MODIFYING FACTORS, ASSOCIATED SIGNS AND SYMPTOMS)
Pt interviewed in Guyanese by this writer.   Pt is a 52M Guyanese speaking  male domiciled with wife and 22 yo son, with h/o schizoaffective disorder, bipolar type, multiple psych admissions most recently at [facility] d/yousuf 3/5/18, for paranoia, PMH HTN not on meds, BIB self with mother for insomnia and anxiety. Pt reports difficulty sleeping for the past month. He also reports increased anxiety secondary to difficulty sleeping. Pt has been able to get 5-6 hrs of sleep however. He denies other manic or psychotic symptoms. He denies depressive symptoms or SI/HI. Pt is followed by psychiatrist and SW at [facility]. He however did not call his psychiatrist prior to coming to the ED. Pt is repeatedly asking "Dr. should I come to the ED with these kind of symptoms?". Pt was advised to contact his psychiatrist in the future if he experiencing insomnia prior to coming to the ED. Pt appears to have limited insight how to utilize his outpatient services. Psychoeducation provided.   Upon review of pt's dietary habits, it became apparent pt consumes significant amount of caffeine and sugar at night, likely contributing to his insomnia. We reviewed sleep hygeine with pt and his mother present during the interview.   Pt was advised to try Melatonin to address insomnia. This writer called pt's psychiatrist who was unable to speak at the time. Pt has a follow up appt with MANUELITO on 7/31 at 2 pm and psychiatrist Dr. Vadim Ladd on 8/1 at 10:30 am [phone]

## 2018-07-27 NOTE — ED PROVIDER NOTE - OBJECTIVE STATEMENT
hx of schizoaffective disorder w complaints of depression, insomnia for the past week. no si/hi hx of schizoaffective disorder w complaints of depression, insomnia for the past week. no si/hi. , here with mother, has had recent admissions at [facility]. states compliance with medications. Pt reports difficulty sleeping for the past month. Denies manic or psychotic symptoms, SI/HI. no other somatic complaints, no cp, sob, abd pain, headache, drug use, etoh use, f/c. via [facility] .

## 2018-07-27 NOTE — ED ADULT NURSE NOTE - NS ED NURSE ELOPE COMMENTS
pt cleared by psych and MD Portillo for dc. pt left ED prior to receiving his dc paperwork and instructions. no hep lock in place.

## 2018-07-27 NOTE — ED ADULT TRIAGE NOTE - CHIEF COMPLAINT QUOTE
Pt BIBA from home c/o depression for the past 5 days. Denies HI or SI. Pt compliant with psychiatric medication.

## 2018-07-27 NOTE — ED PROVIDER NOTE - MEDICAL DECISION MAKING DETAILS
here for depression, insomnia, anxiety, given psych hx and prior admissions, psych consulted. dc with outpt fu.

## 2018-07-27 NOTE — ED PROVIDER NOTE - PHYSICAL EXAMINATION
CONSTITUTIONAL: Well appearing, well nourished, awake, alert and in no apparent distress.  HEENT: Head is atraumatic. Eyes clear bilaterally, normal EOMI. Airway patent.  CARDIAC: Normal rate, regular rhythm.  Heart sounds S1, S2.   RESPIRATORY: Breath sounds clear and equal bilaterally. no tachypnea, respiratory distress.   GASTROINTESTINAL: Abdomen soft, non-tender, no guarding, distension.  MUSCULOSKELETAL: Spine appears normal, no midline spinal tenderness, range of motion is not limited, no muscle or joint tenderness. no bony tenderness. no JVD, peripheral edema.   NEUROLOGICAL: Alert and oriented, no focal deficits, no motor or sensory deficits.  SKIN: Skin normal color for race, warm, dry and intact. No evidence of rash.  PSYCHIATRIC: Alert and oriented to person, place, time/situation. flat affect. no apparent risk to self or others.

## 2018-07-27 NOTE — ED BEHAVIORAL HEALTH ASSESSMENT NOTE - SUMMARY
Pt is a 52M Kittitian speaking  male, domiciled with wife and 22 yo son, with h/o schizoaffective disorder, bipolar type, multiple psych admissions most recently at [facility] d/yousuf 3/5/18 for paranoia, PMH HTN not on meds, BIB self with mother for insomnia and anxiety. Currently pt denies SI/Hi other manic or psychotic symptoms, reporting 5-6 hr of sleep, likely worsened by consumption of caffeine at night. Sleep hygiene was reviewed with pt. He was advised to discuss medication trial for insomnia with outpatient psychiatrist who he will see next week on 8/1 at 10:30 am. Currently there is no indication for inpatient hospitalization as pt does not present danger to self or others. Pt and mother agree with plan to follow up with outpatient providers. This writer left a message for outpatient psychiatrist.

## 2019-10-14 NOTE — BEHAVIORAL HEALTH ASSESSMENT NOTE - NSBHADMITIPDSM5_PSY_A_CORE FT
Full Thickness Lip Wedge Repair (Flap) Text: Given the location of the defect and the proximity to free margins a full thickness wedge repair was deemed most appropriate.  Using a sterile surgical marker, the appropriate repair was drawn incorporating the defect and placing the expected incisions perpendicular to the vermilion border.  The vermilion border was also meticulously outlined to ensure appropriate reapproximation during the repair.  The area thus outlined was incised through and through with a #15 scalpel blade.  The muscularis and dermis were reaproximated with deep sutures following hemostasis. Care was taken to realign the vermilion border before proceeding with the superficial closure.  Once the vermilion was realigned the superfical and mucosal closure was finished. NA

## 2020-04-07 NOTE — PROGRESS NOTE BEHAVIORAL HEALTH - EYE CONTACT
[facility] VIDEO VISIT PROGRESS NOTE      Alexa Rucker 62 year old female 1958    Chief Complaint   Patient presents with   • Pain     RIGHT ANKLE, FOOT          SUBJECTIVE  The patient is a 62 year old female who is requesting a Video Visit (V-Visit) for Sjogren's syndrome with cutaneous nodular amyloid who presents today for follow up. Her Sjogren's remains stable on Plaquenil.     She complains of pain in the ankles that shoot across the foot and travels up to the knee. She also notes left knee pain. The severity of her pain does not change with rest or ambulation.  It is sharp in quality, a 4 out of 10 in severity. She takes duloxetine and gabapentin with minimal benefit.     The patient has noticed pain in her finger with typing or picking up small objects. There is associated shaking of the fingers. No other associated symptoms. Offers no other complaints.      REVIEW OF SYSTEMS:  + joint pain. + tremors. No fevers, no oral ulcers, no alopecia, no Raynaud phenomenon, no eye inflammation, no visual changes, no rashes, no chest pain, no pleurisy, no shortness of breath, no dyspnea on exertion, no hematuria, no melena, no bright red blood per rectum, no nausea, no vomiting, no dysphagia, no bleeding, no seizures.  All other systems reviewed and negative.      MEDICATIONS  The medication list in the medical record as well as updates to the medication list submitted by the patient with this V-Visit were reviewed and updated today.        HISTORIES  I have personally reviewed and updated the following electronic medical record sections:     Past Medical History:   Diagnosis Date   • Abdominal pain 12/28/2015   • Anemia    • Blood clot associated with vein wall inflammation     leg traveled to lung   • COPD (chronic obstructive pulmonary disease) (CMS/[facility])    • Disorder of bone and cartilage, unspecified 10/15/2002   • Dysfunction of eustachian tube 5/22/2014   • Esophageal reflux    • Fibromyalgia    • GERD  (gastroesophageal reflux disease)     with esophagitis   • H/O nonmelanoma skin cancer    • Hemorrhoids    • Hiatal hernia    • IBS (irritable bowel syndrome)    • Lumbar facet joint pain 2016   • OA (osteoarthritis) of knee 2015   • Other chronic otitis externa 2014   • Other dyspnea and respiratory abnormality 2014   • Pneumonia    • PONV (postoperative nausea and vomiting)    • Primary localized cutaneous amyloidosis (CMS/HCC) 2014   • RAD (reactive airway disease)    • Sensorineural hearing loss, bilateral 2014   • Sialoadenitis 2014   • Sjogren's syndrome (CMS/HCC)    • Skin cancer    • Small fiber neuropathy 2018   • Unspecified intestinal malabsorption 2014   • Unspecified tinnitus 2014   • Urinary tract infection         Past Surgical History:   Procedure Laterality Date   • Breast biopsy  2014    amyloid tumor   • Breast surgery      2014   • Colonoscopy diagnostic     • Dexa bone density axial skeleton  10/15/2002   • Esophagogastroduodenoscopy  2018   • Joint replacement Left 12/08/15    total knee   • Knee surgery     • Laparoscopic nissen fundoplication  10/21/2019    Dr. Murillo   • Lymph node dissection     • Radiofrequency ablation Bilateral     lumbar   • Removal gallbladder     • Service to gastroenterology  10/24/14    colonoscopy/EGD       ALLERGIES:   Allergen Reactions   • Dapsone HIVES        Social History     Tobacco Use   • Smoking status: Former Smoker     Packs/day: 1.00     Years: 30.00     Pack years: 30.00     Types: Cigarettes     Last attempt to quit: 3/3/1994     Years since quittin.1   • Smokeless tobacco: Never Used   Substance Use Topics   • Alcohol use: Yes     Frequency: Monthly or less     Drinks per session: 1 or 2     Binge frequency: Never     Comment: very seldom.  a few drinks per year.  No h/o heavy alcohol use.    • Drug use: No       Family History   Problem Relation Age of Onset   • Diabetes Father     • Thyroid Mother    • Cancer Sister         non hodgkins lymphoma   • Cancer, Breast Sister    • Cancer Paternal Aunt              PHYSICAL EXAMINATION:  female is nontoxic with fluent speech. Well dressed and well developed. No visible rashes.  Alert and oriented x3. Normal appearing sclerae and conjunctivae.  Lips with no ulcerations. Normal respiratory effort. All extremities have normal range of motion.       ASSESSMENT AND PLAN:  This is a 62 year old female with:    1) Sjogren's syndrome with nodular localized cutaneous amyloidosis-stable    -   Continue Plaquenil 200 mg, take 1 tablet 2 times daily   -   The patient will obtain an annual eye exam with OCT (optical coherence tomography) testing   -   Obtain cbc, cmp and urinalysis every 3 months     2) Small fiber neuropathy secondary to Sjogren's with negative nerve conduction study.   -  Increase Gabapentin 800 mg, take 1 tablet 3 times daily     3) Advanced osteoarthritis bilateral knee. Status post bilateral total knee arthroplasty with continued pain.   -   Start Celebrex 200 mg, take 1 capsule daily with food prn pain   -   Patient was explained risks and side effects of Celebrex.  She understands and agrees to initiate the medication.   -   Follow up with Dr. Tucker Benites, orthopedics     4) Chronic back pain with lumbar radiculopathy.  -   Followed by the pain clinic     Patient instructed to call in two weeks with status update.   Follow up in 6 months     This visit was performed via live interactive two-way video.    Clinician Location: Home  Patient Location: Home    On 4/7/2020, Jackie MALONE scribed the services personally performed by Ismael Bergman, DO    The documentation recorded by the scribe accurately and completely reflects the service(s) I personally performed and the decisions made by me.                  Good

## 2020-12-04 NOTE — PROGRESS NOTE BEHAVIORAL HEALTH - THOUGHT PROCESS
Subjective:       History was provided by the mother. Danielle Cruz is a 13 m.o. male here for evaluation of cough. Symptoms began 3 weeks ago. Cough is described as nonproductive. Associated symptoms include: fevers of 101 on and off, more fever at night, nasal congestion, irriatbilty, decreased in eating and drinking, difficulty breathing. Patient denies: N/V/D. Current treatments have included racemic epinephrine, with little improvement. Patient denies having tobacco smoke exposure. No past medical history on file. There are no active problems to display for this patient.     Past Surgical History:   Procedure Laterality Date    CIRCUMCISION  2019     Family History   Problem Relation Age of Onset    No Known Problems Mother     No Known Problems Father     No Known Problems Sister     Cancer Maternal Grandmother 46        lung cancer    Cancer Maternal Grandfather 52        tongue cancer    No Known Problems Paternal Grandmother     Diabetes Paternal Grandfather     High Blood Pressure Paternal Grandfather     No Known Problems Sister     No Known Problems Sister     No Known Problems Sister      Social History     Socioeconomic History    Marital status: Single     Spouse name: None    Number of children: None    Years of education: None    Highest education level: None   Occupational History    None   Social Needs    Financial resource strain: None    Food insecurity     Worry: None     Inability: None    Transportation needs     Medical: None     Non-medical: None   Tobacco Use    Smoking status: Passive Smoke Exposure - Never Smoker    Smokeless tobacco: Never Used   Substance and Sexual Activity    Alcohol use: None    Drug use: None    Sexual activity: None   Lifestyle    Physical activity     Days per week: None     Minutes per session: None    Stress: None   Relationships    Social connections     Talks on phone: None     Gets together: None     Attends Taoist service: None     Active member of club or organization: None     Attends meetings of clubs or organizations: None     Relationship status: None    Intimate partner violence     Fear of current or ex partner: None     Emotionally abused: None     Physically abused: None     Forced sexual activity: None   Other Topics Concern    None   Social History Narrative    None     Current Outpatient Medications   Medication Sig Dispense Refill    Acetaminophen (TYLENOL CHILDRENS PO) Take by mouth      amoxicillin (AMOXIL) 400 MG/5ML suspension Take 6.8 mLs by mouth 2 times daily for 10 days 136 mL 0    clotrimazole (LOTRIMIN) 1 % cream Apply topically 2 times daily for 14 - 21 days, or until rash is gone. 1 Tube 1     No current facility-administered medications for this visit. No Known Allergies    Review of Systems  Constitutional: positive for fevers and irritability  Eyes: negative  Ears, nose, mouth, throat, and face: positive for nasal congestion  Respiratory: negative except for cough. difficulty breathing  Cardiovascular: negative  GI: positive for decreased oral intake    Objective:      Pulse 112   Temp 98.2 °F (36.8 °C)   Resp 26   Ht 31.5\" (80 cm)   Wt 26 lb 9.6 oz (12.1 kg)   BMI 18.85 kg/m²   General:   alert, appears stated age, cooperative and appears healthy. Skin:   normal   HEENT:   ENT exam normal, no neck nodes or sinus tenderness and throat normal without erythema or exudate, thick nasal drainage present   Lymph Nodes:   Cervical nodes normal.   Lungs:   clear to auscultation bilaterally, diminished, prolonged expiratory phase, loose sounding cough present   Heart:   regular rate and rhythm, S1, S2 normal, no murmur, click, rub or gallop   Abdomen:  soft, non-tender; bowel sounds normal; no masses,  no organomegaly          Assessment:      Diagnosis Orders   1.  Pneumonia due to infectious organism, unspecified laterality, unspecified part of lung           Plan:      Normal progression of disease discussed. All questions answered. Vaporizer  Extra fluids  antibiotics as orderd, follow up in one week, or sooner for worsening symptoms    Offered chest xray - mom declined. Linear

## 2021-08-06 NOTE — ED PROVIDER NOTE - PR
Just wanted to let you know that this patient spoke very highly of you!! she really appreciated you being so patient with her and several with what you are going to do during the exam and also appreciated that you were able to break down the things that she needed done into smaller objectives.  You helped her feel safe and confident with getting her medical care, you are awesome!  148ms

## 2021-08-30 NOTE — ED ADULT NURSE NOTE - NS PRO PASSIVE SMOKE EXP
Xin from [facility] pharmacy calling back and was given Catherine's reply below.    Xin states understanding.    Corinna GREEN RN  EP Triage     Unknown

## 2021-09-13 NOTE — PATIENT PROFILE ADULT. - PURPOSEFUL PROACTIVE ROUNDING
Chary Warren is a 63 year old female presenting for a follow up on diabetes.    Denies known Latex allergy or symptoms of Latex sensitivity.    Medications reviewed and updated.    Health Maintenance Due   Topic Date Due   • Shingles Vaccine (1 of 2) Never done   • Colorectal Cancer Screen-  08/04/2019   • Diabetes Eye Exam  10/01/2019   • Breast Cancer Screening  09/10/2020   • Medicare Wellness Visit  Never done   • Osteoporosis Screening  Never done   • Diabetes Foot Exam  08/26/2021   • Influenza Vaccine (1) 09/01/2021   • Diabetes A1C  09/10/2021       Patient is due for topics listed above, she wishes to proceed with Immunization(s) Influenza, Diabetes A1C, Diabetes Eye Exam and Diabetes Foot Exam, but is not proceeding with Colorectal Cancer Screening: Colonoscopy, Mammogram and MWV (Medicare Wellness Visit) at this time.    Vaccine Information Statement(s) was given today. This has been reviewed, questions answered, and verbal consent given by Patient for injection(s) and administration of Influenza (Inactivated).    Patient tolerated without incident. See immunization grid for documentation.                                 Patient

## 2021-09-20 NOTE — PROGRESS NOTE BEHAVIORAL HEALTH - NS ED BHA MED ROS ENT MOUTH
No complaints
Repair Type: Referred to oculoplastics for closure
No complaints

## 2021-09-29 NOTE — PROGRESS NOTE BEHAVIORAL HEALTH - NSBHADMITIPREASONDETAILS_PSY_A_CORE FT
disorganized; wanders
family
disorganized ; wanders
disorganized; wanders
disorganized ; wanders

## 2021-11-12 NOTE — PATIENT PROFILE ADULT. - TOBACCO USE
Unknown if ever smoked [FreeTextEntry3] : Medical record entries made by the scribe today today, were at my direction and personally dictated to them by me, Dr. Zakia Conde on Nov 11, 2021. I have reviewed the chart and agree that the record accurately reflects my personal performance of the history, physical exam, assessment, and plan.\par

## 2021-12-19 NOTE — ED PROVIDER NOTE - PSYCHIATRIC, MLM
Patient had uncomplicated, nonsurgical vaginal delivery.  Please see delivery note for details.      During postpartum course patient's vitals were stable, vaginal bleeding appropriate, and pain well controlled.      On day of discharge patient is ambulating, tolerating oral intake, voiding spontaneously and her pain is controlled with oral medications. Discharge instructions and precautions were given.  Will return to office in 6 weeks for postpartum visit and may contact the office with any concerns or issues prior to that time.  Alert and oriented to person, place, time/situation Pt appears anxious. . no apparent risk to self or others.

## 2022-01-19 NOTE — PROGRESS NOTE BEHAVIORAL HEALTH - NSBHCONSULTMEDNEW_PSY_A_CORE
[address], [place], [place], [place]      Ander Xie, YAZAN Cowan ACNP-BC     Bhavana Delcid, PCNP-BC   ISABELLA Welch, [facility]       Chicho Landry [facility] [address]    at [address], [facility] Luigi [place]cido  22.    [phone]    FAX: [address] [place] [address], [address]    [phone]    FAX: [phone]     Patient Care Team:  Rishi Ratliff MD as PCP - General (Internal Medicine)      Problem List  Date Reviewed: 11/16/2021          Codes Class Noted    Ascites ICD-10-CM: R18.8  ICD-9-CM: 789.59  9/87/3552        Alcoholic liver disease ([facility] [address] ICD-10-CM: K70.9  ICD-9-CM: 950.8  5/14/2021        Ventral hernia without obstruction or gangrene ICD-10-CM: K43.9  ICD-9-CM: 553.20  4/28/2021        Cirrhosis ([facility] [address] ICD-10-CM: K74.60  ICD-9-CM: 571.5  4/28/2021        Alcohol abuse, in remission ICD-10-CM: F10.11  ICD-9-CM: 305.03  4/28/2021              Carrie Montgomery is being seen at [facility] for management of cirrhosis secondary to alcohol. The active problem list, all pertinent past medical history, medications, liver histology, endoscopic studies, radiologic findings, and laboratory findings related to the liver disorder were reviewed and discussed with the patient. The patient is a 60 y.o. female who was found to have chronic liver disease and cirrhosis in 3/2021 when she underwent a liver ultrasound. Serologic evaluation for markers of chronic liver disease were negative.     The patient has developed the following complications of cirrhosis: ascites    The patient has the following symptoms which could be attributed to the liver disorder: swelling of the
abdomen    The patient is not experiencing the following symptoms which are commonly seen in this liver disorder:   fatigue, pain in the right side over the liver    The patient completes all daily activities without any functional limitations. ASSESSMENT AND PLAN:  Cirrhosis  The diagnosis of cirrhosis is based upon imaging, laboratory studies, complications of cirrhosis. Cirrhosis is secondary to alcohol. The patient has near normal liver function. The CTP is 6. Child class A. The MELD score is 11. Have performed laboratory testing to monitor liver function and degree of liver injury. This included BMP, hepatic panel, CBC with platelet count, INR. AST is elevated. ALT is normal.  ALP is elevated. Liver function is normal.  Total bilirubin is   elevated. The platelet count is depressed. Alcohol liver disease  Suspect the patient has alcohol induced liver disease based upon a history of consuming significant alcohol on a daily basis for many years, pattern of AST>ALT, serology that is negative for other causes of chronic liver disease. The patient had consumed 2-83 alcoholic beverages daily. The patient has been abstinent from alcohol since 8/2021. Discussed the need to remain alcohol free long term. Ascites   Ascites has resolved with current dose of diuretics and paracentesis. Paracentesis is being performed infrequently as needed. Will continue the current dose of diuretics. The patient is only on 50 mg spironolactone    The renal function is normal and ascites should be able to be controled with diuretics. The patient was counseled regarding the need to maintain sodium restriction and the types of foods containing high amounts of sodium to be avoided. ECHO performed 10/2021 was normal.    The patient was scheduled for TIPS procedure however her hyponatremia has resolved and ascites is being controlled with minimal diuretics.     Lower extremity
edema  Edema has resolved. The renal function is normal and edema should be able to be controled with diuretics. Screening for Esophageal varices   The last EGD to assess for varices was performed in 6/2021. Will schedule for EGD to assess for varices in 6/2024    Hepatic encephalopathy   Overt HE has not developed to date. There is no reason for treatment with lactulose or xifaxan. There is no need to restrict dietary protein at this time. Thrombocytopenia   This is secondary to cirrhosis. There is no evidence of overt bleeding. No treatment is required. The platelet count is adequate for the patient to undergo procedures without the need for platelet transfusion or platelet growth factors. Screening for Hepatocellular Carcinoma  HCC screening has recently been performed and does not suggest [facility] [address] The next liver imaging study will be performed in 5/2022    Treatment of other medical problems in patients with chronic liver disease  There are no contraindications for the patient to take most medications that are necessary for treatment of other medical issues. Counseling for alcohol in patients with chronic liver disease  The patient was counseled regarding alcohol consumption and the effect of alcohol on chronic liver disease. The patient has not consumed alcohol since 8/2021    Osteoporosis  The risk of osteoporosis is increased in patients with cirrhosis. DEXA bone density to assess for osteoporosis has not been performed. This should be ordered by the patients primary care physician. Vaccinations   Vaccination for viral hepatitis A and B is not needed. The patient has serologic evidence of prior exposure or vaccination with immunity. Routine vaccinations against other bacterial and viral agents can be performed as indicated. Annual flu vaccination should be administered if indicated. The patient has received 2 doses of COVID-19 vaccine and the booster.
ALLERGIES  No Known Allergies    MEDICATIONS  Current Outpatient Medications   Medication Sig    spironolactone (ALDACTONE) 50 mg tablet Take 1 Tablet by mouth daily.  folic acid (FOLVITE) 1 mg tablet Take  by mouth daily.  famotidine (PEPCID) 20 mg tablet Take 20 mg by mouth two (2) times a day.  ergocalciferol (Vitamin D2) 1,250 mcg (50,000 unit) capsule Take 50,000 Units by mouth every seven (7) days.  potassium chloride SR (KLOR-CON 10) 10 mEq tablet Take  by mouth. No current facility-administered medications for this visit. SYSTEM REVIEW NOT RELATED TO LIVER DISEASE OR REVIEWED ABOVE:  Constitution systems: Negative for fever, chills, weight gain, weight loss. Eyes: Negative for visual changes. ENT: Negative for sore throat, painful swallowing. Respiratory: Negative for cough, hemoptysis, SOB. Cardiology: Negative for chest pain, palpitations. GI:  Negative for constipation or diarrhea. : Negative for urinary frequency, dysuria, hematuria, nocturia. Skin: Negative for rash. Hematology: Negative for easy bruising, blood clots. Musculo-skelatal: Negative for back pain, muscle pain, weakness. Neurologic: Negative for headaches, dizziness, vertigo, memory problems not related to HE. Psychology: Negative for anxiety, depression. FAMILY HISTORY:  The father  of MI. The mother  of DM. There is no family history of liver disease. SOCIAL HISTORY:  The patient is . The patient has 1 child. The patient has never used tobacco products. The patient previously consumed 5-20 alcoholic beverages per day   The patient has been abstinent from alcohol since 2021   The patient used to work as a . The patient has not worked since 2021.       PHYSICAL EXAMINATION:  Visit Vitals  /79 (BP 1 Location: Left upper arm, BP Patient Position: Sitting, BP Cuff Size: Small adult)   Pulse 91   Temp 96.9 °F (36.1 °C)   Resp 22   Ht 5'
1\" (1.549 m)   Wt 109 lb (49.4 kg)   SpO2 99%   BMI 20.60 kg/m²     General: No acute distress. Eyes: Sclera anicteric. ENT: No oral lesions. Thyroid normal.  Nodes: No adenopathy. Skin: No spider angiomata. No jaundice. No palmar erythema. Respiratory: Lungs clear to auscultation. Cardiovascular: Regular heart rate. No murmurs. No JVD. Abdomen: Soft non-tender. Liver size normal to percussion/palpation. Spleen not palpable. No obvious ascites. Umbilical hernia  Extremities: No edema. No muscle wasting. No gross arthritic changes. Neurologic: Alert and oriented. Cranial nerves grossly intact. No asterixis.     LABORATORY STUDIES:  Liver [place] [address] & Units 11/16/2021 10/28/2021   WBC 4.6 - 13.2 K/uL 6.3 5.4   ANC 1.4 - 7.0 x10E3/uL 4.5 3.3   HGB 12.0 - 16.0 g/dL 11.9 11.8    - 420 K/uL 176 142 (L)   INR 0.8 - 1.2   1.1 1.1   AST 0 - 40 IU/L 30 38   ALT 0 - 32 IU/L 18 18   Alk Phos 44 - 121 IU/L 145 (H) 143 (H)   Bili, Total 0.0 - 1.2 mg/dL 1.3 (H) 1.3 (H)   Bili, Direct 0.00 - 0.40 mg/dL 0.53 (H) 0.69 (H)   Albumin 3.8 - 4.9 g/dL 2.9 (L) 2.1 (L)   BUN 6 - 24 mg/dL 5 (L) 8   Creat 0.57 - 1.00 mg/dL 0.82 0.73   Na 134 - 144 mmol/L 141 137   K 3.5 - 5.5 mmol/L CANCELED 4.2   Cl 96 - 106 mmol/L 103 103   CO2 20 - 29 mmol/L 22 22   Glucose mg/dL CANCELED 55 (L)     Cancer Screening Latest Ref Rng & Units 5/5/2021   AFP, Serum 0.0 - 8.0 ng/mL 12.1 (H)   AFP-L3% 0.0 - 9.9 % Comment     SEROLOGIES:  Serologies Latest Ref Rng & Units 5/5/2021   Hep A Ab, Total Negative Positive (A)   Hep B Surface Ag Negative Negative   Hep B Core Ab, Total Negative Negative   Hep B Surface AB QL  Reactive   Hep C Ab 0.0 - 0.9 s/co ratio <0.1   Ferritin 15 - 150 ng/mL 128   Iron % Saturation 15 - 55 % 31   MULUGETA, IFA  Negative   ASMCA 0 - 19 Units 9   Ceruloplasmin 19.0 - 39.0 mg/dL 25.5   Alpha-1 antitrypsin level 101 - 187 mg/dL 198 (H)     LIVER HISTOLOGY:  Not available or
performed    ENDOSCOPIC PROCEDURES:  Not available or performed    RADIOLOGY:  3/2021. Ultrasound of liver. Echogenic consistent with cirrhosis. No liver mass lesions. No dilated bile ducts. Mild ascites. 11/2021. Ultrasound of liver. Echogenic consistent with cirrhosis. No liver mass lesions. No dilated bile ducts. No ascites. OTHER TESTING:  Not available or performed    FOLLOW-UP:  All of the issues listed above in the Assessment and Plan were discussed with the patient. All questions were answered. The patient expressed a clear understanding of the above. [address] [address] in 2 months for monitoring.        ELLE Guzman-BC  . Kecia Pedroza [address] [place] [address] Observation [facility], [address] [place] [address] - [address]  [phone]
no
no

## 2022-04-15 NOTE — PROGRESS NOTE ADULT - PROBLEM SELECTOR PROBLEM 2
Acute renal failure, unspecified acute renal failure type
Comment: Pt stated to not have any s/e on Spironolactone at the moment.
Render Risk Assessment In Note?: no
Detail Level: Simple

## 2023-05-01 NOTE — ED BEHAVIORAL HEALTH ASSESSMENT NOTE - SUICIDE RISK FACTORS
Systolic (76LPS), HSB:151 , Min:128 , FKY:162   Diastolic (18VGY), MRS:32, Min:65, Max:89    Currently managed on Labetalol 50mg BID Agitation/severe anxiety

## 2023-05-03 NOTE — ED PROVIDER NOTE - OBJECTIVE STATEMENT
[place] : here with increased anxiety and restlessness requesting readmission to psychiatry.  States his klonipen has been tapered down and he can't sit still.  Feels very anxious.  Thinks he needs to stay in hospital again.  Denies SI/HI/Hallucinations, alcohol/ drug use. 32 y/o male coming to the ER with c/o left sided neck pain and dizziness. A&Ox4. Ambulatory. No significant PMH. Patient endorses over the last month he has had intermittent dizziness. Patient states it normally resolves on its own, however he reports yesterday at 12pm he began to have a constant headache as well as dizziness and atraumatic left sided neck pain. Patient states he feels like he is "in a daze" and "going to pass out". PERRL. Patient moving all extremities with equal strength and sensation b/l. Patient denies chest pain, fever, chills, n/v/d, urinary symptoms, abdominal pain. Safety measures maintained. Bed in the lowest position. No acute distress noted or further complaints at this time. 32 y/o male coming to the ER with c/o left sided neck pain and dizziness. A&Ox4. Ambulatory. No significant PMH. Patient endorses over the last month he has had intermittent dizziness. Patient states it normally resolves on its own, however he reports yesterday at 12pm he began to have a constant headache as well as dizziness and atraumatic left sided neck pain. Patient has full ROM of the neck. Patient states he feels like he is "in a daze" and "going to pass out". PERRL. Patient moving all extremities with equal strength and sensation b/l. Patient denies chest pain, fever, chills, n/v/d, urinary symptoms, abdominal pain. Safety measures maintained. Bed in the lowest position. No acute distress noted or further complaints at this time.

## 2023-05-05 NOTE — PROGRESS NOTE ADULT - PROBLEM/PLAN-9
DISPLAY PLAN FREE TEXT This was a shared visit with the GISELA. I reviewed and verified the documentation and independently performed the documented:

## 2023-05-17 NOTE — ED BEHAVIORAL HEALTH ASSESSMENT NOTE - NS ED BHA PLAN LEGAL STATUS
Render Post-Care Instructions In Note?: no Duration Of Freeze Thaw-Cycle (Seconds): 0 Post-Care Instructions: I reviewed with the patient in detail post-care instructions. Patient is to wear sunprotection, and avoid picking at any of the treated lesions. Pt may apply Vaseline to crusted or scabbing areas. Show Applicator Variable?: Yes Detail Level: Detailed Consent: The patient's consent was obtained including but not limited to risks of crusting, scabbing,, recurrence, incomplete removal and infection. 9.13

## 2023-11-18 NOTE — BEHAVIORAL HEALTH ASSESSMENT NOTE - SUICIDALITY
Pt awake now; has been sleeping quietly this morning w/ resp easy; states feeling better having slept. pt states taking general diet w/o nausea, passing gas, had BM and pain meds effective; states feeling good and wants to go home today. Yes

## 2023-11-21 NOTE — PROGRESS NOTE ADULT - PROBLEM/PLAN-10
-- CST Phone (Incoming) Dahlia [phone]     [employer] member services.    11/21/2023 12:36 PM CST Phone (Incoming) [facility] [place] (Emergency Contact) [phone]        DISPLAY PLAN FREE TEXT

## 2024-11-13 NOTE — ED ADULT NURSE NOTE - PRIMARY CARE PROVIDER
[employer] rep called with report, pacer pacing 7% of the time, rate remaining 100-120 but sinus rhythm per [employer] rep   non affiliated

## 2024-12-10 NOTE — H&P ADULT - PROBLEM SELECTOR PLAN 3
Spinal Block    Date/Time: 12/10/2024 7:33 AM    Performed by: Adrian Catherine MD  Authorized by: Adrian Catherine MD    Patient Location:  OR  Indication: primary anesthetic    Pre anesthesia checklist Patient Identified (2 criteria), Timeout Performed, Resuscitation equipment available, IV Bolus, Allergies confirmed, Monitors applied, Coagulation Status, Block site marked (if applicable), Sedation given (if needed), Block Plan Confirmed, Drugs/Solutions Labeled, IV Access functioning, Necessary Block Equipment Present, Consent Verified and Aseptic technique   Patient Position:  Sitting  Prep:  Chlorhexidine gluconate (CHG)  Max Sterile Barrier Technique:  Hand washing, cap/mask, sterile gloves and sterile drape  Monitoring:  Continuous pulse ox, EKG, NIBP and blood pressure  Oxygen Supplement:  Room Air  Approach:  Midline  Local Infiltration:  1% Lidocaine  Location:  L3-4  Injection Technique:  Single-shot  Needle Type:  Jayme  Needle Gauge:  27 G  Needle length: 4.69 in.  Medications Administered  lidocaine (PF) 1 % - Subcutaneous   3 mL - 12/10/2024 7:33:00 AM  bupivacaine 0.75% in dextrose 8.25% - Intrathecal   1.5 mL - 12/10/2024 7:34:00 AM    Assessment:   Number of Attempts: 1   Procedure Assessment: patient tolerated procedure well with no complications   Sensory Level:  T6  Start Time:  12/10/2024 7:33 AM  Stop Time: 12/10/2024 7:34 AM   Chloraprep x 3 and permitted adequate drying time  Atraumatic on first attempt with 27G x 4.69 in Jayme pencil point spinal needle w clear flowing CSF   Spinal performed in complete sterile fashion   Patient experienced no pain, paresthesias or dysesthesias, and tolerated the procedure well            Patient found on the ground for an unknown period of time which likely induced the [place]habdo[place] [address].  - Started IVF at 200cc/hr.   - Gonzales placed.  - Strict I's and O's.  - Titrate fluids for a goal UOP of 200-300cc/hr.   - CK in the am, f/u.

## 2025-01-16 NOTE — PROGRESS NOTE BEHAVIORAL HEALTH - NS ED BHA MSE SPEECH ARTICULATION
Patient identification verified with 2 identifiers.    Location: [facility] - [address]. [place], [place] [address] [phone]     Referring Physician: DR. ZULEMA CHEN  Enrollment/ Re-enrollment date: 2025   INR Goal: 2.0-3.0  INR monitoring is per [facility] protocol.  Anticoagulation Medication: warfarin  Indication: Atrial Fibrillation/Atrial Flutter    Subjective   Bleeding signs/symptoms: No    Bruising: No   Major bleeding event: No  Thrombosis signs/symptoms: No  Thromboembolic event: No  Missed doses: No  Extra doses: No  Medication changes: No  Dietary changes: No  Change in health: No  Change in activity: No  Alcohol: No  Other concerns: Yes  PT STATES SHE FELL OUT OF BED LAST WEEK AND HIT HER LEFT KNEE. SHE HAS SOME SWELLING BUT NO PAIN. SHE CONTACTED HER PCP AND WAS TOLD TO ICE AND ELEVATE IT. PT DENIES HITTING HER HEAD    Upcoming Procedures:  Does the Patient Have any upcoming procedures that require interruption in anticoagulation therapy? no  Does the patient require bridging? no      Anticoagulation Summary  As of 2025      INR goal:  2.0-3.0   TTR:  77.9% (1.3 y)   INR used for dosin.80 (2025)   Weekly warfarin total:  27.5 mg               Assessment/Plan   Therapeutic     1. New dose: no change    2. Next INR: 1 month      Education provided to patient during the visit:  Patient instructed to call in interim with questions, concerns and changes.   Patient educated on compliance with dosing, follow up appointments, and prescribed plan of care.        
Normal

## 2025-02-04 NOTE — PROGRESS NOTE BEHAVIORAL HEALTH - NSBHCHARTREVIEWVS_PSY_A_CORE FT
Patient notified of results and recommendation, verbalize understanding   Vital Signs Last 24 Hrs  T(C): 36.9 (28 Feb 2018 09:00), Max: 36.9 (27 Feb 2018 16:34)  T(F): 98.5 (28 Feb 2018 09:00), Max: 98.5 (27 Feb 2018 16:34)  HR: 78 (28 Feb 2018 09:00) (78 - 80)  BP: 141/93 (28 Feb 2018 09:00) (141/93 - 159/81)  BP(mean): --  RR: 18 (28 Feb 2018 09:00) (18 - 18)  SpO2: --

## 2025-02-16 NOTE — PROGRESS NOTE BEHAVIORAL HEALTH - NS ED BHA MSE SPEECH ARTICULATION
Situation   Onset: 1 week  What is happening: left groin/abdomen pain      Background   Patient Active Problem List   Diagnosis    Diabetes mellitus with complication  (CMD)    Dyslipidemia    Hypogonadism male    Essential hypertension    Vitamin D deficiency    History of gout    Pain in joint of right shoulder    Erectile dysfunction    Annual physical exam    Rupture of right quadriceps tendon    Neoplasm of uncertain behavior of skin    Left hip pain    Pruritus ani    Restless legs    GERD (gastroesophageal reflux disease)    Neck pain    Lightheadedness    Ascending aorta dilatation (CMD)    Diastolic dysfunction    CAD in native artery    Atherosclerosis of both carotid arteries    Obesity (BMI 30-39.9)     Current Outpatient Medications   Medication Sig Dispense Refill    tirzepatide (Mounjaro) 12.5 MG/0.5ML Solution Auto-injector Inject 12.5 mg into the skin every 7 days. Indications: Type 2 Diabetes Take 12.5 mg weekly for 4 weeks, then increase to 15 mg weekly thereafter 2 mL 0    tirzepatide (Mounjaro) 15 MG/0.5ML Solution Auto-injector Inject 15 mg into the skin every 7 days. Indications: Type 2 Diabetes 2 mL 5    nitroGLYCERIN (NITROSTAT) 0.4 MG sublingual tablet Place 1 tablet under the tongue every 5 minutes as needed for Chest pain. 90 tablet 3    tirzepatide (Mounjaro) 10 MG/0.5ML Solution Pen-injector Inject 10 mg into the skin every 7 days. Indications: Type 2 Diabetes 6 mL 1    allopurinol (ZYLOPRIM) 300 MG tablet Take 1 tablet by mouth daily. 90 tablet 3    atorvastatin (LIPITOR) 80 MG tablet Take 1 tablet by mouth daily. As directed. 90 tablet 3    alfuzosin (UROXATRAL) 10 MG 24 hr tablet Take 1 tablet by mouth daily. 90 tablet 3    amLODIPine (NORVASC) 2.5 MG tablet Take 1 tablet by mouth daily. 90 tablet 3    dapagliflozin (Farxiga) 10 MG tablet Take 1 tablet by mouth daily. 90 tablet 0    OneTouch Delica Lancets 33G Misc pt testing 2-3x daily 200 each 3    blood glucose (ONE TOUCH ULTRA 
TEST) test strip USE 2 STRIP Daily Check Blood glucose twice daily 200 strip 3    omeprazole (PrilOSEC) 20 MG capsule Take 20 mg by mouth daily.      aspirin 81 MG chewable tablet Chew 81 mg by mouth every 24 hours.       No current facility-administered medications for this visit.         Assessment   Patient reports pain after some heavy lifting 1 week ago. Mild relief with aspirin. States pain has not improved. Requesting appointment.     Call notes    Recommendation   Scheduled patient for appointment 2/19/25 with [facility]. Recommended Motrin 600 mg TID for pain. Patient verbalized understanding.     
Normal

## 2025-04-22 NOTE — BEHAVIORAL HEALTH ASSESSMENT NOTE - ATTENTION / CONCENTRATION
Continuity of Care Form    Patient Name: Fanny Gonzalez   :  1952  MRN:  [id number]    Admit date:  2025  Discharge date:  2025    Code Status Order: Full Code   Advance Directives:     Admitting Physician:  Maria Milanes Marino, MD  PCP: Babatunde Storm APRN - CNP    Discharging Nurse: KEMAL Ureña RN  Discharging Hospital Unit/Room#: 8204/8204-A  Discharging Unit Phone Number: [phone]    Emergency Contact:   Extended Emergency Contact Information  Primary Emergency Contact: Tracee Gonzalez  Address: [address]  Home Phone: [phone]  Mobile Phone: [phone]  Relation: Child  Secondary Emergency Contact: María Laws  Home Phone: [phone]  Relation: Niece/Nephew   needed? No    Past Surgical History:  Past Surgical History:   Procedure Laterality Date    ARTHROPLASTY Left 2016    thumb basil joint with dequervain's release    BREAST BIOPSY Right     BREAST LUMPECTOMY Right years ago    benign    BREAST LUMPECTOMY Right 2016    COLONOSCOPY  2005    COLONOSCOPY  2015    Dr. Arie ARTHUR    CORONARY ANGIOPLASTY WITH STENT PLACEMENT  2021    ECHO COMPL W DOP COLOR FLOW  2012         EYE SURGERY      FINGER SURGERY Left 2016    thumb    HAND SURGERY  2017    HYSTERECTOMY (CERVIX STATUS UNKNOWN)      JOINT REPLACEMENT      OTHER SURGICAL HISTORY Right 2017    RIGHT THUMB TRAPEZIECTOMY WITH LIGAMENT RECONSRUCTION DEQUERVAINS RELEASE    TIM AND BSO (CERVIX REMOVED)      TOTAL HIP ARTHROPLASTY Bilateral     ,  dr saldivar, dr de la torre    TOTAL KNEE ARTHROPLASTY Bilateral     dr solorio       Immunization History:   Immunization History   Administered Date(s) Administered    COVID-19, MODERNA BLUE border, Primary or Immunocompromised, (age 12y+), IM, 100 mcg/0.5mL 2021, 2021, 2022    DTaP 2010    DTaP, INFANRIX, (age 6w-6y), IM, 0.5mL 2010    Influenza 
Normal

## 2025-04-29 ENCOUNTER — EMERGENCY (EMERGENCY)
Facility: HOSPITAL | Age: 60
LOS: 1 days | End: 2025-04-29
Attending: EMERGENCY MEDICINE | Admitting: EMERGENCY MEDICINE
Payer: COMMERCIAL

## 2025-04-29 VITALS
OXYGEN SATURATION: 97 % | RESPIRATION RATE: 20 BRPM | HEART RATE: 70 BPM | SYSTOLIC BLOOD PRESSURE: 162 MMHG | TEMPERATURE: 98 F | DIASTOLIC BLOOD PRESSURE: 95 MMHG

## 2025-04-29 VITALS
HEIGHT: 72.83 IN | SYSTOLIC BLOOD PRESSURE: 160 MMHG | TEMPERATURE: 98 F | HEART RATE: 78 BPM | RESPIRATION RATE: 18 BRPM | WEIGHT: 179.9 LBS | DIASTOLIC BLOOD PRESSURE: 99 MMHG | OXYGEN SATURATION: 95 %

## 2025-04-29 DIAGNOSIS — Z98.890 OTHER SPECIFIED POSTPROCEDURAL STATES: Chronic | ICD-10-CM

## 2025-04-29 PROCEDURE — 99283 EMERGENCY DEPT VISIT LOW MDM: CPT

## 2025-04-29 PROCEDURE — 99284 EMERGENCY DEPT VISIT MOD MDM: CPT | Mod: 25

## 2025-04-29 PROCEDURE — 93005 ELECTROCARDIOGRAM TRACING: CPT

## 2025-04-29 PROCEDURE — 93010 ELECTROCARDIOGRAM REPORT: CPT

## 2025-04-29 PROCEDURE — 90792 PSYCH DIAG EVAL W/MED SRVCS: CPT

## 2025-04-29 NOTE — ED ADULT TRIAGE NOTE - WEIGHT METHOD
Medical service made aware patient without IV at this time.  Will request night RN to utilize resources and continue to  Work to establish IV.  Family updated at request.   stated

## 2025-04-29 NOTE — ED BEHAVIORAL HEALTH ASSESSMENT NOTE - TELEPSYCHIATRY?
No

## 2025-04-29 NOTE — ED PROVIDER NOTE - PATIENT PORTAL LINK FT
You can access the FollowMyHealth Patient Portal offered by [facility] by registering at the following website: [email or web address]. By joining [facility]’s FollowMyHealth portal, you will also be able to view your health information using other applications (apps) compatible with our system.

## 2025-04-29 NOTE — ED PROVIDER NOTE - CLINICAL SUMMARY MEDICAL DECISION MAKING FREE TEXT BOX
Assessment:  - Summary : Patient has a documented psychiatric history of bipolar depression which has been stable on prescribed medication for the past 8 years. Currently, he is experiencing a high degree of stress due to family strife, which is impacting his mental well-being.  - Problems :  - Bipolar depression    - Stress due to family issues    - Differential Diagnosis :  - No other potential diagnoses were discussed.    Plan:  - Summary : Plan to alleviate the patient's current psychological stress by seeking temporary residence in the hospital, away from the stressful family environment. Arranged for the patient to see a psychiatrist for further evaluation and adjustment of the current treatment plan if necessary.  - Plan :  - Psychiatric evaluation    - Review and potential adjustment of current medication regimen    - Temporary stay in the hospital for respite from family stress

## 2025-04-29 NOTE — ED PROVIDER NOTE - NS ED MD DISPO DISCHARGE CCDA
[facility] Emergency Dept  [address] [address]  Phone: [phone]  Fax: [phone]                                    Marilou Nicolas   MRN: [id number]    Department: [facility] Emergency Dept   Date of Visit: 11/2/2020           After Visit Summary Signature Page    I have received my discharge instructions, and my questions have been answered. I have discussed any challenges I see with this plan with the nurse or doctor.    ..........................................................................................................................................  Patient/Patient Representative Signature      ..........................................................................................................................................  Patient Representative Print Name and Relationship to Patient    ..................................................               ................................................  Date                                   Time    ..........................................................................................................................................  Reviewed by Signature/Title    ...................................................              ..............................................  Date                                               Time          22EPIC Rev 08/18        Patient/Caregiver provided printed discharge information.

## 2025-04-29 NOTE — ED ADULT NURSE NOTE - CHIEF COMPLAINT QUOTE
Pt presents to ED Serbian speaking only, per pt there is too many drama in my family I just want to be elsewhere. Denies SI or HI.

## 2025-04-29 NOTE — ED ADULT TRIAGE NOTE - CHIEF COMPLAINT QUOTE
Pt presents to ED Azerbaijani speaking only, per pt there is too many drama in my family I just want to be elsewhere. Denies SI or HI.

## 2025-04-29 NOTE — ED ADULT NURSE NOTE - CAS ELECT INFOMATION PROVIDED
pt bags of belongings and medications returned . dc instructions provided by MD Larose with ./DC instructions

## 2025-04-29 NOTE — ED BEHAVIORAL HEALTH ASSESSMENT NOTE - HPI (INCLUDE ILLNESS QUALITY, SEVERITY, DURATION, TIMING, CONTEXT, MODIFYING FACTORS, ASSOCIATED SIGNS AND SYMPTOMS)
Pt is a 58yo Uzbek speaking  male domiciled with wife and 28? yo son, with h/o schizoaffective disorder, bipolar type, multiple psych admissions most recently at [facility] d/yousuf 3/5/18, for paranoia, PMH HTN not on meds, BIB self ____    psychiatrist Dr. Vadim Ladd on 8/1 at 10:30 am [phone] Pt is a 58yo Togolese speaking  male domiciled with wife, with h/o schizoaffective disorder, bipolar type, multiple psych admissions most recently at [facility] d/yousuf 3/5/18, for paranoia, PMH HTN, BIB self endorsing stress at home and initially requesting hospitalization; Psychiatry consulted for evaluation.  As per ED Staff pt did not endorse SI/HI/AH/VH while being evaluated in the ED. The pt has been calm and cooperative while in the ED.   As per Psychiatry note from most recent ED visit, pt has a hx of coming to the ED in times when he is feeling stressed and has poorly utilized coping mechanisms prior to presenting in the past; he was cleared for discharge at the time of his most recent presentation in 2018.     Pt seen individually, the pt is seated in a chair, dressed in hospital gown, calm and cooperative, eating from a tray and requests five more minutes to finish his meal before speaking with writer; request honored. Pt makes jokes throughout encounter with writer. Pt reports his mood recently as "the day before yesterday I fell down and that is why I barely slept last night." When asked about his sleep overall the past two weeks states, "thanks to clonazepam I sleep okay." He reports his energy as "related to the season," his concentration recently as "really good, out of 5 I would say 4," and appetite as "getting better and today totally fine." Pt denies SI/HI/AH/VH. Pt denies being psychiatrically hospitalized since his most recent admissions at [facility].  He reports he sees his psychiatrist every 4 weeks and will next see him on May 9th.    Writer found two phone numbers in the chart - neither answered.   Mother [phone]  Machelle [phone]

## 2025-04-29 NOTE — ED ADULT TRIAGE NOTE - BEFAST ARM SIDE DRIFT
Assumed pt care at 0700. Received bedside report .     Pt Alert and oriented x  4 .VSS. POC discussed and education provided on administered medications. All questions and concerns addressed. Fall precautions, seizure precautions,  hourly rounding and Q4 hour neuro checks in place.     Patient to HD this afternoon. Respiratory education given to patient by RT RN. Anxious about going home.MD to decide if continued need for CVC or can DC line. No acute events this shift. Continues to await  acceptance for DC. CTM        No

## 2025-04-29 NOTE — ED PROVIDER NOTE - PHYSICAL EXAMINATION
CONSTITUTIONAL: pt in NAD   SKIN: Normal color and turgor.    HEAD: NC/AT.  EYES: Conjunctiva clear. Anicteric sclera.  ENT: Airway clear. Normal voice. MMM.  RESPIRATORY:  Normal respiratory rate and effort. Lungs CTA  CARDIOVASCULAR:  RRR S1S2  GI:  Abdomen soft, nontender.    MSK: Neck supple.  No limb edema or muscular tenderness. No joint swelling or ROM limitation.  NEURO: Alert, clear mental status.  Speech clear. No focal deficits. Gait steady.

## 2025-04-29 NOTE — ED ADULT NURSE NOTE - OBJECTIVE STATEMENT
[id number]  ID. 59y male c/o "wanting to get away from his family. pt denies AH, VH, SI, HI. However, reports that his family has been telling him to hurt other people, states he does not want to. rpts hx of manic depressive episodes. states he takes lithium and other pysch meds, does not state which ones. pt speaking in tangents in ER, not answering questions directly. placed on 1:1 due to possible AH-his family telling him to hurt other. pt appears anxious and restless in ED. 1:! 1 initiated at 15:45. denies drug use, AH.

## 2025-04-29 NOTE — ED BEHAVIORAL HEALTH ASSESSMENT NOTE - OTHER
very slightly disorganized at times, consistent with previous descriptions in chart when pt is at baseline

## 2025-04-29 NOTE — ED BEHAVIORAL HEALTH ASSESSMENT NOTE - SUMMARY
********************INCOMPLETE NOTE************************************************* Pt is a 60yo Faroese speaking  male domiciled with wife, with h/o schizoaffective disorder, bipolar type, multiple psych admissions most recently at [facility] d/yousuf 3/5/18, for paranoia, PMH HTN, BIB self endorsing stress at home and initially requesting hospitalization; Psychiatry consulted for evaluation. No dangerousness evident, pt does not at this time represent an imminent threat of danger to self or others and does not meet criteria for Involuntary Psychiatric Hospitalization.

## 2025-04-29 NOTE — ED PROVIDER NOTE - ATTENDING APP SHARED VISIT CONTRIBUTION OF CARE
Case was discussed in detail with the PA.  Psychiatrist Dr. Green advised me that patient can be discharged.  I reassessed the patient at 19:50.  He is well appearing.  Through [place]i  (phone system utilized by Francisco) patient advised of plan for discharge.  He is comfortable with the plan and is able to explain his follow up plan to me in detail.  HE is advised that he can return at any time if needed. Patient verbalizes understanding of discussion and plan.  We do not have written [place]raini dc instructions available.

## 2025-04-29 NOTE — ED PROVIDER NOTE - NSFOLLOWUPINSTRUCTIONS_ED_ALL_ED_FT
Bipolar I Disorder  Bipolar I disorder is a mental health disorder in which a person has episodes of emotional highs (ave) and may also have episodes of lows (depression). Bipolar I disorder differs from other bipolar disorders in that it involves extreme episodes of ave (manic episodes). These episodes last at least one week or involve severe symptoms that require a hospital stay to keep the person safe.    What are the causes?  The cause of this condition is not known.    What increases the risk?  The following factors may make you more likely to develop this condition:  Having a family member with the disorder.  Having an imbalance of certain chemicals in the brain (neurotransmitters).  Experiencing stress, such as from illness, financial problems, or a death.  Having certain conditions that affect the brain or spinal cord (neurologic conditions).  Having had a brain injury (trauma).  What are the signs or symptoms?  Symptoms of bipolar I disorder include the following:    Symptoms of ave    Very high self-esteem or self-confidence.  Less need for sleep.  Unusual talkativeness. Speech may be very fast.  Racing thoughts with quick shifts between topics that may or may not be related (flight of ideas).  Being much more able or much less able to concentrate.  Increased purposeful activity, such as work, studies, or social activity.  Increased agitation. This could be pacing, squirming, fidgeting, or finger and toe tapping.  Impulsive behavior and poor judgment. These may lead to high-risk activities that are sexual, financial, or physical.  Symptoms of depression    Extreme sadness, uncontrollable crying, or feeling hopeless, worthless, or numb.  Sleep problems, such as trouble falling asleep or staying asleep (insomnia), waking early, or sleeping too much.  No longer enjoying things you used to enjoy.  Isolation, or spending time alone often.  Lack of energy or motivation, and moving more slowly than normal.  Trouble making decisions.  Increased appetite or loss of appetite.  Thoughts of death, or wanting to harm yourself.  Sometimes, you may have a mixed mood. This means having symptoms of ave and depression at the same time. Stress can often trigger these symptoms.    How is this diagnosed?  This condition may be diagnosed based on a mental health evaluation that includes a review of:  Emotional episodes.  Medical history.  Use of alcohol, drugs, and prescription medicines. Certain medical conditions and substances can cause secondary bipolar disorder. This has symptoms like the symptoms of bipolar disorder.  Your health care provider may ask you to take a short test to help understand your symptoms. You may also be asked to follow up with a mental health provider for further evaluation or to start treatment.    How is this treated?  A person talking with a mental health provider.  A person sitting on the floor doing yoga.  This condition is a long-term (chronic) illness. It is often managed with ongoing treatment rather than treatment only when symptoms occur. A combination of treatments is often used. Treatment may include:  Medicines, usually medicines called mood stabilizers. Medicines can be prescribed by a health care provider who specializes in treating mental health disorders (psychiatrist). If symptoms occur during use of a mood stabilizer, other medicines may be added.  Talk therapy (psychotherapy) to help you manage bipolar disorder. Talk therapy includes cognitive behavioral therapy (CBT) and family therapy.  Psychoeducation. This helps you and others understand how your disorder is managed. Include friends and family in educational sessions so they learn how best to support you.  Methods of managing your condition, such as journaling or relaxation exercises. Exercises include:  Yoga.  Meditation.  Deep breathing.  Lifestyle changes, such as:  Limiting alcohol and drug use.  Exercising regularly.  Setting a regular bedtime and wake time.  Eating a healthy diet.  Electroconvulsive therapy (ECT). This is a procedure in which electricity is applied to the brain through the scalp. ECT may be used for severe bipolar disorder when medicine and psychotherapy work too slowly or do not work.  Follow these instructions at home:  Activity    Return to your normal activities as told by your health care provider.  Find activities that you enjoy, and make time to do them.  Get regular exercise.  Lifestyle    A plate with examples of foods in a healthy diet.  Follow a set schedule for eating and sleeping.  Eat a healthy diet that includes fresh fruits and vegetables, whole grains, low-fat dairy, and lean meat.  Get at least 7–8 hours of sleep each night.  Avoid using products that contain nicotine or tobacco. If you want help quitting, ask your health care provider.  Avoid alcohol and drugs. They can affect how medicine works and make symptoms worse.  General instructions    Take over-the-counter and prescription medicines only as told by your health care provider. You may think about stopping your medicine, but you need to take all your medicine as prescribed. This helps manage your symptoms.  Consider joining a support group. Your health care provider may be able to recommend one.  Talk with your family and friends about your treatment goals and how loved ones can help.  Keep all follow-up visits. This is important.  Where to find more information  National [place] on Mental Illness: bright.org  National [place] of Mental Health: nimh.nih.gov  Contact a health care provider if:  Your symptoms get worse, or your loved ones tell you that your symptoms are getting worse.  You have uncomfortable side effects from your medicine.  You have trouble sleeping.  You have trouble doing daily activities.  You feel unsafe in your surroundings.  You are using alcohol or drugs to manage your symptoms.  Get help right away if:  You have new symptoms.  You have thoughts about harming yourself or others.  You are considering suicide.  If you ever feel like you may hurt yourself or others, or have thoughts about taking your own life, get help right away. Go to your nearest emergency department or:  Call your local emergency services (911 in the [place]  Call a suicide crisis helpline, such as the National Suicide Prevention Lifeline at 1-673.743.7901 or 136 in the [place] This is open 24 hours a day in the [place]  If you’re a [place]:  Call 988 and press 1. This is open 24 hours a day.  Text the [facility] Crisis Line at 406956.  Summary  Bipolar I disorder is a lifelong mental health disorder in which a person has episodes of ave and depression.  Treatment for this disorder involves a combination of treatments such as medicines and talk and behavioral therapies.  Include friends and family in educational sessions so they know how best to support you.  Get help right away if you are considering suicide.  This information is not intended to replace advice given to you by your health care provider. Make sure you discuss any questions you have with your health care provider.    Document Revised: 08/01/2024 Document Reviewed: 06/09/2022  Elsevier Patient Education © 2025 [employer]

## 2025-04-29 NOTE — ED BEHAVIORAL HEALTH ASSESSMENT NOTE - RISK ASSESSMENT
Static: Male sx  Protective: help-seeking  Modifiable: development of coping mechanisms   Current Risk: LOW  Current risk is mitigated by pt being in treatment with his provider
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
## 2025-04-29 NOTE — ED ADULT NURSE NOTE - BEFAST FACE
Please schedule your mammogram.  Call [phone]. Do fasting labs soon. Fast for 12 hours. Water is okay. At this time, they are asking patients to schedule blood tests.   Please call central registration at [phone] or go to [email or web address]
No

## 2025-05-01 DIAGNOSIS — Z87.891 PERSONAL HISTORY OF NICOTINE DEPENDENCE: ICD-10-CM

## 2025-05-01 DIAGNOSIS — I10 ESSENTIAL (PRIMARY) HYPERTENSION: ICD-10-CM
